# Patient Record
Sex: FEMALE | Race: WHITE | NOT HISPANIC OR LATINO | Employment: FULL TIME | URBAN - METROPOLITAN AREA
[De-identification: names, ages, dates, MRNs, and addresses within clinical notes are randomized per-mention and may not be internally consistent; named-entity substitution may affect disease eponyms.]

---

## 2017-03-27 ENCOUNTER — TRANSCRIBE ORDERS (OUTPATIENT)
Dept: MAMMOGRAPHY | Facility: HOSPITAL | Age: 47
End: 2017-03-27

## 2017-03-27 DIAGNOSIS — Z12.31 VISIT FOR SCREENING MAMMOGRAM: Primary | ICD-10-CM

## 2017-04-17 ENCOUNTER — HOSPITAL ENCOUNTER (OUTPATIENT)
Dept: MAMMOGRAPHY | Facility: HOSPITAL | Age: 47
Discharge: HOME | End: 2017-04-17
Attending: OBSTETRICS & GYNECOLOGY | Admitting: OBSTETRICS & GYNECOLOGY

## 2017-04-17 DIAGNOSIS — Z12.31 VISIT FOR SCREENING MAMMOGRAM: ICD-10-CM

## 2017-04-17 PROCEDURE — 77063 BREAST TOMOSYNTHESIS BI: CPT

## 2017-04-17 PROCEDURE — G0202 SCR MAMMO BI INCL CAD: HCPCS

## 2017-04-17 PROCEDURE — 77063 BREAST TOMOSYNTHESIS BI: CPT | Performed by: RADIOLOGY

## 2017-04-17 PROCEDURE — 77067 SCR MAMMO BI INCL CAD: CPT | Performed by: RADIOLOGY

## 2018-04-26 ENCOUNTER — TRANSCRIBE ORDERS (OUTPATIENT)
Dept: MAMMOGRAPHY | Facility: HOSPITAL | Age: 48
End: 2018-04-26

## 2018-04-26 DIAGNOSIS — Z12.31 VISIT FOR SCREENING MAMMOGRAM: Primary | ICD-10-CM

## 2018-05-22 ENCOUNTER — HOSPITAL ENCOUNTER (OUTPATIENT)
Dept: MAMMOGRAPHY | Facility: HOSPITAL | Age: 48
Discharge: HOME | End: 2018-05-22
Attending: OBSTETRICS & GYNECOLOGY | Admitting: OBSTETRICS & GYNECOLOGY

## 2018-05-22 DIAGNOSIS — Z12.31 VISIT FOR SCREENING MAMMOGRAM: ICD-10-CM

## 2018-05-22 PROCEDURE — 77067 SCR MAMMO BI INCL CAD: CPT | Performed by: RADIOLOGY

## 2018-05-22 PROCEDURE — 77063 BREAST TOMOSYNTHESIS BI: CPT

## 2018-05-22 PROCEDURE — 77067 SCR MAMMO BI INCL CAD: CPT

## 2018-05-22 PROCEDURE — 77063 BREAST TOMOSYNTHESIS BI: CPT | Performed by: RADIOLOGY

## 2019-05-10 ENCOUNTER — TRANSCRIBE ORDERS (OUTPATIENT)
Dept: MAMMOGRAPHY | Facility: HOSPITAL | Age: 49
End: 2019-05-10

## 2019-05-10 DIAGNOSIS — Z12.31 VISIT FOR SCREENING MAMMOGRAM: Primary | ICD-10-CM

## 2019-05-29 ENCOUNTER — HOSPITAL ENCOUNTER (OUTPATIENT)
Dept: MAMMOGRAPHY | Facility: HOSPITAL | Age: 49
Discharge: HOME | End: 2019-05-29
Admitting: OBSTETRICS & GYNECOLOGY

## 2019-05-29 DIAGNOSIS — Z12.31 VISIT FOR SCREENING MAMMOGRAM: ICD-10-CM

## 2019-05-29 PROCEDURE — 77063 BREAST TOMOSYNTHESIS BI: CPT | Performed by: RADIOLOGY

## 2019-05-29 PROCEDURE — 77063 BREAST TOMOSYNTHESIS BI: CPT

## 2019-05-29 PROCEDURE — 77067 SCR MAMMO BI INCL CAD: CPT | Performed by: RADIOLOGY

## 2019-05-29 PROCEDURE — 77067 SCR MAMMO BI INCL CAD: CPT

## 2020-05-27 ENCOUNTER — TRANSCRIBE ORDERS (OUTPATIENT)
Dept: MAMMOGRAPHY | Facility: HOSPITAL | Age: 50
End: 2020-05-27

## 2020-05-27 DIAGNOSIS — Z12.31 VISIT FOR SCREENING MAMMOGRAM: Primary | ICD-10-CM

## 2020-06-26 ENCOUNTER — TRANSCRIBE ORDERS (OUTPATIENT)
Dept: MAMMOGRAPHY | Facility: HOSPITAL | Age: 50
End: 2020-06-26

## 2020-06-26 DIAGNOSIS — Z12.31 VISIT FOR SCREENING MAMMOGRAM: Primary | ICD-10-CM

## 2020-07-13 ENCOUNTER — HOSPITAL ENCOUNTER (OUTPATIENT)
Dept: MAMMOGRAPHY | Facility: HOSPITAL | Age: 50
Discharge: HOME | End: 2020-07-13
Admitting: OBSTETRICS & GYNECOLOGY

## 2020-07-13 DIAGNOSIS — Z12.31 VISIT FOR SCREENING MAMMOGRAM: ICD-10-CM

## 2020-07-13 PROCEDURE — 77067 SCR MAMMO BI INCL CAD: CPT | Performed by: RADIOLOGY

## 2020-07-13 PROCEDURE — 77067 SCR MAMMO BI INCL CAD: CPT

## 2020-07-13 PROCEDURE — 77063 BREAST TOMOSYNTHESIS BI: CPT

## 2020-07-13 PROCEDURE — 77063 BREAST TOMOSYNTHESIS BI: CPT | Performed by: RADIOLOGY

## 2021-08-10 ENCOUNTER — APPOINTMENT (OUTPATIENT)
Dept: MAMMOGRAPHY | Facility: HOSPITAL | Age: 51
End: 2021-08-10

## 2021-08-17 ENCOUNTER — LAB (OUTPATIENT)
Dept: LAB | Facility: HOSPITAL | Age: 51
End: 2021-08-17

## 2021-08-17 ENCOUNTER — CONSULT (OUTPATIENT)
Dept: ONCOLOGY | Facility: CLINIC | Age: 51
End: 2021-08-17

## 2021-08-17 VITALS
BODY MASS INDEX: 39.49 KG/M2 | DIASTOLIC BLOOD PRESSURE: 70 MMHG | TEMPERATURE: 98.1 F | SYSTOLIC BLOOD PRESSURE: 139 MMHG | RESPIRATION RATE: 18 BRPM | WEIGHT: 237 LBS | HEIGHT: 65 IN | OXYGEN SATURATION: 96 % | HEART RATE: 74 BPM

## 2021-08-17 DIAGNOSIS — D72.829 LEUKOCYTOSIS, UNSPECIFIED TYPE: Primary | ICD-10-CM

## 2021-08-17 DIAGNOSIS — D72.829 LEUKOCYTOSIS, UNSPECIFIED TYPE: ICD-10-CM

## 2021-08-17 LAB
ALBUMIN SERPL-MCNC: 4.6 G/DL (ref 3.5–5.2)
ALBUMIN/GLOB SERPL: 1.3 G/DL
ALP SERPL-CCNC: 80 U/L (ref 39–117)
ALT SERPL W P-5'-P-CCNC: 14 U/L (ref 1–33)
ANION GAP SERPL CALCULATED.3IONS-SCNC: 13 MMOL/L (ref 5–15)
AST SERPL-CCNC: 18 U/L (ref 1–32)
BILIRUB SERPL-MCNC: 0.3 MG/DL (ref 0–1.2)
BUN SERPL-MCNC: 12 MG/DL (ref 6–20)
BUN/CREAT SERPL: 15.2 (ref 7–25)
CALCIUM SPEC-SCNC: 9.8 MG/DL (ref 8.6–10.5)
CHLORIDE SERPL-SCNC: 99 MMOL/L (ref 98–107)
CO2 SERPL-SCNC: 25 MMOL/L (ref 22–29)
CREAT SERPL-MCNC: 0.79 MG/DL (ref 0.57–1)
CRP SERPL-MCNC: 0.97 MG/DL (ref 0–0.5)
ERYTHROCYTE [DISTWIDTH] IN BLOOD BY AUTOMATED COUNT: 13.4 % (ref 12.3–15.4)
ERYTHROCYTE [SEDIMENTATION RATE] IN BLOOD: 33 MM/HR (ref 0–30)
GFR SERPL CREATININE-BSD FRML MDRD: 77 ML/MIN/1.73
GLOBULIN UR ELPH-MCNC: 3.5 GM/DL
GLUCOSE SERPL-MCNC: 115 MG/DL (ref 65–99)
HCT VFR BLD AUTO: 36.4 % (ref 34–46.6)
HGB BLD-MCNC: 11.9 G/DL (ref 12–15.9)
LDH SERPL-CCNC: 150 U/L (ref 135–214)
LYMPHOCYTES # BLD AUTO: 4 10*3/MM3 (ref 0.7–3.1)
LYMPHOCYTES NFR BLD AUTO: 33.5 % (ref 19.6–45.3)
MCH RBC QN AUTO: 30.1 PG (ref 26.6–33)
MCHC RBC AUTO-ENTMCNC: 32.8 G/DL (ref 31.5–35.7)
MCV RBC AUTO: 91.7 FL (ref 79–97)
MONOCYTES # BLD AUTO: 0.6 10*3/MM3 (ref 0.1–0.9)
MONOCYTES NFR BLD AUTO: 4.9 % (ref 5–12)
NEUTROPHILS NFR BLD AUTO: 61.6 % (ref 42.7–76)
NEUTROPHILS NFR BLD AUTO: 7.4 10*3/MM3 (ref 1.7–7)
PLATELET # BLD AUTO: 422 10*3/MM3 (ref 140–450)
PMV BLD AUTO: 6.6 FL (ref 6–12)
POTASSIUM SERPL-SCNC: 3.9 MMOL/L (ref 3.5–5.2)
PROT SERPL-MCNC: 8.1 G/DL (ref 6–8.5)
RBC # BLD AUTO: 3.96 10*6/MM3 (ref 3.77–5.28)
SODIUM SERPL-SCNC: 137 MMOL/L (ref 136–145)
WBC # BLD AUTO: 12 10*3/MM3 (ref 3.4–10.8)

## 2021-08-17 PROCEDURE — 99204 OFFICE O/P NEW MOD 45 MIN: CPT | Performed by: INTERNAL MEDICINE

## 2021-08-17 PROCEDURE — 85060 BLOOD SMEAR INTERPRETATION: CPT

## 2021-08-17 PROCEDURE — 83615 LACTATE (LD) (LDH) ENZYME: CPT

## 2021-08-17 PROCEDURE — 85025 COMPLETE CBC W/AUTO DIFF WBC: CPT

## 2021-08-17 PROCEDURE — 86140 C-REACTIVE PROTEIN: CPT

## 2021-08-17 PROCEDURE — 36415 COLL VENOUS BLD VENIPUNCTURE: CPT

## 2021-08-17 PROCEDURE — 80053 COMPREHEN METABOLIC PANEL: CPT

## 2021-08-17 PROCEDURE — 85652 RBC SED RATE AUTOMATED: CPT

## 2021-08-17 RX ORDER — VALACYCLOVIR HYDROCHLORIDE 1 G/1
TABLET, FILM COATED ORAL
COMMUNITY
Start: 2021-06-25 | End: 2022-09-23 | Stop reason: SDUPTHER

## 2021-08-17 RX ORDER — ATORVASTATIN CALCIUM 40 MG/1
TABLET, FILM COATED ORAL
COMMUNITY
Start: 2021-06-18 | End: 2021-08-24

## 2021-08-17 RX ORDER — METFORMIN HYDROCHLORIDE 500 MG/1
TABLET, EXTENDED RELEASE ORAL
COMMUNITY
Start: 2021-07-19 | End: 2022-04-15 | Stop reason: SDUPTHER

## 2021-08-17 RX ORDER — SPIRONOLACTONE 25 MG/1
TABLET ORAL
COMMUNITY
Start: 2021-06-17 | End: 2022-04-15 | Stop reason: SDUPTHER

## 2021-08-17 RX ORDER — EXENATIDE 2 MG/.85ML
INJECTION, SUSPENSION, EXTENDED RELEASE SUBCUTANEOUS
COMMUNITY
Start: 2021-07-20 | End: 2022-04-15 | Stop reason: SDUPTHER

## 2021-08-17 RX ORDER — LAMOTRIGINE 200 MG/1
TABLET ORAL
COMMUNITY
Start: 2021-06-18 | End: 2022-04-15 | Stop reason: SDUPTHER

## 2021-08-17 NOTE — PROGRESS NOTES
New Patient Office Visit      Date: 2021     Patient Name: Emily Heller  MRN: [id number]  : 1970  Referring Physician: Rubens Bass    Chief Complaint: Establish care for leukocytosis    History of Present Illness: Emily Heller is a pleasant 51 y.o. female the past medical history of hyperlipidemia, type 2 diabetes, hypertension, anxiety who presents today for evaluation of [place] ptosis. The patient was recently seen by their PCP who checked a CBC which was notable for an elevated WBC to 12.8 with an ANC of 7600 in 2021.  Previously it had been around 10.8 in 2021.  She is a former smoker but quit in .  Denies any recent or recurrent infections.  Denies any unexplained weight loss, fevers, chills, night sweats.  Does note a history of an uncle with a history of leukemia but no other hematologic malignancies.  She denies any recent steroid use as well.  Otherwise she feels well and has no major complaints today.    Oncology History:    Oncology/Hematology History    No history exists.       Subjective      Review of Systems:     Constitutional: Negative for fevers, chills, or weight loss  Eyes: Negative for blurred vision or discharge         Ear/Nose/Throat: Negative for difficulty swallowing, sore throat, LAD                                                       Respiratory: Negative for cough, SOA, wheezing                                                                                        Cardiovascular: Negative for chest pain or palpitations                                                                  Gastrointestinal: Negative for nausea, vomiting or diarrhea                                                                     Genitourinary: Negative for dysuria or hematuria                                                                                           Musculoskeletal: Negative for any joint pains or muscle aches                          "                                               Neurologic: Negative for any weakness, headaches, dizziness                                                                         Hematologic: Negative for any easy bleeding or bruising                                                                                   Psychiatric: Negative for anxiety or depression                             Past Medical History: History reviewed. No pertinent past medical history.    Past Surgical History:   Past Surgical History:   Procedure Laterality Date   • REDUCTION MAMMAPLASTY  2003       Family History:   Family History   Problem Relation Age of Onset   • Breast cancer Paternal Aunt         age unknown   • Ovarian cancer Neg Hx        Social History:   Social History     Socioeconomic History   • Marital status:      Spouse name: Not on file   • Number of children: Not on file   • Years of education: Not on file   • Highest education level: Not on file   Tobacco Use   • Smoking status: Unknown If Ever Smoked       Medications:     Current Outpatient Medications:   •  atorvastatin (LIPITOR) 40 MG tablet, , Disp: , Rfl:   •  Bydureon BCise 2 MG/0.85ML auto-injector injection, , Disp: , Rfl:   •  lamoTRIgine (LaMICtal) 200 MG tablet, , Disp: , Rfl:   •  metFORMIN ER (GLUCOPHAGE-XR) 500 MG 24 hr tablet, , Disp: , Rfl:   •  spironolactone (ALDACTONE) 25 MG tablet, , Disp: , Rfl:   •  valACYclovir (VALTREX) 1000 MG tablet, , Disp: , Rfl:     Allergies:   No Known Allergies    Objective     Physical Exam:  Vital Signs:   Vitals:    08/17/21 1403   BP: 139/70   Pulse: 74   Resp: 18   Temp: 98.1 °F (36.7 °C)   SpO2: 96%   Weight: 108 kg (237 lb)   Height: 165.1 cm (65\")   PainSc: 0-No pain     Pain Score    08/17/21 1403   PainSc: 0-No pain     ECOG Performance Status: 0 - Asymptomatic    Constitutional: NAD, ECOG 0  Eyes: PERRLA, scleral anicteric  ENT: No LAD, no thyromegaly  Respiratory: CTAB, no wheezing, rales, " rhonchi  Cardiovascular: RRR, no murmurs, pulses 2+ bilaterally  Abdomen: soft, NT/ND, no HSM  Musculoskeletal: strength 5/5 bilaterally, no c/c/e  Neurologic: A&O x 3, CN II-XII intact grossly  Psych: mood and affect congruent, no SI or HI    Results Review:   No visits with results within 2 Week(s) from this visit.   Latest known visit with results is:   No results found for any previous visit.       No results found.    Assessment / Plan      Assessment/Plan:   1. Leukocytosis, unspecified type (Primary)  -Unclear etiology at this time.  Likely secondary to increased inflammation from metabolic syndrome  -Per review of outside records, most recent WBC 12.8 with an ANC of 7.6 in June 2021.  This is slightly elevated from comparison in April 2021  -Outside records notable for normal vitamin D, vitamin B12, ferritin, folic acid, iron level, TSH in April 2021  -We will check labs as below to rule out other secondary causes  -No indication for bone marrow biopsy at this time  -     Lactate Dehydrogenase; Future  -     Peripheral Blood Smear; Future  -     Flow Cytometry, Blood; Future  -     CBC & Differential; Future  -     Comprehensive Metabolic Panel; Future  -     C-reactive Protein; Future  -     Sedimentation Rate; Future      Follow Up:   Follow-up in 2-3 weeks     Reji Herndon MD  Hematology and Oncology     Please note that portions of this note may have been completed with a voice recognition program. Efforts were made to edit the dictations, but occasionally words are mistranscribed.

## 2021-08-18 LAB
CYTOLOGIST CVX/VAG CYTO: NORMAL
PATH INTERP BLD-IMP: NORMAL

## 2021-08-19 LAB — REF LAB TEST METHOD: NORMAL

## 2021-08-24 ENCOUNTER — OFFICE VISIT (OUTPATIENT)
Dept: OBSTETRICS AND GYNECOLOGY | Facility: CLINIC | Age: 51
End: 2021-08-24

## 2021-08-24 ENCOUNTER — HOSPITAL ENCOUNTER (OUTPATIENT)
Dept: MAMMOGRAPHY | Facility: HOSPITAL | Age: 51
Discharge: HOME | End: 2021-08-24

## 2021-08-24 ENCOUNTER — TELEPHONE (OUTPATIENT)
Dept: OBSTETRICS AND GYNECOLOGY | Facility: CLINIC | Age: 51
End: 2021-08-24

## 2021-08-24 VITALS
BODY MASS INDEX: 39.82 KG/M2 | HEIGHT: 65 IN | SYSTOLIC BLOOD PRESSURE: 124 MMHG | DIASTOLIC BLOOD PRESSURE: 70 MMHG | WEIGHT: 239 LBS

## 2021-08-24 DIAGNOSIS — Z12.31 BREAST CANCER SCREENING BY MAMMOGRAM: ICD-10-CM

## 2021-08-24 DIAGNOSIS — N84.1 CERVICAL POLYP: ICD-10-CM

## 2021-08-24 DIAGNOSIS — Z12.11 SCREEN FOR COLON CANCER: ICD-10-CM

## 2021-08-24 DIAGNOSIS — Z12.31 VISIT FOR SCREENING MAMMOGRAM: ICD-10-CM

## 2021-08-24 DIAGNOSIS — N92.6 IRREGULAR PERIODS: ICD-10-CM

## 2021-08-24 DIAGNOSIS — Z01.419 WOMEN'S ANNUAL ROUTINE GYNECOLOGICAL EXAMINATION: Primary | ICD-10-CM

## 2021-08-24 DIAGNOSIS — N92.1 MENORRHAGIA WITH IRREGULAR CYCLE: ICD-10-CM

## 2021-08-24 PROCEDURE — 99396 PREV VISIT EST AGE 40-64: CPT | Performed by: NURSE PRACTITIONER

## 2021-08-24 NOTE — PROGRESS NOTES
GYN Annual Exam     CC - Here for annual exam.        HPI  Emily Heller is a 51 y.o. female, , who presents for annual well woman exam.  She reports her periods are every 3 wks-6 months and heavy x 3 days.  There were no changes to her medical or surgical history since her last visit.. Partner Status: Marital Status: .  New Partners since last visit: no.      She had a LEEP in her 20s and she is worried about her Pap.    Additional OB/GYN History   Current contraception: contraceptive methods: Post menopausal status  Desires to: do not start contraception  On HRT? No  Last Pap : - negative  Last Completed Pap Smear          Ordered - PAP SMEAR (Every 3 Years) Ordered on 2020  Done - negative              History of abnormal Pap smear: yes  Family history of uterine, colon, breast, or ovarian cancer: yes - paternal aunt with breast cancer  Performs monthly Self-Breast Exam: no  Last mammogram:   Last Completed Mammogram          Ordered - MAMMOGRAM (Every 2 Years) Ordered on 2020  Mammo Screening Digital Tomosynthesis Bilateral With CAD    2019  Mammo Screening Digital Tomosynthesis Bilateral With CAD    2018  Mammo screening digital tomosynthesis bilateral w CAD    2017  Mammo Screening Digital Tomosynthesis Bilateral With CAD    02/15/2016  Mammo screening bilateral w CAD    Only the first 5 history entries have been loaded, but more history exists.              Last colonoscopy:  never    Last DEXA: None  Exercises Regularly: no  Feelings of Anxiety or Depression: no      Tobacco Usage?: No   OB History        3    Para   3    Term   3            AB        Living           SAB        TAB        Ectopic        Molar        Multiple        Live Births                    Health Maintenance   Topic Date Due   • Annual Gynecologic Pelvic and Breast Exam  Never done   • URINE MICROALBUMIN  Never done   • COLORECTAL  "CANCER SCREENING  Never done   • ANNUAL PHYSICAL  Never done   • Pneumococcal Vaccine 0-64 (1 of 2 - PPSV23) Never done   • COVID-19 Vaccine (1) Never done   • ZOSTER VACCINE (1 of 2) Never done   • HEPATITIS C SCREENING  Never done   • DIABETIC FOOT EXAM  Never done   • DIABETIC EYE EXAM  Never done   • INFLUENZA VACCINE  10/01/2021   • HEMOGLOBIN A1C  10/30/2021   • MAMMOGRAM  07/13/2022   • PAP SMEAR  07/13/2023   • TDAP/TD VACCINES (2 - Td or Tdap) 05/05/2024       The additional following portions of the patient's history were reviewed and updated as appropriate: allergies, current medications, past family history, past medical history, past social history, past surgical history and problem list.    Review of Systems   Constitutional: Negative.    HENT: Negative.    Eyes: Negative.    Respiratory: Negative.    Cardiovascular: Negative.    Gastrointestinal: Negative.    Endocrine: Negative.    Genitourinary: Positive for menstrual problem and vaginal bleeding.   Musculoskeletal: Negative.    Skin: Negative.    Allergic/Immunologic: Negative.    Neurological: Negative.    Hematological: Negative.    Psychiatric/Behavioral: Negative.        I have reviewed and agree with the HPI, ROS, and historical information as entered above. Mckenna Saleh, APRN    Objective   /70   Ht 165.1 cm (65\")   Wt 108 kg (239 lb)   LMP 03/27/2017 Comment: denies pregnant  BMI 39.77 kg/m²     Physical Exam  Vitals and nursing note reviewed. Exam conducted with a chaperone present.   Constitutional:       General: She is not in acute distress.     Appearance: Normal appearance. She is well-developed. She is obese. She is not ill-appearing.   HENT:      Head: Normocephalic and atraumatic.   Neck:      Thyroid: No thyroid mass or thyromegaly.   Pulmonary:      Effort: Pulmonary effort is normal. No retractions.   Chest:      Chest wall: No mass.      Breasts:         Right: Normal. No mass, nipple discharge, skin change or " tenderness.         Left: Normal. No mass, nipple discharge, skin change or tenderness.   Abdominal:      Palpations: Abdomen is soft. Abdomen is not rigid. There is no mass.      Tenderness: There is no abdominal tenderness. There is no guarding.      Hernia: No hernia is present. There is no hernia in the left inguinal area.   Genitourinary:     General: Normal vulva.      Labia:         Right: No rash, tenderness or lesion.         Left: No rash, tenderness or lesion.       Vagina: Normal. No vaginal discharge or lesions.      Cervix: No cervical motion tenderness, discharge, lesion or cervical bleeding.      Uterus: Normal. Not enlarged, not fixed and not tender.       Adnexa: Right adnexa normal and left adnexa normal.        Right: No mass or tenderness.          Left: No mass or tenderness.        Rectum: No external hemorrhoid.            Comments: 11 oclock ?Nabothian cyst  5 oclock ?polyp on thick stalk  Musculoskeletal:      Cervical back: Normal range of motion. No muscular tenderness.   Skin:     General: Skin is warm and dry.   Neurological:      Mental Status: She is alert and oriented to person, place, and time.   Psychiatric:         Mood and Affect: Mood normal.         Behavior: Behavior normal.            Assessment and Plan    Problem List Items Addressed This Visit     None      Visit Diagnoses     Women's annual routine gynecological examination    -  Primary    Relevant Orders    Pap IG, HPV-hr    Menorrhagia with irregular cycle        Relevant Orders    US Non-ob Transvaginal    T4, Free    TSH    Follicle Stimulating Hormone    Estradiol    Breast cancer screening by mammogram        Relevant Orders    Mammo Screening Digital Tomosynthesis Bilateral With CAD    Screen for colon cancer        Relevant Orders    Ambulatory Referral For Screening Colonoscopy    Cervical polyp        Irregular periods              1. GYN annual well woman exam.   2. Reviewed monthly self breast exams.   Instructed to call with lumps, pain, or breast discharge.  Yearly mammograms ordered.  3. Ordered mammogram today.  4. Recommended use of Vitamin D and getting adequate calcium in her diet. (1500mg)  5. Reviewed exercise as a preventative health measures.   6. Reviewed BMI and weight loss as preventative health measures.   7. Colonoscopy recommended.  8. Symptoms of menopausal transition reviewed with patient.   9. Reccommended Flu Vaccine in Fall of each year.  10. Instructed on [place] exercises and gave patient educational information.  11. RTC in 1 year or PRN with problems.   12. D/w pt RTO for US and FU with RWO.    Mckenna Saleh, APRN  08/24/2021

## 2021-08-24 NOTE — TELEPHONE ENCOUNTER
S/w pt she states she was seen today by Mckenna Saleh, and then had her mammogram appt afterwards. When she got to the mammogram appt they asked her questions and she stated to them she has left breast pain. They cancelled patients appt and told her we have to put in a diagnostic mammogram order for this. I did not see in [facility] note where left breast pain was addressed so does she need to come back in the office here to be evaluated for this or can we just send in the order ?    Please advise, and I will let patient know. Thanks.

## 2021-08-25 LAB
ESTRADIOL SERPL-MCNC: 24 PG/ML
FSH SERPL-ACNC: 12 MIU/ML
T4 FREE SERPL-MCNC: 1.29 NG/DL (ref 0.93–1.7)
TSH SERPL DL<=0.005 MIU/L-ACNC: 1.22 UIU/ML (ref 0.27–4.2)

## 2021-09-02 DIAGNOSIS — Z01.419 WOMEN'S ANNUAL ROUTINE GYNECOLOGICAL EXAMINATION: ICD-10-CM

## 2021-09-28 ENCOUNTER — APPOINTMENT (OUTPATIENT)
Dept: MAMMOGRAPHY | Facility: HOSPITAL | Age: 51
End: 2021-09-28

## 2021-12-03 ENCOUNTER — TELEPHONE (OUTPATIENT)
Dept: OBSTETRICS AND GYNECOLOGY | Facility: CLINIC | Age: 51
End: 2021-12-03

## 2021-12-03 NOTE — TELEPHONE ENCOUNTER
Patient states she is having itching on the top of her vaginal area. Itching has been present for at least a week.

## 2021-12-08 ENCOUNTER — OFFICE VISIT (OUTPATIENT)
Dept: OBSTETRICS AND GYNECOLOGY | Facility: CLINIC | Age: 51
End: 2021-12-08

## 2021-12-08 VITALS
HEIGHT: 65 IN | BODY MASS INDEX: 40.45 KG/M2 | WEIGHT: 242.8 LBS | DIASTOLIC BLOOD PRESSURE: 70 MMHG | SYSTOLIC BLOOD PRESSURE: 126 MMHG

## 2021-12-08 DIAGNOSIS — N89.8 VAGINAL ITCHING: Primary | ICD-10-CM

## 2021-12-08 DIAGNOSIS — N90.89 VULVAR LESION: ICD-10-CM

## 2021-12-08 PROCEDURE — 99213 OFFICE O/P EST LOW 20 MIN: CPT | Performed by: NURSE PRACTITIONER

## 2021-12-08 RX ORDER — LISINOPRIL AND HYDROCHLOROTHIAZIDE 12.5; 1 MG/1; MG/1
TABLET ORAL
COMMUNITY
Start: 2021-11-05 | End: 2022-04-15 | Stop reason: SDUPTHER

## 2021-12-08 RX ORDER — NYSTATIN AND TRIAMCINOLONE ACETONIDE 100000; 1 [USP'U]/G; MG/G
1 OINTMENT TOPICAL 2 TIMES DAILY
Qty: 30 G | Refills: 0 | Status: SHIPPED | OUTPATIENT
Start: 2021-12-08 | End: 2021-12-15

## 2021-12-08 NOTE — PROGRESS NOTES
Chief Complaint   Patient presents with   • Follow-up       Subjective   HPI  Emily Heller is a 51 y.o. female, , who presents for Vaginal Itching.      She states she has experienced this problem for 2 weeks.  Patient stated that her vagina is very irritated. Patient stated that itchiness is right above her clitoris. Patient stated that she has to wear a pad because of bladder leakage. Patient denies any odor or discharge. Patient stated that there is a little blood at the top of her vagina. Patient stated that she feels irritation when she is sitting or her pad rubs against her clitoris.     Her last LMP was Patient's last menstrual period was 2021..  Periods are irregular, she has never gone a year without menses and recent labs showed she is not post-menopausal. Patient is Perimenopausal. Partner Status: Marital Status: .  New Partners since last visit: no.  Desires STD Screening: no.    Additional OB/GYN History   Current contraception: contraceptive methods: None  Desires to: do not start contraception  Last Pap :   Last Completed Pap Smear          Ordered - PAP SMEAR (Every 3 Years) Ordered on 2021  SCANNED - PAP SMEAR    2020  Done - negative              History of abnormal Pap smear: no  Last mammogram:   Last Completed Mammogram          Ordered - MAMMOGRAM (Every 2 Years) Ordered on 2020  Mammo Screening Digital Tomosynthesis Bilateral With CAD    2019  Mammo Screening Digital Tomosynthesis Bilateral With CAD    2018  Mammo screening digital tomosynthesis bilateral w CAD    2017  Mammo Screening Digital Tomosynthesis Bilateral With CAD    02/15/2016  Mammo screening bilateral w CAD    Only the first 5 history entries have been loaded, but more history exists.              Tobacco Usage?: No   OB History        3    Para   3    Term   3            AB        Living           SAB        IAB         "Ectopic        Molar        Multiple        Live Births                    Health Maintenance   Topic Date Due   • URINE MICROALBUMIN  Never done   • COLORECTAL CANCER SCREENING  Never done   • ANNUAL PHYSICAL  Never done   • Pneumococcal Vaccine 0-64 (1 of 2 - PPSV23) Never done   • COVID-19 Vaccine (1) Never done   • ZOSTER VACCINE (1 of 2) Never done   • INFLUENZA VACCINE  Never done   • HEPATITIS C SCREENING  Never done   • DIABETIC FOOT EXAM  Never done   • DIABETIC EYE EXAM  Never done   • HEMOGLOBIN A1C  10/30/2021   • MAMMOGRAM  07/13/2022   • Annual Gynecologic Pelvic and Breast Exam  08/25/2022   • TDAP/TD VACCINES (2 - Td or Tdap) 05/05/2024   • PAP SMEAR  08/24/2024       The additional following portions of the patient's history were reviewed and updated as appropriate: allergies and current medications.    Review of Systems   Constitutional: Negative.    Genitourinary: Negative for dysuria and vaginal discharge.        Vaginal itching and irritation       I have reviewed and agree with the HPI, ROS, and historical information as entered above. Eladia Santana, APRN    Objective   /70   Ht 165.1 cm (65\")   Wt 110 kg (242 lb 12.8 oz)   LMP 09/01/2021 Comment: denies pregnant  Breastfeeding No   BMI 40.40 kg/m²     Physical Exam  Vitals and nursing note reviewed. Exam conducted with a chaperone present.   Constitutional:       Appearance: Normal appearance.   Pulmonary:      Effort: Pulmonary effort is normal.   Abdominal:      Palpations: Abdomen is soft.   Genitourinary:     Labia:         Right: No rash, tenderness or lesion.         Left: No rash, tenderness or lesion.       Vagina: No lesions.      Cervix: Cervical bleeding present. No cervical motion tenderness, discharge or lesion.      Uterus: Normal. Not enlarged, not fixed and not tender.       Adnexa:         Right: No mass or tenderness.          Left: No mass or tenderness.        Rectum: No external hemorrhoid.          " Comments: Erythematous lesions. Possible consistent with scratching or friction. Chaperone Present  Neurological:      Mental Status: She is alert.         Assessment/Plan     Assessment     Problem List Items Addressed This Visit     None      Visit Diagnoses     Vaginal itching    -  Primary    Relevant Medications    nystatin-triamcinolone (MYCOLOG) 637453-1.1 UNIT/GM-% ointment    Other Relevant Orders    HSV 1/2, PCR (Non-CSF) - , Vulva    Candida panel, PCR - Swab, Vagina    Bacterial Vaginosis, SHAZIA - Swab, Cervix    Vulvar lesion        Relevant Orders    HSV 1/2, PCR (Non-CSF) - , Vulva          1. Low suspicion for HSV but cultures sent to rule out. WP/KOH negative. BV/yeast cultures sent. Will try triamcinolone/nystatin ointment. We discussed kegel exercises and weight loss for stress incontinence. She declines referral at this time.       Plan     Return for Annual physical.  2. Will call if cultures are positive. Triamcinolone/nystatin to area. If cultures neg but problem persists she will call back.       Eladia Santana, APRN  12/08/2021

## 2021-12-13 LAB
A VAGINAE DNA VAG QL NAA+PROBE: NORMAL SCORE
BVAB2 DNA VAG QL NAA+PROBE: NORMAL SCORE
C ALBICANS DNA VAG QL NAA+PROBE: NEGATIVE
C GLABRATA DNA VAG QL NAA+PROBE: NEGATIVE
C KRUSEI DNA VAG QL NAA+PROBE: NEGATIVE
C LUSITANIAE DNA VAG QL NAA+PROBE: NEGATIVE
CANDIDA DNA VAG QL NAA+PROBE: NEGATIVE
HSV1 DNA SPEC QL NAA+PROBE: NEGATIVE
HSV2 DNA SPEC QL NAA+PROBE: NEGATIVE
MEGA1 DNA VAG QL NAA+PROBE: NORMAL SCORE

## 2022-03-16 ENCOUNTER — OFFICE VISIT (OUTPATIENT)
Dept: OBSTETRICS AND GYNECOLOGY | Facility: CLINIC | Age: 52
End: 2022-03-16

## 2022-03-16 VITALS — SYSTOLIC BLOOD PRESSURE: 108 MMHG | WEIGHT: 242.2 LBS | BODY MASS INDEX: 40.3 KG/M2 | DIASTOLIC BLOOD PRESSURE: 72 MMHG

## 2022-03-16 DIAGNOSIS — N90.7 SEBACEOUS CYST OF LABIA: Primary | ICD-10-CM

## 2022-03-16 PROCEDURE — 99212 OFFICE O/P EST SF 10 MIN: CPT | Performed by: NURSE PRACTITIONER

## 2022-03-16 NOTE — PROGRESS NOTES
CC-Vulvar bump    Subjective   HPI  Emily Heller is a 51 y.o. female, , who presents for a vaginal exam.    Patient reports that approximately two weeks ago, while in the shower she felt a hard bump in her vaginal area, near her right labia.  She denies pain, tenderness, or discharge.  She says that she tried scratching it, nothing happened and no pain occurred.       Her last LMP was Patient's last menstrual period was 03/15/2022 (exact date)..  Periods are irregular, lasting 10 days.  Dysmenorrhea:none.  Patient reports problems with: none.  Partner Status: Marital Status: .  New Partners since last visit: no.  Desires STD Screening: no.    Additional OB/GYN History   Current contraception: contraceptive methods: Vasectomy   Desires to: do not start contraception  Last Pap : 2021  Last Completed Pap Smear          Ordered - PAP SMEAR (Every 3 Years) Ordered on 2021  SCANNED - PAP SMEAR    2020  Done - negative              History of abnormal Pap smear: yes - h/o  Last mammogram:   Last Completed Mammogram          Ordered - MAMMOGRAM (Every 2 Years) Ordered on 2020  Mammo Screening Digital Tomosynthesis Bilateral With CAD    2019  Mammo Screening Digital Tomosynthesis Bilateral With CAD    2018  Mammo screening digital tomosynthesis bilateral w CAD    2017  Mammo Screening Digital Tomosynthesis Bilateral With CAD    02/15/2016  Mammo screening bilateral w CAD    Only the first 5 history entries have been loaded, but more history exists.              Tobacco Usage?: No   OB History        3    Para   3    Term   3            AB        Living           SAB        IAB        Ectopic        Molar        Multiple        Live Births                    Health Maintenance   Topic Date Due   • URINE MICROALBUMIN  Never done   • COLORECTAL CANCER SCREENING  Never done   • ANNUAL PHYSICAL  Never done   • COVID-19 Vaccine  (1) Never done   • Pneumococcal Vaccine 0-64 (1 of 2 - PPSV23) Never done   • ZOSTER VACCINE (1 of 2) Never done   • INFLUENZA VACCINE  Never done   • HEPATITIS C SCREENING  Never done   • DIABETIC FOOT EXAM  Never done   • DIABETIC EYE EXAM  Never done   • HEMOGLOBIN A1C  10/30/2021   • MAMMOGRAM  07/13/2022   • Annual Gynecologic Pelvic and Breast Exam  08/25/2022   • TDAP/TD VACCINES (2 - Td or Tdap) 05/05/2024   • PAP SMEAR  08/24/2024       The additional following portions of the patient's history were reviewed and updated as appropriate: allergies, current medications, past family history, past medical history, past social history, past surgical history and problem list.    Review of Systems   Constitutional: Negative.    Respiratory: Negative.    Cardiovascular: Negative.    Gastrointestinal: Negative.    Genitourinary: Negative for vaginal pain.        Vulvar lump       I have reviewed and agree with the HPI, ROS, and historical information as entered above. Eladia Santana, APRN    Objective   /72   Wt 110 kg (242 lb 3.2 oz)   LMP 03/15/2022 (Exact Date)   Breastfeeding No   BMI 40.30 kg/m²     Physical Exam  Constitutional:       Appearance: Normal appearance.   Pulmonary:      Effort: Pulmonary effort is normal.   Abdominal:      Palpations: Abdomen is soft.   Genitourinary:     General: Normal vulva.      Comments: approx 2.5mm sebaceous cyst of right labia majora, nontender  Neurological:      Mental Status: She is alert.         Assessment/Plan     Assessment     Problem List Items Addressed This Visit    None     Visit Diagnoses     Sebaceous cyst of labia    -  Primary          1. 2.5mm sebaceous cyst of right labia majora    Plan     Return for Annual physical.   No intervention necessary since not bothersome. Reassured this is a benign finding.         Eladia Santana, APRN  03/16/2022

## 2022-03-21 ENCOUNTER — PATIENT MESSAGE (OUTPATIENT)
Dept: FAMILY MEDICINE CLINIC | Facility: CLINIC | Age: 52
End: 2022-03-21

## 2022-04-06 ENCOUNTER — TELEPHONE (OUTPATIENT)
Dept: FAMILY MEDICINE CLINIC | Facility: CLINIC | Age: 52
End: 2022-04-06

## 2022-04-06 DIAGNOSIS — E56.9 VITAMIN DEFICIENCY: ICD-10-CM

## 2022-04-06 DIAGNOSIS — E11.9 CONTROLLED TYPE 2 DIABETES MELLITUS WITHOUT COMPLICATION, WITHOUT LONG-TERM CURRENT USE OF INSULIN: ICD-10-CM

## 2022-04-06 DIAGNOSIS — I10 BENIGN ESSENTIAL HTN: Primary | ICD-10-CM

## 2022-04-06 NOTE — TELEPHONE ENCOUNTER
Patient would like to get lab orders put in so that she can go by the lab at [facility] ([place]) tomorrow and have those drawn before her scheduled appointment next Friday. The patient states that he placed these orders at her last visit in feb but possibly didn't make it into Epic.    Please advise and if there is any questions or concerns the patient can be reached at [phone].

## 2022-04-15 ENCOUNTER — TELEPHONE (OUTPATIENT)
Dept: FAMILY MEDICINE CLINIC | Facility: CLINIC | Age: 52
End: 2022-04-15

## 2022-04-15 ENCOUNTER — OFFICE VISIT (OUTPATIENT)
Dept: FAMILY MEDICINE CLINIC | Facility: CLINIC | Age: 52
End: 2022-04-15

## 2022-04-15 VITALS
SYSTOLIC BLOOD PRESSURE: 126 MMHG | HEART RATE: 67 BPM | HEIGHT: 65 IN | BODY MASS INDEX: 40.45 KG/M2 | OXYGEN SATURATION: 98 % | WEIGHT: 242.8 LBS | DIASTOLIC BLOOD PRESSURE: 84 MMHG

## 2022-04-15 DIAGNOSIS — E56.9 VITAMIN DEFICIENCY: ICD-10-CM

## 2022-04-15 DIAGNOSIS — D72.829 LEUKOCYTOSIS, UNSPECIFIED TYPE: ICD-10-CM

## 2022-04-15 DIAGNOSIS — Z12.39 SCREENING BREAST EXAMINATION: ICD-10-CM

## 2022-04-15 DIAGNOSIS — E11.9 TYPE 2 DIABETES MELLITUS WITHOUT COMPLICATION, WITHOUT LONG-TERM CURRENT USE OF INSULIN: ICD-10-CM

## 2022-04-15 DIAGNOSIS — E78.2 MIXED HYPERLIPIDEMIA: ICD-10-CM

## 2022-04-15 DIAGNOSIS — I10 BENIGN ESSENTIAL HTN: Primary | ICD-10-CM

## 2022-04-15 DIAGNOSIS — Z12.11 SCREENING FOR COLON CANCER: ICD-10-CM

## 2022-04-15 DIAGNOSIS — R45.4 ANGER: ICD-10-CM

## 2022-04-15 DIAGNOSIS — F32.9 MAJOR DEPRESSIVE DISORDER WITH CURRENT ACTIVE EPISODE, UNSPECIFIED DEPRESSION EPISODE SEVERITY, UNSPECIFIED WHETHER RECURRENT: ICD-10-CM

## 2022-04-15 DIAGNOSIS — Z86.19 HISTORY OF COLD SORES: ICD-10-CM

## 2022-04-15 PROBLEM — E66.01 MORBID OBESITY (HCC): Status: ACTIVE | Noted: 2022-04-15

## 2022-04-15 LAB
BILIRUB BLD-MCNC: NEGATIVE MG/DL
CLARITY, POC: CLEAR
COLOR UR: YELLOW
EXPIRATION DATE: ABNORMAL
GLUCOSE UR STRIP-MCNC: ABNORMAL MG/DL
KETONES UR QL: NEGATIVE
LEUKOCYTE EST, POC: NEGATIVE
Lab: ABNORMAL
NITRITE UR-MCNC: NEGATIVE MG/ML
PH UR: 0.3 [PH] (ref 5–8)
POC MICROALBUMIN URINE: 20
PROT UR STRIP-MCNC: NEGATIVE MG/DL
RBC # UR STRIP: NEGATIVE /UL
SP GR UR: 1 (ref 1–1.03)
UROBILINOGEN UR QL: NORMAL

## 2022-04-15 PROCEDURE — 82044 UR ALBUMIN SEMIQUANTITATIVE: CPT | Performed by: NURSE PRACTITIONER

## 2022-04-15 PROCEDURE — 81003 URINALYSIS AUTO W/O SCOPE: CPT | Performed by: NURSE PRACTITIONER

## 2022-04-15 PROCEDURE — 99214 OFFICE O/P EST MOD 30 MIN: CPT | Performed by: NURSE PRACTITIONER

## 2022-04-15 RX ORDER — LAMOTRIGINE 200 MG/1
200 TABLET ORAL DAILY
Qty: 90 TABLET | Refills: 1 | Status: SHIPPED | OUTPATIENT
Start: 2022-04-15 | End: 2022-09-23 | Stop reason: SDUPTHER

## 2022-04-15 RX ORDER — SPIRONOLACTONE 25 MG/1
25 TABLET ORAL DAILY
Qty: 90 TABLET | Refills: 1 | Status: SHIPPED | OUTPATIENT
Start: 2022-04-15 | End: 2022-09-23 | Stop reason: SDUPTHER

## 2022-04-15 RX ORDER — EXENATIDE 2 MG/.85ML
2 INJECTION, SUSPENSION, EXTENDED RELEASE SUBCUTANEOUS WEEKLY
Qty: 2 PEN | Refills: 2 | Status: SHIPPED | OUTPATIENT
Start: 2022-04-15 | End: 2022-04-20

## 2022-04-15 RX ORDER — METFORMIN HYDROCHLORIDE 500 MG/1
1000 TABLET, EXTENDED RELEASE ORAL
Qty: 180 TABLET | Refills: 1 | Status: SHIPPED | OUTPATIENT
Start: 2022-04-15 | End: 2022-04-20 | Stop reason: SDUPTHER

## 2022-04-15 RX ORDER — LISINOPRIL AND HYDROCHLOROTHIAZIDE 12.5; 1 MG/1; MG/1
1 TABLET ORAL DAILY
Qty: 90 TABLET | Refills: 1 | Status: SHIPPED | OUTPATIENT
Start: 2022-04-15 | End: 2022-09-23 | Stop reason: SDUPTHER

## 2022-04-15 NOTE — ASSESSMENT & PLAN NOTE
Meds refilled.  Labs drawn.  She will return for fasting lipid check.  Goal blood pressure less than 140/90.  Let me know if gets 2 or above that.  Proper diet and exercise plan discussed and encouraged.  Risk of meds discussed and understood.

## 2022-04-15 NOTE — TELEPHONE ENCOUNTER
Will you run a PA on her Bydureon.  We tried Ozempic but it caused her to have blurry vision so we are wanting to get her back on Bydureon instead.  Thanks

## 2022-04-15 NOTE — ASSESSMENT & PLAN NOTE
Will recheck today.  She states she has seen hematology in the past and they told her it is likely due to inflammatory reaction.  She has been discharged from them and they state just to monitor and to return if it increases.

## 2022-04-15 NOTE — ASSESSMENT & PLAN NOTE
Meds refilled.  Labs drawn.  She wants to try Bydureon again.  I will prescribe that and we will see if we can get a PA run on it.  Annual eye exam encouraged.  Check feet daily for sores.

## 2022-04-15 NOTE — ASSESSMENT & PLAN NOTE
Patient know she is due both mammogram and colonoscopy.  She denied again me scheduling this today.  States she will let me know when she wants to do it.  Education provided.

## 2022-04-15 NOTE — ASSESSMENT & PLAN NOTE
We discussed starting a statin as she is a diabetic but she denied.  Risk discussed and understood.  Proper diet exercise plan discussed and encouraged.

## 2022-04-15 NOTE — PROGRESS NOTES
"Chief Complaint  Diabetes    Subjective          Emily Heller presents to [facility]  Patient presents for med refills.    Hypertension well-controlled on lisinopril HCTZ and spironolactone.  Goal blood pressure is below 140/90 and it does stay below that.  Tries to watch her diet but does admit she could do better.  Walks daily.  Denies starting a statin.    History of diabetes and states doing well on metformin.  She used to be on Bydureon but insurance stopped paying for that.  We then tried Ozempic but she states it caused her to have blurry vision so she stopped that.  Has been off of it for over a month.  States she has had an eye exam within the past month.  States since stopping the Ozempic her vision is back to normal.  She would like to try the Bydureon again.    History of depression but states well controlled.  Also states history of anger issues which is why she is on the mood stabilizer Lamictal.  Doing well on it with no issues or concerns.  PHQ-9 completed and discussed.  No thoughts of suicide or hurting herself or anyone else.  Asking for refill.  States she has been on it for years.    Due mammogram and colonoscopy but denies at this time.    Overall she is doing well with no issues or concerns today.  No dizziness no headache no chest pain no chest pressure no shortness of breath no trouble breathing.  No urinary or bowel issues.      Objective   Vital Signs:   /84   Pulse 67   Ht 165.1 cm (65\")   Wt 110 kg (242 lb 12.8 oz)   SpO2 98%   BMI 40.40 kg/m²     Body mass index is 40.4 kg/m².    Review of Systems   Constitutional: Negative for chills, fatigue and fever.   Eyes: Negative for blurred vision and visual disturbance.   Respiratory: Negative for cough, shortness of breath and wheezing.    Cardiovascular: Negative.    Gastrointestinal: Negative for constipation, diarrhea, nausea and vomiting.   Genitourinary: Negative for decreased urine " volume, dysuria and urgency.   Neurological: Negative for headache and confusion.   Psychiatric/Behavioral: Negative.        Past History:  Medical History: has a past medical history of ADD (attention deficit disorder), Alcoholism (HCC), Anxiety, Arthritis, Asthma, Bipolar disorder (HCC), Carpal tunnel syndrome, Depression, Diabetes (HCC), H/O cold sores, Hyperlipidemia, Hypertension, Kidney failure, Leukocytosis, Malignant tumor of breast (HCC), Obesity, and PCOS (polycystic ovarian syndrome).   Surgical History: has a past surgical history that includes Reduction mammaplasty (2003); Cholecystectomy; Ganglion cyst excision; and LEEP.   Family History: family history includes Breast cancer in her paternal aunt; Breast cancer (age of onset: 50) in her paternal aunt; Colon cancer in her maternal uncle; Depression in her mother; Hyperlipidemia in her father and mother; Hypertension in her father; Lung cancer in her mother.   Social History: reports that she has never smoked. She has never used smokeless tobacco. She reports current alcohol use. She reports that she does not use drugs.    PHQ-2 Depression Screening  Little interest or pleasure in doing things? 0-->not at all   Feeling down, depressed, or hopeless? 0-->not at all   PHQ-2 Total Score 0        PHQ-9 Depression Screening  Little interest or pleasure in doing things? 0-->not at all   Feeling down, depressed, or hopeless? 0-->not at all   Trouble falling or staying asleep, or sleeping too much?     Feeling tired or having little energy?     Poor appetite or overeating?     Feeling bad about yourself - or that you are a failure or have let yourself or your family down?     Trouble concentrating on things, such as reading the newspaper or watching television?     Moving or speaking so slowly that other people could have noticed? Or the opposite - being so fidgety or restless that you have been moving around a lot more than usual?     Thoughts that you would be  better off dead, or of hurting yourself in some way?     PHQ-9 Total Score 0   If you checked off any problems, how difficult have these problems made it for you to do your work, take care of things at home, or get along with other people?       PHQ-9 Total Score: 0               Current Outpatient Medications:   •  BIOTIN PO, Take  by mouth., Disp: , Rfl:   •  Bydureon BCise 2 MG/0.85ML auto-injector injection, Inject 0.85 mL under the skin into the appropriate area as directed 1 (One) Time Per Week., Disp: 2 pen, Rfl: 2  •  glucose blood test strip, check glucose 2 times daily and PRN., Disp: , Rfl:   •  lamoTRIgine (LaMICtal) 200 MG tablet, Take 1 tablet by mouth Daily., Disp: 90 tablet, Rfl: 1  •  lisinopril-hydrochlorothiazide (PRINZIDE,ZESTORETIC) 10-12.5 MG per tablet, Take 1 tablet by mouth Daily., Disp: 90 tablet, Rfl: 1  •  metFORMIN ER (GLUCOPHAGE-XR) 500 MG 24 hr tablet, Take 2 tablets by mouth Daily With Breakfast., Disp: 180 tablet, Rfl: 1  •  spironolactone (ALDACTONE) 25 MG tablet, Take 1 tablet by mouth Daily., Disp: 90 tablet, Rfl: 1  •  valACYclovir (VALTREX) 1000 MG tablet, , Disp: , Rfl:    (Not in a hospital admission)     Allergies: Morphine, Penicillins, and Flagyl [metronidazole]    Physical Exam  Constitutional:       Appearance: Normal appearance. She is normal weight.   Eyes:      Extraocular Movements: Extraocular movements intact.      Conjunctiva/sclera: Conjunctivae normal.      Pupils: Pupils are equal, round, and reactive to light.   Cardiovascular:      Rate and Rhythm: Normal rate and regular rhythm.      Heart sounds: Normal heart sounds.   Pulmonary:      Effort: Pulmonary effort is normal.      Breath sounds: Normal breath sounds.   Neurological:      General: No focal deficit present.      Mental Status: She is alert and oriented to person, place, and time. Mental status is at baseline.   Psychiatric:         Attention and Perception: Attention and perception normal.          Mood and Affect: Mood and affect normal.         Speech: Speech normal.         Behavior: Behavior normal. Behavior is cooperative.         Thought Content: Thought content normal. Thought content does not include homicidal or suicidal ideation. Thought content does not include homicidal or suicidal plan.         Cognition and Memory: Cognition normal.         Judgment: Judgment normal.          Result Review :                   Assessment and Plan    Diagnoses and all orders for this visit:    1. Benign essential HTN (Primary)  Assessment & Plan:  Meds refilled.  Labs drawn.  She will return for fasting lipid check.  Goal blood pressure less than 140/90.  Let me know if gets 2 or above that.  Proper diet and exercise plan discussed and encouraged.  Risk of meds discussed and understood.    Orders:  -     CBC & Differential; Future  -     Comprehensive Metabolic Panel; Future  -     TSH Rfx On Abnormal To Free T4; Future  -     POC Urinalysis Dipstick, Automated; Future  -     lisinopril-hydrochlorothiazide (PRINZIDE,ZESTORETIC) 10-12.5 MG per tablet; Take 1 tablet by mouth Daily.  Dispense: 90 tablet; Refill: 1  -     spironolactone (ALDACTONE) 25 MG tablet; Take 1 tablet by mouth Daily.  Dispense: 90 tablet; Refill: 1    2. Leukocytosis, unspecified type  Assessment & Plan:  Will recheck today.  She states she has seen hematology in the past and they told her it is likely due to inflammatory reaction.  She has been discharged from them and they state just to monitor and to return if it increases.      3. Mixed hyperlipidemia  Assessment & Plan:  We discussed starting a statin as she is a diabetic but she denied.  Risk discussed and understood.  Proper diet exercise plan discussed and encouraged.      4. Type 2 diabetes mellitus without complication, without long-term current use of insulin ([facility])  Assessment & Plan:  Meds refilled.  Labs drawn.  She wants to try Bydureon again.  I will prescribe that and we will  see if we can get a PA run on it.  Annual eye exam encouraged.  Check feet daily for sores.    Orders:  -     Hemoglobin A1c; Future  -     POCT microalbumin; Future  -     metFORMIN ER (GLUCOPHAGE-XR) 500 MG 24 hr tablet; Take 2 tablets by mouth Daily With Breakfast.  Dispense: 180 tablet; Refill: 1  -     Bydureon BCise 2 MG/0.85ML auto-injector injection; Inject 0.85 mL under the skin into the appropriate area as directed 1 (One) Time Per Week.  Dispense: 2 pen; Refill: 2    5. Vitamin deficiency  -     Vitamin B12; Future  -     Vitamin D 25 Hydroxy; Future    6. Screening for colon cancer  Assessment & Plan:  Patient know she is due both mammogram and colonoscopy.  She denied again me scheduling this today.  States she will let me know when she wants to do it.  Education provided.      7. Screening breast examination    8. History of cold sores    9. Major depressive disorder with current active episode, unspecified depression episode severity, unspecified whether recurrent  -     lamoTRIgine (LaMICtal) 200 MG tablet; Take 1 tablet by mouth Daily.  Dispense: 90 tablet; Refill: 1    10. Anger  -     lamoTRIgine (LaMICtal) 200 MG tablet; Take 1 tablet by mouth Daily.  Dispense: 90 tablet; Refill: 1            BMI is above normal parameters. Recommendations: exercise counseling/recommendations and nutrition counseling/recommendations        Follow Up   Return in about 6 months (around 10/15/2022).  Patient was given instructions and counseling regarding her condition or for health maintenance advice. Please see specific information pulled into the AVS if appropriate.     VALENCIA Mcghee

## 2022-04-16 LAB
25(OH)D3+25(OH)D2 SERPL-MCNC: 12.9 NG/ML (ref 30–100)
ALBUMIN SERPL-MCNC: 4.7 G/DL (ref 3.8–4.8)
ALBUMIN/GLOB SERPL: 1.6 {RATIO} (ref 1.2–2.2)
ALP SERPL-CCNC: 89 IU/L (ref 44–121)
ALT SERPL-CCNC: 28 IU/L (ref 0–32)
AST SERPL-CCNC: 36 IU/L (ref 0–40)
BASOPHILS # BLD AUTO: 0.1 X10E3/UL (ref 0–0.2)
BASOPHILS NFR BLD AUTO: 1 %
BILIRUB SERPL-MCNC: 0.3 MG/DL (ref 0–1.2)
BUN SERPL-MCNC: 14 MG/DL (ref 6–24)
BUN/CREAT SERPL: 17 (ref 9–23)
CALCIUM SERPL-MCNC: 10.1 MG/DL (ref 8.7–10.2)
CHLORIDE SERPL-SCNC: 98 MMOL/L (ref 96–106)
CHOLEST SERPL-MCNC: 192 MG/DL (ref 100–199)
CO2 SERPL-SCNC: 21 MMOL/L (ref 20–29)
CREAT SERPL-MCNC: 0.84 MG/DL (ref 0.57–1)
EGFRCR SERPLBLD CKD-EPI 2021: 85 ML/MIN/1.73
EOSINOPHIL # BLD AUTO: 0.3 X10E3/UL (ref 0–0.4)
EOSINOPHIL NFR BLD AUTO: 2 %
ERYTHROCYTE [DISTWIDTH] IN BLOOD BY AUTOMATED COUNT: 12.6 % (ref 11.7–15.4)
GLOBULIN SER CALC-MCNC: 2.9 G/DL (ref 1.5–4.5)
GLUCOSE SERPL-MCNC: 291 MG/DL (ref 65–99)
HBA1C MFR BLD: 10 % (ref 4.8–5.6)
HCT VFR BLD AUTO: 35.9 % (ref 34–46.6)
HDLC SERPL-MCNC: 49 MG/DL
HGB BLD-MCNC: 12.1 G/DL (ref 11.1–15.9)
IMM GRANULOCYTES # BLD AUTO: 0.1 X10E3/UL (ref 0–0.1)
IMM GRANULOCYTES NFR BLD AUTO: 1 %
LDLC SERPL CALC-MCNC: 107 MG/DL (ref 0–99)
LYMPHOCYTES # BLD AUTO: 3.5 X10E3/UL (ref 0.7–3.1)
LYMPHOCYTES NFR BLD AUTO: 29 %
MCH RBC QN AUTO: 30 PG (ref 26.6–33)
MCHC RBC AUTO-ENTMCNC: 33.7 G/DL (ref 31.5–35.7)
MCV RBC AUTO: 89 FL (ref 79–97)
MONOCYTES # BLD AUTO: 0.6 X10E3/UL (ref 0.1–0.9)
MONOCYTES NFR BLD AUTO: 5 %
NEUTROPHILS # BLD AUTO: 7.5 X10E3/UL (ref 1.4–7)
NEUTROPHILS NFR BLD AUTO: 62 %
PLATELET # BLD AUTO: 337 X10E3/UL (ref 150–450)
POTASSIUM SERPL-SCNC: 4 MMOL/L (ref 3.5–5.2)
PROT SERPL-MCNC: 7.6 G/DL (ref 6–8.5)
RBC # BLD AUTO: 4.03 X10E6/UL (ref 3.77–5.28)
SODIUM SERPL-SCNC: 135 MMOL/L (ref 134–144)
TRIGL SERPL-MCNC: 208 MG/DL (ref 0–149)
TSH SERPL DL<=0.005 MIU/L-ACNC: 1.08 UIU/ML (ref 0.45–4.5)
VIT B12 SERPL-MCNC: 420 PG/ML (ref 232–1245)
VLDLC SERPL CALC-MCNC: 36 MG/DL (ref 5–40)
WBC # BLD AUTO: 12 X10E3/UL (ref 3.4–10.8)

## 2022-04-18 RX ORDER — LANCETS
EACH MISCELLANEOUS
Qty: 100 EACH | Refills: 1 | Status: SHIPPED | OUTPATIENT
Start: 2022-04-18 | End: 2022-05-04 | Stop reason: SDUPTHER

## 2022-04-18 NOTE — TELEPHONE ENCOUNTER
carrie was approved by insurance with high co pay. Pt will call insurance to see if another option is available and let us know. Pharm could not give me any info. Also pt needs strips and lancets for Accu check/ guide me

## 2022-04-20 ENCOUNTER — TELEPHONE (OUTPATIENT)
Dept: FAMILY MEDICINE CLINIC | Facility: CLINIC | Age: 52
End: 2022-04-20

## 2022-04-20 ENCOUNTER — PATIENT MESSAGE (OUTPATIENT)
Dept: FAMILY MEDICINE CLINIC | Facility: CLINIC | Age: 52
End: 2022-04-20

## 2022-04-20 DIAGNOSIS — E11.9 TYPE 2 DIABETES MELLITUS WITHOUT COMPLICATION, WITHOUT LONG-TERM CURRENT USE OF INSULIN: ICD-10-CM

## 2022-04-20 RX ORDER — METFORMIN HYDROCHLORIDE 500 MG/1
1000 TABLET, EXTENDED RELEASE ORAL 2 TIMES DAILY WITH MEALS
Qty: 360 TABLET | Refills: 1 | Status: SHIPPED | OUTPATIENT
Start: 2022-04-20 | End: 2022-07-15 | Stop reason: SDUPTHER

## 2022-04-20 RX ORDER — ATORVASTATIN CALCIUM 10 MG/1
10 TABLET, FILM COATED ORAL DAILY
Qty: 30 TABLET | Refills: 2 | Status: SHIPPED | OUTPATIENT
Start: 2022-04-20 | End: 2022-08-09 | Stop reason: SDUPTHER

## 2022-04-20 RX ORDER — ERGOCALCIFEROL 1.25 MG/1
50000 CAPSULE ORAL WEEKLY
Qty: 12 CAPSULE | Refills: 0 | Status: SHIPPED | OUTPATIENT
Start: 2022-04-20 | End: 2022-10-14

## 2022-04-20 NOTE — TELEPHONE ENCOUNTER
Please make sure the patient is aware of the messages that I have put in there and attached to her lab results.  Thanks let me know

## 2022-04-21 ENCOUNTER — TELEPHONE (OUTPATIENT)
Dept: FAMILY MEDICINE CLINIC | Facility: CLINIC | Age: 52
End: 2022-04-21

## 2022-04-21 NOTE — TELEPHONE ENCOUNTER
Lotus from [facility] pharmacy called, she has a question on medication that was called in. She said the one that was called in they do not have anymore.

## 2022-05-04 ENCOUNTER — PATIENT MESSAGE (OUTPATIENT)
Dept: FAMILY MEDICINE CLINIC | Facility: CLINIC | Age: 52
End: 2022-05-04

## 2022-05-04 ENCOUNTER — TELEPHONE (OUTPATIENT)
Dept: FAMILY MEDICINE CLINIC | Facility: CLINIC | Age: 52
End: 2022-05-04

## 2022-05-04 RX ORDER — LANCETS
EACH MISCELLANEOUS
Qty: 100 EACH | Refills: 5 | Status: SHIPPED | OUTPATIENT
Start: 2022-05-04

## 2022-05-04 NOTE — TELEPHONE ENCOUNTER
----- Message from Jeni Stoner MA sent at 5/4/2022 12:03 PM EDT -----  Regarding: FW: Diabetic Testing Supplies  Rubens, I was going to send this in but not sure how many times a day she should be testing.  ----- Message -----  From: Emily Stephensportyasir Heller  Sent: 5/4/2022  11:30 AM EDT  To: [facility]  Subject: Diabetic Testing Supplies                        Thank you for refilling my test strips and needles.  However, I have a different meter than what those items was prescribed for.  I have an Accu-Check Guide Me meter, and the pharmacy requires a prescription that designates that particular meter. If you wouldn't mind to send a script over for needles and test strips for an Accu-Check Guide Me meter, I would appreciate it.  Also, I'm not sure how often you would like me to check my sugar, but I am guessing I should be checking it at least before each meal and possibly in the morning?  Please include enough strips per month to accommodate the number of times I need to test. Thank you!

## 2022-05-04 NOTE — TELEPHONE ENCOUNTER
See message below.  I cannot find it in the system so we will somebody please call into her pharmacy the LANCETS and TEST STRIPS for the  Accu-Check Guide Me meter.    Type 2 diabetes.    Quantity 100 each with 5 refills.    Check blood sugar TID and PRN.     Thanks

## 2022-05-13 ENCOUNTER — TELEPHONE (OUTPATIENT)
Dept: FAMILY MEDICINE CLINIC | Facility: CLINIC | Age: 52
End: 2022-05-13

## 2022-05-13 NOTE — TELEPHONE ENCOUNTER
Lindsay we please call her pharmacy now in order lancets and test strips for her glucose meter.  Patient states that his Accu-Chek guide me meter.  Quantity 100 each with 1 refill.  Check glucose daily and then as needed.    After you do this please call patient and let her know that we have done it.

## 2022-05-13 NOTE — TELEPHONE ENCOUNTER
----- Message from Brenda Ewing MA sent at 5/12/2022  4:14 PM EDT -----  Regarding: FW: Diabetic Testing Supplies    ----- Message -----  From: Emily Heller  Sent: 5/12/2022  12:36 PM EDT  To: [facility]  Subject: Diabetic Testing Supplies                        Hello....no test strips or lancets yet!? AccuCheck Guide Me meter.    Thanks!  Emily

## 2022-07-15 ENCOUNTER — PATIENT MESSAGE (OUTPATIENT)
Dept: FAMILY MEDICINE CLINIC | Facility: CLINIC | Age: 52
End: 2022-07-15

## 2022-07-15 DIAGNOSIS — E11.9 TYPE 2 DIABETES MELLITUS WITHOUT COMPLICATION, WITHOUT LONG-TERM CURRENT USE OF INSULIN: ICD-10-CM

## 2022-07-15 RX ORDER — METFORMIN HYDROCHLORIDE 500 MG/1
1000 TABLET, EXTENDED RELEASE ORAL 2 TIMES DAILY WITH MEALS
Qty: 360 TABLET | Refills: 0 | Status: SHIPPED | OUTPATIENT
Start: 2022-07-15 | End: 2022-09-23 | Stop reason: SDUPTHER

## 2022-07-28 ENCOUNTER — TRANSCRIBE ORDERS (OUTPATIENT)
Dept: ADMINISTRATIVE | Facility: HOSPITAL | Age: 52
End: 2022-07-28

## 2022-07-28 DIAGNOSIS — Z12.31 VISIT FOR SCREENING MAMMOGRAM: Primary | ICD-10-CM

## 2022-08-09 ENCOUNTER — PATIENT MESSAGE (OUTPATIENT)
Dept: FAMILY MEDICINE CLINIC | Facility: CLINIC | Age: 52
End: 2022-08-09

## 2022-08-09 RX ORDER — ATORVASTATIN CALCIUM 10 MG/1
10 TABLET, FILM COATED ORAL DAILY
Qty: 30 TABLET | Refills: 0 | Status: SHIPPED | OUTPATIENT
Start: 2022-08-09 | End: 2022-09-09

## 2022-09-09 ENCOUNTER — HOSPITAL ENCOUNTER (OUTPATIENT)
Dept: MAMMOGRAPHY | Facility: HOSPITAL | Age: 52
Discharge: HOME | End: 2022-09-09
Admitting: OBSTETRICS & GYNECOLOGY

## 2022-09-09 ENCOUNTER — OFFICE VISIT (OUTPATIENT)
Dept: OBSTETRICS AND GYNECOLOGY | Facility: CLINIC | Age: 52
End: 2022-09-09

## 2022-09-09 ENCOUNTER — TELEPHONE (OUTPATIENT)
Dept: FAMILY MEDICINE CLINIC | Facility: CLINIC | Age: 52
End: 2022-09-09

## 2022-09-09 VITALS
DIASTOLIC BLOOD PRESSURE: 78 MMHG | HEIGHT: 65 IN | WEIGHT: 245.2 LBS | SYSTOLIC BLOOD PRESSURE: 118 MMHG | BODY MASS INDEX: 40.85 KG/M2

## 2022-09-09 DIAGNOSIS — Z12.31 VISIT FOR SCREENING MAMMOGRAM: ICD-10-CM

## 2022-09-09 DIAGNOSIS — R93.89 THICKENED ENDOMETRIUM: ICD-10-CM

## 2022-09-09 DIAGNOSIS — N92.1 MENORRHAGIA WITH IRREGULAR CYCLE: Primary | ICD-10-CM

## 2022-09-09 DIAGNOSIS — Z01.419 ENCOUNTER FOR ANNUAL ROUTINE GYNECOLOGICAL EXAMINATION: ICD-10-CM

## 2022-09-09 PROCEDURE — 99213 OFFICE O/P EST LOW 20 MIN: CPT | Performed by: OBSTETRICS & GYNECOLOGY

## 2022-09-09 PROCEDURE — 77067 SCR MAMMO BI INCL CAD: CPT

## 2022-09-09 PROCEDURE — 99396 PREV VISIT EST AGE 40-64: CPT | Performed by: OBSTETRICS & GYNECOLOGY

## 2022-09-09 PROCEDURE — 77063 BREAST TOMOSYNTHESIS BI: CPT

## 2022-09-09 RX ORDER — SITAGLIPTIN 100 MG/1
TABLET, FILM COATED ORAL
Qty: 30 TABLET | Refills: 0 | Status: SHIPPED | OUTPATIENT
Start: 2022-09-09 | End: 2022-09-23 | Stop reason: SDUPTHER

## 2022-09-09 RX ORDER — ATORVASTATIN CALCIUM 10 MG/1
TABLET, FILM COATED ORAL
Qty: 30 TABLET | Refills: 0 | Status: SHIPPED | OUTPATIENT
Start: 2022-09-09 | End: 2022-09-23 | Stop reason: SDUPTHER

## 2022-09-09 NOTE — PROGRESS NOTES
Gynecologic Annual Exam Note          GYN Annual Exam     Gynecologic Exam (US today)        Subjective     HPI  Emily Heller is a 52 y.o. female, , who presents for annual well woman exam as a established patient but a new patient to Dr Leavitt. Patient's last menstrual period was 2022 (exact date)..  Periods are irregular. Patient states she skip a month here and there and periods last anywhere from 1-3 weeks.She reports flow is heavy on the first and second day then become really light. Patient reports problems with: none.  Partner Status: Marital Status: . She is is sexually active. She has not had new partners.. STD testing recommendations have been explained to the patient and she does not desire STD testing. There were no changes to her medical or surgical history since her last visit..     Reviewed US today with thickened endometrial lining and thickened endocervical canal. Discussed finding and recommend surgical evaluation.     Additional OB/GYN History   Current contraception: contraceptive methods: Vasectomy   Desires to: do not start contraception    Last Pap : 2021. Result: negative. HPV: negative  Last Completed Pap Smear          Ordered - PAP SMEAR (Every 3 Years) Ordered on 2021  SCANNED - PAP SMEAR    2020  Done - negative              History of abnormal Pap smear: yes   Family history of uterine, colon, breast, or ovarian cancer: yes - Maternal uncle, colon cancer and Paternal Aunt, Breast cancer  Performs monthly Self-Breast Exam: no  Last mammogram: 2022. Done at [facility].    Last Completed Mammogram     This patient has no relevant Health Maintenance data.          History of abnormal mammogram: no    Colonoscopy: has never had a colonoscopy.  Exercises Regularly: no  Feelings of Anxiety or Depression: no  Tobacco Usage?: No       Current Outpatient Medications:   •  atorvastatin (LIPITOR) 10 MG tablet, TAKE ONE TABLET  BY MOUTH DAILY, Disp: 30 tablet, Rfl: 0  •  BIOTIN PO, Take  by mouth., Disp: , Rfl:   •  glucose blood test strip, Check fsbs tid and prn; Accu-Chek Guide Me Strips; E11.9, Disp: 100 each, Rfl: 5  •  Januvia 100 MG tablet, TAKE ONE TABLET BY MOUTH DAILY, Disp: 30 tablet, Rfl: 0  •  lamoTRIgine (LaMICtal) 200 MG tablet, Take 1 tablet by mouth Daily., Disp: 90 tablet, Rfl: 1  •  Lancets (accu-chek multiclix) lancets, Check glucose tid and prn; Accu-Chek Guide Me lancets; E11.9, Disp: 100 each, Rfl: 5  •  lisinopril-hydrochlorothiazide (PRINZIDE,ZESTORETIC) 10-12.5 MG per tablet, Take 1 tablet by mouth Daily., Disp: 90 tablet, Rfl: 1  •  metFORMIN ER (GLUCOPHAGE-XR) 500 MG 24 hr tablet, Take 2 tablets by mouth 2 (Two) Times a Day With Meals., Disp: 360 tablet, Rfl: 0  •  spironolactone (ALDACTONE) 25 MG tablet, Take 1 tablet by mouth Daily., Disp: 90 tablet, Rfl: 1  •  valACYclovir (VALTREX) 1000 MG tablet, , Disp: , Rfl:   •  vitamin D (ERGOCALCIFEROL) 1.25 MG (78162 UT) capsule capsule, Take 1 capsule by mouth 1 (One) Time Per Week., Disp: 12 capsule, Rfl: 0     Patient denies the need for medication refills today.    OB History        3    Para   3    Term   3            AB        Living           SAB        IAB        Ectopic        Molar        Multiple        Live Births                    Past Medical History:   Diagnosis Date   • ADD (attention deficit disorder)    • Alcoholism (HCC)    • Anxiety    • Arthritis    • Asthma    • Bipolar disorder (HCC)    • Carpal tunnel syndrome    • Depression    • Diabetes (HCC)    • H/O cold sores    • Hyperlipidemia    • Hypertension    • Kidney failure    • Leukocytosis    • Obesity    • PCOS (polycystic ovarian syndrome)         Past Surgical History:   Procedure Laterality Date   • CHOLECYSTECTOMY     • GANGLION CYST EXCISION     • LEEP     • REDUCTION MAMMAPLASTY         Health Maintenance   Topic Date Due   • COVID-19 Vaccine (1) Never done   • ANNUAL  "PHYSICAL  Never done   • ZOSTER VACCINE (1 of 2) Never done   • MAMMOGRAM  07/13/2022   • Annual Gynecologic Pelvic and Breast Exam  08/25/2022   • HEPATITIS C SCREENING  04/15/2023 (Originally 8/17/2021)   • Hepatitis B (1 of 3 - Risk 3-dose series) 04/15/2023 (Originally 5/30/1989)   • Pneumococcal Vaccine 0-64 (1 - PCV) 04/15/2023 (Originally 5/30/1976)   • INFLUENZA VACCINE  10/01/2022   • HEMOGLOBIN A1C  10/15/2022   • DIABETIC EYE EXAM  03/01/2023   • DIABETIC FOOT EXAM  04/15/2023   • LIPID PANEL  04/15/2023   • URINE MICROALBUMIN  04/15/2023   • TDAP/TD VACCINES (2 - Td or Tdap) 05/05/2024   • PAP SMEAR  08/24/2024   • COLORECTAL CANCER SCREENING  04/15/2032       The additional following portions of the patient's history were reviewed and updated as appropriate: allergies, current medications, past family history, past medical history, past social history, past surgical history and problem list.    Review of Systems   Constitutional: Negative.    Respiratory: Negative.    Cardiovascular: Negative.    Gastrointestinal: Negative.    Genitourinary: Positive for menstrual problem. Negative for breast discharge, breast lump, breast pain and pelvic pain.   Musculoskeletal: Negative.    Neurological: Negative.    Psychiatric/Behavioral: Negative.          I have reviewed and agree with the HPI, ROS, and historical information as entered above. Amish Leavitt MD      Objective   /78   Ht 165.1 cm (65\")   Wt 111 kg (245 lb 3.2 oz)   LMP 08/23/2022 (Exact Date)   Breastfeeding No   BMI 40.80 kg/m²     Physical Exam  Vitals reviewed. Exam conducted with a chaperone present.   Constitutional:       Appearance: Normal appearance.   HENT:      Head: Normocephalic and atraumatic.   Cardiovascular:      Rate and Rhythm: Normal rate and regular rhythm.   Pulmonary:      Effort: Pulmonary effort is normal.      Breath sounds: Normal breath sounds.   Chest:   Breasts:      Right: Normal.      Left: Normal. "       Abdominal:      General: Abdomen is flat. Bowel sounds are normal.      Palpations: Abdomen is soft.   Genitourinary:     General: Normal vulva.      Vagina: Normal.      Cervix: Normal.      Uterus: Normal.       Adnexa: Right adnexa normal and left adnexa normal.            Comments: Cervical polyp visualized  Musculoskeletal:      Cervical back: Neck supple.   Skin:     General: Skin is warm and dry.   Neurological:      Mental Status: She is alert and oriented to person, place, and time.   Psychiatric:         Mood and Affect: Mood normal.         Behavior: Behavior normal.            Assessment and Plan    Problem List Items Addressed This Visit        Genitourinary and Reproductive     Thickened endometrium    Menorrhagia with irregular cycle - Primary    Relevant Orders    US Non-ob Transvaginal (Completed)      Other Visit Diagnoses     Encounter for annual routine gynecological examination        Relevant Orders    LIQUID-BASED PAP SMEAR, [facility] ([facility])    Ambulatory Referral For Screening Colonoscopy          1. GYN annual well woman exam.   2. Pap guidelines reviewed.  3. Reviewed monthly self breast exams.  Instructed to call with lumps, pain, or breast discharge.    4. Recommended use of Vitamin D replacement and getting adequate calcium in her diet. (1500mg)  5. Reviewed BMI and weight loss as preventative health measures.   6. Reviewed exercise as a preventative health measures.   7. Reccommended Flu Vaccine in Fall of each year.  8. Discussed importance of routine colon cancer screening. Reviewed current guidelines. Recommended colonoscopy after age 45.  9. Return for Will set up for hys, D&C, polypectomy. Zulema will set up f/u appt. Sent colonoscopy referral.     Amish Leavitt MD  09/09/2022

## 2022-09-14 LAB — REF LAB TEST METHOD: NORMAL

## 2022-09-23 ENCOUNTER — OFFICE VISIT (OUTPATIENT)
Dept: FAMILY MEDICINE CLINIC | Facility: CLINIC | Age: 52
End: 2022-09-23

## 2022-09-23 VITALS
WEIGHT: 241.8 LBS | HEART RATE: 87 BPM | HEIGHT: 65 IN | OXYGEN SATURATION: 98 % | DIASTOLIC BLOOD PRESSURE: 66 MMHG | BODY MASS INDEX: 40.29 KG/M2 | SYSTOLIC BLOOD PRESSURE: 120 MMHG

## 2022-09-23 DIAGNOSIS — E56.9 VITAMIN DEFICIENCY: ICD-10-CM

## 2022-09-23 DIAGNOSIS — R45.4 ANGER: ICD-10-CM

## 2022-09-23 DIAGNOSIS — I10 BENIGN ESSENTIAL HTN: ICD-10-CM

## 2022-09-23 DIAGNOSIS — E78.2 MIXED HYPERLIPIDEMIA: Primary | ICD-10-CM

## 2022-09-23 DIAGNOSIS — E11.9 TYPE 2 DIABETES MELLITUS WITHOUT COMPLICATION, WITHOUT LONG-TERM CURRENT USE OF INSULIN: ICD-10-CM

## 2022-09-23 DIAGNOSIS — Z79.899 ENCOUNTER FOR LONG-TERM (CURRENT) USE OF OTHER MEDICATIONS: ICD-10-CM

## 2022-09-23 DIAGNOSIS — F31.81 BIPOLAR 2 DISORDER: ICD-10-CM

## 2022-09-23 DIAGNOSIS — F32.9 MAJOR DEPRESSIVE DISORDER WITH CURRENT ACTIVE EPISODE, UNSPECIFIED DEPRESSION EPISODE SEVERITY, UNSPECIFIED WHETHER RECURRENT: ICD-10-CM

## 2022-09-23 DIAGNOSIS — Z86.19 HISTORY OF COLD SORES: ICD-10-CM

## 2022-09-23 LAB — POC MICROALBUMIN URINE: 20

## 2022-09-23 PROCEDURE — 99214 OFFICE O/P EST MOD 30 MIN: CPT | Performed by: NURSE PRACTITIONER

## 2022-09-23 PROCEDURE — 82044 UR ALBUMIN SEMIQUANTITATIVE: CPT | Performed by: NURSE PRACTITIONER

## 2022-09-23 RX ORDER — METFORMIN HYDROCHLORIDE 500 MG/1
1000 TABLET, EXTENDED RELEASE ORAL 2 TIMES DAILY WITH MEALS
Qty: 360 TABLET | Refills: 1 | Status: SHIPPED | OUTPATIENT
Start: 2022-09-23 | End: 2023-01-06 | Stop reason: SDUPTHER

## 2022-09-23 RX ORDER — SPIRONOLACTONE 25 MG/1
25 TABLET ORAL DAILY
Qty: 90 TABLET | Refills: 1 | Status: SHIPPED | OUTPATIENT
Start: 2022-09-23

## 2022-09-23 RX ORDER — LAMOTRIGINE 200 MG/1
200 TABLET ORAL DAILY
Qty: 90 TABLET | Refills: 1 | Status: SHIPPED | OUTPATIENT
Start: 2022-09-23 | End: 2023-03-27

## 2022-09-23 RX ORDER — LISINOPRIL AND HYDROCHLOROTHIAZIDE 12.5; 1 MG/1; MG/1
1 TABLET ORAL DAILY
Qty: 90 TABLET | Refills: 1 | Status: SHIPPED | OUTPATIENT
Start: 2022-09-23

## 2022-09-23 RX ORDER — ATORVASTATIN CALCIUM 10 MG/1
10 TABLET, FILM COATED ORAL DAILY
Qty: 90 TABLET | Refills: 1 | Status: SHIPPED | OUTPATIENT
Start: 2022-09-23 | End: 2023-03-29

## 2022-09-23 RX ORDER — VALACYCLOVIR HYDROCHLORIDE 1 G/1
TABLET, FILM COATED ORAL
Qty: 20 TABLET | Refills: 1 | Status: SHIPPED | OUTPATIENT
Start: 2022-09-23

## 2022-09-23 NOTE — PROGRESS NOTES
"Chief Complaint  Med Refill    Subjective          Emily Heller presents to [facility]  History of Present Illness     Presents for med refills.  On atorvastatin for hyperlipidemia.  On Lamictal for bipolar type II.  States she was seeing a specialist for her bipolar type II but states she has been on Lamictal for 7 years and is doing fine on it with no issues or concerns so she is asking if we can just refill it here.  Hypertension well-controlled on lisinopril, hydrochlorothiazide, spironolactone.  For diabetes she is on Januvia and metformin 1000 mg twice daily.  States it is hit or miss if she takes her metformin twice daily.  States she usually always remembers at night but states her morning dose she forgets more so than not.  States she is up-to-date on eye exams and states her feet are fine with no cuts or sores.  She knows that her diet is not anywhere near where it needs to be and she knows this is why her diabetes is not greatly controlled.  States her  is a long-acting insulin if her A1c comes back out of range she would like to consider doing a long-acting insulin as she states she thinks she would be more likely to do 1 time a day thing.  No dizziness no headache no chest pain no chest pressure no shortness of breath no trouble breathing no urinary or bowel issues.  Also has history of cold sores and is asking for refill of the Valtrex.    Objective   Vital Signs:   /66   Pulse 87   Ht 165.1 cm (65\")   Wt 110 kg (241 lb 12.8 oz)   SpO2 98%   BMI 40.24 kg/m²     Body mass index is 40.24 kg/m².    Review of Systems   Constitutional: Negative for chills, fatigue and fever.   HENT: Negative for congestion.    Eyes: Negative for visual disturbance.   Respiratory: Negative for cough, shortness of breath and wheezing.    Cardiovascular: Negative for chest pain, palpitations and leg swelling.   Gastrointestinal: Negative for diarrhea, nausea and " vomiting.   Endocrine: Negative for polydipsia, polyphagia and polyuria.   Genitourinary: Negative for decreased urine volume, dysuria, frequency, hematuria and urgency.   Skin: Negative for rash.   Neurological: Negative for dizziness and headache.   Psychiatric/Behavioral: Negative.        Past History:  Medical History: has a past medical history of ADD (attention deficit disorder), Alcoholism ([facility]), Anxiety, Arthritis, Asthma, Bipolar disorder ([facility]), Carpal tunnel syndrome, Depression, Diabetes ([facility]), H/O cold sores, Hyperlipidemia, Hypertension, Kidney failure, Leukocytosis, Obesity, and PCOS (polycystic ovarian syndrome).   Surgical History: has a past surgical history that includes Reduction mammaplasty (2003); Cholecystectomy; Ganglion cyst excision; and LEEP.   Family History: family history includes Breast cancer in her paternal aunt; Breast cancer (age of onset: 50) in her paternal aunt; Colon cancer in her maternal uncle; Depression in her mother; Hyperlipidemia in her father and mother; Hypertension in her father; Lung cancer in her mother.   Social History: reports that she quit smoking about 20 years ago. Her smoking use included cigarettes. She has never used smokeless tobacco. She reports current alcohol use. She reports that she does not use drugs.    PHQ-2 Depression Screening  Little interest or pleasure in doing things? 0-->not at all   Feeling down, depressed, or hopeless? 0-->not at all   PHQ-2 Total Score 0        PHQ-9 Depression Screening  Little interest or pleasure in doing things? 0-->not at all   Feeling down, depressed, or hopeless? 0-->not at all   Trouble falling or staying asleep, or sleeping too much?     Feeling tired or having little energy?     Poor appetite or overeating?     Feeling bad about yourself - or that you are a failure or have let yourself or your family down?     Trouble concentrating on things, such as reading the newspaper or watching television?     Moving or  speaking so slowly that other people could have noticed? Or the opposite - being so fidgety or restless that you have been moving around a lot more than usual?     Thoughts that you would be better off dead, or of hurting yourself in some way?     PHQ-9 Total Score 0   If you checked off any problems, how difficult have these problems made it for you to do your work, take care of things at home, or get along with other people?       PHQ-9 Total Score: 0      Patient screened positive for depression based on a PHQ-9 score of 0 on 9/23/2022. Follow-up recommendations include:          Current Outpatient Medications:   •  atorvastatin (LIPITOR) 10 MG tablet, Take 1 tablet by mouth Daily., Disp: 90 tablet, Rfl: 1  •  BIOTIN PO, Take  by mouth., Disp: , Rfl:   •  glucose blood test strip, Check fsbs tid and prn; Accu-Chek Guide Me Strips; E11.9, Disp: 100 each, Rfl: 5  •  lamoTRIgine (LaMICtal) 200 MG tablet, Take 1 tablet by mouth Daily., Disp: 90 tablet, Rfl: 1  •  Lancets (accu-chek multiclix) lancets, Check glucose tid and prn; Accu-Chek Guide Me lancets; E11.9, Disp: 100 each, Rfl: 5  •  lisinopril-hydrochlorothiazide (PRINZIDE,ZESTORETIC) 10-12.5 MG per tablet, Take 1 tablet by mouth Daily., Disp: 90 tablet, Rfl: 1  •  metFORMIN ER (GLUCOPHAGE-XR) 500 MG 24 hr tablet, Take 2 tablets by mouth 2 (Two) Times a Day With Meals., Disp: 360 tablet, Rfl: 1  •  SITagliptin (Januvia) 100 MG tablet, Take 1 tablet by mouth Daily., Disp: 90 tablet, Rfl: 1  •  spironolactone (ALDACTONE) 25 MG tablet, Take 1 tablet by mouth Daily., Disp: 90 tablet, Rfl: 1  •  valACYclovir (VALTREX) 1000 MG tablet, Take 2 tabs PO q12h x 1 day PRN cold sore. Start ASAP after sx onset., Disp: 20 tablet, Rfl: 1  •  vitamin D (ERGOCALCIFEROL) 1.25 MG (03248 UT) capsule capsule, Take 1 capsule by mouth 1 (One) Time Per Week., Disp: 12 capsule, Rfl: 0   (Not in a hospital admission)     Allergies: Morphine, Penicillins, and Flagyl  [metronidazole]    Physical Exam  Constitutional:       Appearance: Normal appearance.   Eyes:      Extraocular Movements: Extraocular movements intact.      Conjunctiva/sclera: Conjunctivae normal.      Pupils: Pupils are equal, round, and reactive to light.   Cardiovascular:      Rate and Rhythm: Normal rate and regular rhythm.      Heart sounds: Normal heart sounds.   Pulmonary:      Effort: Pulmonary effort is normal.      Breath sounds: Normal breath sounds.   Neurological:      General: No focal deficit present.      Mental Status: She is alert and oriented to person, place, and time. Mental status is at baseline.   Psychiatric:         Attention and Perception: Attention and perception normal.         Mood and Affect: Mood and affect normal.         Speech: Speech normal.         Behavior: Behavior normal. Behavior is cooperative.         Thought Content: Thought content normal. Thought content does not include homicidal or suicidal ideation. Thought content does not include homicidal or suicidal plan.         Cognition and Memory: Cognition and memory normal.         Judgment: Judgment normal.          Result Review :                   Assessment and Plan    Diagnoses and all orders for this visit:    1. Mixed hyperlipidemia (Primary)  Assessment & Plan:  Meds refilled.  Fasting labs drawn.  Proper diet and exercise plan discussed and encouraged.  Risk discussed understood.  Education provided.  Return to clinic or ED with any issues or concerns.    Orders:  -     Lipid Panel; Future  -     atorvastatin (LIPITOR) 10 MG tablet; Take 1 tablet by mouth Daily.  Dispense: 90 tablet; Refill: 1  -     Lipid Panel    2. Benign essential HTN  Assessment & Plan:  Goal blood pressure less than 140/90.  Let me know if gets to or above that.    Orders:  -     lisinopril-hydrochlorothiazide (PRINZIDE,ZESTORETIC) 10-12.5 MG per tablet; Take 1 tablet by mouth Daily.  Dispense: 90 tablet; Refill: 1  -     spironolactone  (ALDACTONE) 25 MG tablet; Take 1 tablet by mouth Daily.  Dispense: 90 tablet; Refill: 1    3. Vitamin deficiency  -     Vitamin D 25 Hydroxy; Future  -     Vitamin D 25 Hydroxy    4. Type 2 diabetes mellitus without complication, without long-term current use of insulin (HCC)  Assessment & Plan:  Labs drawn.  Meds refilled.  Micro completed.  Annual eye exams encouraged.  Check feet daily.  I stressed the importance of her taking her medications daily as prescribed.  She states if her A1c comes back uncontrolled she would then want to consider adding on a long-acting insulin.  Education provided.  Risk discussed understood.  Return to clinic or ED with any issues or concerns.    Orders:  -     Hemoglobin A1c; Future  -     POC Microalbumin; Future  -     SITagliptin (Januvia) 100 MG tablet; Take 1 tablet by mouth Daily.  Dispense: 90 tablet; Refill: 1  -     metFORMIN ER (GLUCOPHAGE-XR) 500 MG 24 hr tablet; Take 2 tablets by mouth 2 (Two) Times a Day With Meals.  Dispense: 360 tablet; Refill: 1  -     Hemoglobin A1c    5. Encounter for long-term (current) use of other medications  -     CBC & Differential; Future  -     Comprehensive Metabolic Panel; Future  -     CBC & Differential  -     Comprehensive Metabolic Panel    6. Major depressive disorder with current active episode, unspecified depression episode severity, unspecified whether recurrent  -     lamoTRIgine (LaMICtal) 200 MG tablet; Take 1 tablet by mouth Daily.  Dispense: 90 tablet; Refill: 1    7. Anger  -     lamoTRIgine (LaMICtal) 200 MG tablet; Take 1 tablet by mouth Daily.  Dispense: 90 tablet; Refill: 1    8. History of cold sores  Assessment & Plan:  Valtrex refilled to take as needed cold sores.    Orders:  -     valACYclovir (VALTREX) 1000 MG tablet; Take 2 tabs PO q12h x 1 day PRN cold sore. Start ASAP after sx onset.  Dispense: 20 tablet; Refill: 1    9. Bipolar 2 disorder (HCC)  Assessment & Plan:  Lamictal refilled.  PHQ-9 completed  discussed.  No thoughts of suicide hurting herself or anyone else.  Risk of meds discussed understood.  Return to clinic or ED with any issues or concerns.              Class 3 Severe Obesity (BMI >=40). Obesity-related health conditions include the following: hypertension, diabetes mellitus and dyslipidemias. Obesity is improving with treatment. BMI is is above average; BMI management plan is completed. We discussed portion control and increasing exercise.       Follow Up   Return in about 3 months (around 12/23/2022).  Patient was given instructions and counseling regarding her condition or for health maintenance advice. Please see specific information pulled into the AVS if appropriate.     VALENCIA Mcghee

## 2022-09-23 NOTE — ASSESSMENT & PLAN NOTE
Meds refilled.  Fasting labs drawn.  Proper diet and exercise plan discussed and encouraged.  Risk discussed understood.  Education provided.  Return to clinic or ED with any issues or concerns.

## 2022-09-23 NOTE — ASSESSMENT & PLAN NOTE
Lamictal refilled.  PHQ-9 completed discussed.  No thoughts of suicide hurting herself or anyone else.  Risk of meds discussed understood.  Return to clinic or ED with any issues or concerns.

## 2022-09-23 NOTE — ASSESSMENT & PLAN NOTE
Labs drawn.  Meds refilled.  Micro completed.  Annual eye exams encouraged.  Check feet daily.  I stressed the importance of her taking her medications daily as prescribed.  She states if her A1c comes back uncontrolled she would then want to consider adding on a long-acting insulin.  Education provided.  Risk discussed understood.  Return to clinic or ED with any issues or concerns.

## 2022-09-24 LAB
25(OH)D3+25(OH)D2 SERPL-MCNC: 26.4 NG/ML (ref 30–100)
ALBUMIN SERPL-MCNC: 4.7 G/DL (ref 3.8–4.9)
ALBUMIN/GLOB SERPL: 1.6 {RATIO} (ref 1.2–2.2)
ALP SERPL-CCNC: 98 IU/L (ref 44–121)
ALT SERPL-CCNC: 18 IU/L (ref 0–32)
AST SERPL-CCNC: 30 IU/L (ref 0–40)
BASOPHILS # BLD AUTO: 0.1 X10E3/UL (ref 0–0.2)
BASOPHILS NFR BLD AUTO: 1 %
BILIRUB SERPL-MCNC: 0.3 MG/DL (ref 0–1.2)
BUN SERPL-MCNC: 12 MG/DL (ref 6–24)
BUN/CREAT SERPL: 15 (ref 9–23)
CALCIUM SERPL-MCNC: 10 MG/DL (ref 8.7–10.2)
CHLORIDE SERPL-SCNC: 97 MMOL/L (ref 96–106)
CHOLEST SERPL-MCNC: 163 MG/DL (ref 100–199)
CO2 SERPL-SCNC: 24 MMOL/L (ref 20–29)
CREAT SERPL-MCNC: 0.8 MG/DL (ref 0.57–1)
EGFRCR SERPLBLD CKD-EPI 2021: 89 ML/MIN/1.73
EOSINOPHIL # BLD AUTO: 0.2 X10E3/UL (ref 0–0.4)
EOSINOPHIL NFR BLD AUTO: 2 %
ERYTHROCYTE [DISTWIDTH] IN BLOOD BY AUTOMATED COUNT: 13.2 % (ref 11.7–15.4)
GLOBULIN SER CALC-MCNC: 3 G/DL (ref 1.5–4.5)
GLUCOSE SERPL-MCNC: 227 MG/DL (ref 65–99)
HBA1C MFR BLD: 10.7 % (ref 4.8–5.6)
HCT VFR BLD AUTO: 36 % (ref 34–46.6)
HDLC SERPL-MCNC: 48 MG/DL
HGB BLD-MCNC: 12.1 G/DL (ref 11.1–15.9)
IMM GRANULOCYTES # BLD AUTO: 0.1 X10E3/UL (ref 0–0.1)
IMM GRANULOCYTES NFR BLD AUTO: 1 %
LDLC SERPL CALC-MCNC: 96 MG/DL (ref 0–99)
LYMPHOCYTES # BLD AUTO: 3.8 X10E3/UL (ref 0.7–3.1)
LYMPHOCYTES NFR BLD AUTO: 30 %
MCH RBC QN AUTO: 30 PG (ref 26.6–33)
MCHC RBC AUTO-ENTMCNC: 33.6 G/DL (ref 31.5–35.7)
MCV RBC AUTO: 89 FL (ref 79–97)
MONOCYTES # BLD AUTO: 0.8 X10E3/UL (ref 0.1–0.9)
MONOCYTES NFR BLD AUTO: 6 %
NEUTROPHILS # BLD AUTO: 7.9 X10E3/UL (ref 1.4–7)
NEUTROPHILS NFR BLD AUTO: 60 %
PLATELET # BLD AUTO: 377 X10E3/UL (ref 150–450)
POTASSIUM SERPL-SCNC: 4.6 MMOL/L (ref 3.5–5.2)
PROT SERPL-MCNC: 7.7 G/DL (ref 6–8.5)
RBC # BLD AUTO: 4.03 X10E6/UL (ref 3.77–5.28)
SODIUM SERPL-SCNC: 138 MMOL/L (ref 134–144)
TRIGL SERPL-MCNC: 105 MG/DL (ref 0–149)
VLDLC SERPL CALC-MCNC: 19 MG/DL (ref 5–40)
WBC # BLD AUTO: 12.9 X10E3/UL (ref 3.4–10.8)

## 2022-09-28 ENCOUNTER — TELEPHONE (OUTPATIENT)
Dept: FAMILY MEDICINE CLINIC | Facility: CLINIC | Age: 52
End: 2022-09-28

## 2022-09-30 ENCOUNTER — APPOINTMENT (OUTPATIENT)
Dept: PREADMISSION TESTING | Facility: HOSPITAL | Age: 52
End: 2022-09-30

## 2022-10-04 ENCOUNTER — PRE-ADMISSION TESTING (OUTPATIENT)
Dept: PREADMISSION TESTING | Facility: HOSPITAL | Age: 52
End: 2022-10-04

## 2022-10-04 ENCOUNTER — OFFICE VISIT (OUTPATIENT)
Dept: OBSTETRICS AND GYNECOLOGY | Facility: CLINIC | Age: 52
End: 2022-10-04

## 2022-10-04 ENCOUNTER — PREP FOR SURGERY (OUTPATIENT)
Dept: OTHER | Facility: HOSPITAL | Age: 52
End: 2022-10-04

## 2022-10-04 VITALS — DIASTOLIC BLOOD PRESSURE: 82 MMHG | SYSTOLIC BLOOD PRESSURE: 136 MMHG | BODY MASS INDEX: 40.37 KG/M2 | WEIGHT: 242.6 LBS

## 2022-10-04 DIAGNOSIS — R93.89 THICKENED ENDOMETRIUM: Primary | ICD-10-CM

## 2022-10-04 DIAGNOSIS — N92.1 MENORRHAGIA WITH IRREGULAR CYCLE: ICD-10-CM

## 2022-10-04 DIAGNOSIS — R93.89 THICKENED ENDOMETRIUM: ICD-10-CM

## 2022-10-04 LAB
ALBUMIN SERPL-MCNC: 4.7 G/DL (ref 3.5–5.2)
ALBUMIN/GLOB SERPL: 1.2 G/DL
ALP SERPL-CCNC: 105 U/L (ref 39–117)
ALT SERPL W P-5'-P-CCNC: 19 U/L (ref 1–33)
ANION GAP SERPL CALCULATED.3IONS-SCNC: 13 MMOL/L (ref 5–15)
AST SERPL-CCNC: 30 U/L (ref 1–32)
BASOPHILS # BLD AUTO: 0.05 10*3/MM3 (ref 0–0.2)
BASOPHILS NFR BLD AUTO: 0.4 % (ref 0–1.5)
BILIRUB SERPL-MCNC: 0.3 MG/DL (ref 0–1.2)
BUN SERPL-MCNC: 11 MG/DL (ref 6–20)
BUN/CREAT SERPL: 13.8 (ref 7–25)
CALCIUM SPEC-SCNC: 9.9 MG/DL (ref 8.6–10.5)
CHLORIDE SERPL-SCNC: 101 MMOL/L (ref 98–107)
CO2 SERPL-SCNC: 25 MMOL/L (ref 22–29)
CREAT SERPL-MCNC: 0.8 MG/DL (ref 0.57–1)
DEPRECATED RDW RBC AUTO: 42 FL (ref 37–54)
EGFRCR SERPLBLD CKD-EPI 2021: 89.3 ML/MIN/1.73
EOSINOPHIL # BLD AUTO: 0.22 10*3/MM3 (ref 0–0.4)
EOSINOPHIL NFR BLD AUTO: 1.8 % (ref 0.3–6.2)
ERYTHROCYTE [DISTWIDTH] IN BLOOD BY AUTOMATED COUNT: 12.8 % (ref 12.3–15.4)
GLOBULIN UR ELPH-MCNC: 3.8 GM/DL
GLUCOSE SERPL-MCNC: 222 MG/DL (ref 65–99)
HBA1C MFR BLD: 10.6 % (ref 4.8–5.6)
HCT VFR BLD AUTO: 35.7 % (ref 34–46.6)
HGB BLD-MCNC: 11.9 G/DL (ref 12–15.9)
IMM GRANULOCYTES # BLD AUTO: 0.08 10*3/MM3 (ref 0–0.05)
IMM GRANULOCYTES NFR BLD AUTO: 0.7 % (ref 0–0.5)
LYMPHOCYTES # BLD AUTO: 3.7 10*3/MM3 (ref 0.7–3.1)
LYMPHOCYTES NFR BLD AUTO: 30.9 % (ref 19.6–45.3)
MCH RBC QN AUTO: 30.1 PG (ref 26.6–33)
MCHC RBC AUTO-ENTMCNC: 33.3 G/DL (ref 31.5–35.7)
MCV RBC AUTO: 90.4 FL (ref 79–97)
MONOCYTES # BLD AUTO: 0.6 10*3/MM3 (ref 0.1–0.9)
MONOCYTES NFR BLD AUTO: 5 % (ref 5–12)
NEUTROPHILS NFR BLD AUTO: 61.2 % (ref 42.7–76)
NEUTROPHILS NFR BLD AUTO: 7.33 10*3/MM3 (ref 1.7–7)
NRBC BLD AUTO-RTO: 0 /100 WBC (ref 0–0.2)
PLATELET # BLD AUTO: 354 10*3/MM3 (ref 140–450)
PMV BLD AUTO: 9.1 FL (ref 6–12)
POTASSIUM SERPL-SCNC: 4.9 MMOL/L (ref 3.5–5.2)
PROT SERPL-MCNC: 8.5 G/DL (ref 6–8.5)
QT INTERVAL: 374 MS
QTC INTERVAL: 426 MS
RBC # BLD AUTO: 3.95 10*6/MM3 (ref 3.77–5.28)
SODIUM SERPL-SCNC: 139 MMOL/L (ref 136–145)
WBC NRBC COR # BLD: 11.98 10*3/MM3 (ref 3.4–10.8)

## 2022-10-04 PROCEDURE — 36415 COLL VENOUS BLD VENIPUNCTURE: CPT

## 2022-10-04 PROCEDURE — 99213 OFFICE O/P EST LOW 20 MIN: CPT | Performed by: OBSTETRICS & GYNECOLOGY

## 2022-10-04 PROCEDURE — 85025 COMPLETE CBC W/AUTO DIFF WBC: CPT

## 2022-10-04 PROCEDURE — 93005 ELECTROCARDIOGRAM TRACING: CPT

## 2022-10-04 PROCEDURE — 80053 COMPREHEN METABOLIC PANEL: CPT

## 2022-10-04 PROCEDURE — 93010 ELECTROCARDIOGRAM REPORT: CPT | Performed by: INTERNAL MEDICINE

## 2022-10-04 PROCEDURE — 83036 HEMOGLOBIN GLYCOSYLATED A1C: CPT

## 2022-10-04 RX ORDER — SODIUM CHLORIDE 0.9 % (FLUSH) 0.9 %
3 SYRINGE (ML) INJECTION EVERY 12 HOURS SCHEDULED
Status: CANCELLED | OUTPATIENT
Start: 2022-10-04

## 2022-10-04 RX ORDER — CEFAZOLIN SODIUM 2 G/100ML
2 INJECTION, SOLUTION INTRAVENOUS ONCE
Status: CANCELLED | OUTPATIENT
Start: 2022-10-04 | End: 2022-10-04

## 2022-10-04 RX ORDER — SCOLOPAMINE TRANSDERMAL SYSTEM 1 MG/1
1 PATCH, EXTENDED RELEASE TRANSDERMAL CONTINUOUS
Status: CANCELLED | OUTPATIENT
Start: 2022-10-04 | End: 2022-10-07

## 2022-10-04 RX ORDER — SODIUM CHLORIDE 0.9 % (FLUSH) 0.9 %
10 SYRINGE (ML) INJECTION AS NEEDED
Status: CANCELLED | OUTPATIENT
Start: 2022-10-04

## 2022-10-04 RX ORDER — ACETAMINOPHEN 500 MG
1000 TABLET ORAL ONCE
Status: CANCELLED | OUTPATIENT
Start: 2022-10-04 | End: 2022-10-04

## 2022-10-04 RX ORDER — GABAPENTIN 300 MG/1
600 CAPSULE ORAL ONCE
Status: CANCELLED | OUTPATIENT
Start: 2022-10-04 | End: 2022-10-04

## 2022-10-04 NOTE — DISCHARGE INSTRUCTIONS
The following information and instructions were given:    Nothing to eat or drink after midnight except sips of water with routine prescribed medication (except blood thinner, certain blood pressure medications, diabetes, or weight reducing medication) unless otherwise instructed by your physician.  Do not eat, drink, smoke or chew gum after midnight the night before surgery. This also includes no mints.    EXCEPTION: [facility]AS patients Patient instructed to drink 20 ounces (or until full) of Gatorade and it needs to be completed 2 hours before given arrival time on the day of surgery. (NO RED Gatorade)    Patient verbalized understanding.    DO NOT shave for two days before your procedure.  Do not wear makeup.      DO NOT wear fingernail polish (gel/regular) and/or acrylic/artificial nails on the day of surgery.   If a patient had recent manicure and would rather not remove polish or artificial nails, then the minimum requirement is that the polish/artificial nails must be removed from the middle finger on each hand.      If patient was having surgery on an upper extremity, then the patient was instructed that fingernail polish/artificial fingernails must be removed for surgery.  NO EXCEPTIONS.      If patient was having surgery on a lower extremity, then the patient was instructed that toenail polish on both extremities must be removed for surgery.  NO EXCEPTIONS.    Remove all jewelry (advised to go to jeweler if unable to remove).  Jewelry especially rings can no longer be taped for surgery.    Leave anything you consider valuable at home.      Bring the following with you (if applicable)   -picture ID and insurance cards   -Co-pay/deductible required by insurance   -Medications in the original bottles (not a list) including all over-the-counter meds     Patient must have a  for transportation home after procedure.  It must be an   adult that will take responsibility for care for 24 hours after surgery.

## 2022-10-04 NOTE — PROGRESS NOTES
Gynecologic Preoperative Exam Note        Subjective   Emily Heller is a 52 y.o. year old  who is scheduled for Hysteroscopy, D&C with myosure, polypectomy at [facility] on 10/06/2022 at 10:30.  Pre Admission testing has been scheduled for 10/04/2022 @ 10:30am at [facility]. Her pre operative diagnosis is Thickened endometrial lining, Menorrhagia and irregular cycle. She does not need to see her PCP for preop clearance for this surgery. No LMP recorded. Patient is perimenopausal.. Her birth control method is vasectomy.  Her BMI is Body mass index is 40.37 kg/m²..      She understands the risks of bleeding, infection, possible damage to other organ systems, including but not limited to the gastrointestinal tract and genitourinary tract.  She also understands the specific risks listed in the preop information (video, pamphlets, etc.).    She has reviewed and signed the preop consent form.    She has been instructed to have a light dinner the night before surgery, then nothing to eat or drink after midnight.  The day of surgery do not chew gum or smoke.  Remove all jewelry, nail polish, contact lenses prior to coming to the hospital.  Do not bring valuables or large sums of money with you. Patient was instructed on what time to arrive and where to check in, maps were given.  She was instructed that she will meet an Anesthesiologist and that an IV will be started to provide fluids and sedation.  The total time of procedure was discussed.  She was instructed that she will need a .      Allergies   Allergen Reactions   • Morphine Anaphylaxis   • Penicillins Anaphylaxis   • Flagyl [Metronidazole] Other (See Comments)     BLISTER IN MOUTH     She has confirmed that she is not allergic to Latex.     She is on the following medications. These were reviewed with the patient today and instructed on which medications are ok to take with a sip of water prior to the surgery.      Current  Outpatient Medications:   •  atorvastatin (LIPITOR) 10 MG tablet, Take 1 tablet by mouth Daily., Disp: 90 tablet, Rfl: 1  •  BIOTIN PO, Take  by mouth., Disp: , Rfl:   •  glucose blood test strip, Check fsbs tid and prn; Accu-Chek Guide Me Strips; E11.9, Disp: 100 each, Rfl: 5  •  lamoTRIgine (LaMICtal) 200 MG tablet, Take 1 tablet by mouth Daily., Disp: 90 tablet, Rfl: 1  •  Lancets (accu-chek multiclix) lancets, Check glucose tid and prn; Accu-Chek Guide Me lancets; E11.9, Disp: 100 each, Rfl: 5  •  lisinopril-hydrochlorothiazide (PRINZIDE,ZESTORETIC) 10-12.5 MG per tablet, Take 1 tablet by mouth Daily., Disp: 90 tablet, Rfl: 1  •  metFORMIN ER (GLUCOPHAGE-XR) 500 MG 24 hr tablet, Take 2 tablets by mouth 2 (Two) Times a Day With Meals., Disp: 360 tablet, Rfl: 1  •  SITagliptin (Januvia) 100 MG tablet, Take 1 tablet by mouth Daily., Disp: 90 tablet, Rfl: 1  •  spironolactone (ALDACTONE) 25 MG tablet, Take 1 tablet by mouth Daily., Disp: 90 tablet, Rfl: 1  •  valACYclovir (VALTREX) 1000 MG tablet, Take 2 tabs PO q12h x 1 day PRN cold sore. Start ASAP after sx onset., Disp: 20 tablet, Rfl: 1  •  vitamin D (ERGOCALCIFEROL) 1.25 MG (17565 UT) capsule capsule, Take 1 capsule by mouth 1 (One) Time Per Week., Disp: 12 capsule, Rfl: 0     Past Medical History:   Diagnosis Date   • ADD (attention deficit disorder)    • Alcoholism (HCC)    • Anxiety    • Arthritis    • Asthma    • Bipolar disorder (HCC)    • Carpal tunnel syndrome    • Depression    • Diabetes (HCC)    • H/O cold sores    • Hyperlipidemia    • Hypertension    • Kidney failure    • Leukocytosis    • Obesity    • PCOS (polycystic ovarian syndrome)      Past Surgical History:   Procedure Laterality Date   • REDUCTION MAMMAPLASTY     • CHOLECYSTECTOMY     • GANGLION CYST EXCISION     • LEEP       OB History    Para Term  AB Living   3 3 3 0 0 0   SAB IAB Ectopic Molar Multiple Live Births   0 0 0 0 0 0      # Outcome Date GA Lbr Twin/2nd Weight  Sex Delivery Anes PTL Lv   3 Term            2 Term            1 Term              Social History     Tobacco Use   Smoking Status Former Smoker   • Types: Cigarettes   • Quit date:    • Years since quittin.7   Smokeless Tobacco Never Used     Social History     Substance and Sexual Activity   Alcohol Use Yes    Comment: occasionally     Social History     Substance and Sexual Activity   Drug Use Never         Review of Systems   Constitutional: Negative.    HENT: Negative.    Eyes: Negative.    Respiratory: Negative.    Cardiovascular: Negative.    Gastrointestinal: Negative.    Endocrine: Negative.    Genitourinary: Negative.    Musculoskeletal: Negative.    Skin: Negative.    Allergic/Immunologic: Negative.    Neurological: Negative.    Hematological: Negative.    Psychiatric/Behavioral: Negative.            Objective    Vitals:    10/04/22 1033   BP: 136/82         Physical Exam  Vitals reviewed.   Constitutional:       Appearance: Normal appearance.   HENT:      Head: Normocephalic and atraumatic.   Cardiovascular:      Rate and Rhythm: Normal rate and regular rhythm.   Pulmonary:      Effort: Pulmonary effort is normal.      Breath sounds: Normal breath sounds.   Abdominal:      General: Abdomen is flat. Bowel sounds are normal.      Palpations: Abdomen is soft.   Skin:     General: Skin is warm and dry.   Neurological:      Mental Status: She is alert and oriented to person, place, and time.   Psychiatric:         Mood and Affect: Mood normal.         Behavior: Behavior normal.         Assessment   Problem List Items Addressed This Visit        Genitourinary and Reproductive     Thickened endometrium - Primary    Menorrhagia with irregular cycle                   Plan   1. Will proceed with dx hysteroscopy, dilation and curettage, endometrial polypectomy with Myosure.   2. Risks of surgery were reviewed with the patient including risks of bleeding, infection, damage to other organ systems including,  but not limited to GI and  tracts (bowel, bladder, blood vessels, nerves) risks of Anesthesia, as well as the risk the surgery will not produce the desired results, possible need for additional surgery, death, risk of uterine perforation.  3. PAT Scheduled    4. Suhail has been obtained and reviewed   5. Pain Medication Consent Form has been signed.  A review regarding proper medication administration, impact on driving and working while medicated, the safety of use in pregnancy, the potential for overdose and the proper disposal and storage of controlled medications has been done with the patient.          Amish Leavitt MD  10/4/2022

## 2022-10-04 NOTE — PAT
An arrival time for procedure was not provided during PAT visit. If patient had any questions or concerns about their arrival time, they were instructed to contact their surgeon/physician.  Additionally, if the patient referred to an arrival time that was acquired from their my chart account, patient was encouraged to verify that time with their surgeon/physician. Arrival times are NOT provided in Pre Admission Testing Department.

## 2022-10-06 ENCOUNTER — OUTSIDE FACILITY SERVICE (OUTPATIENT)
Dept: OBSTETRICS AND GYNECOLOGY | Facility: CLINIC | Age: 52
End: 2022-10-06

## 2022-10-06 ENCOUNTER — LAB REQUISITION (OUTPATIENT)
Dept: LAB | Facility: HOSPITAL | Age: 52
End: 2022-10-06

## 2022-10-06 DIAGNOSIS — N92.1 EXCESSIVE AND FREQUENT MENSTRUATION WITH IRREGULAR CYCLE: ICD-10-CM

## 2022-10-06 PROCEDURE — 58670 LAPAROSCOPY TUBAL CAUTERY: CPT | Performed by: OBSTETRICS & GYNECOLOGY

## 2022-10-06 PROCEDURE — 88305 TISSUE EXAM BY PATHOLOGIST: CPT | Performed by: OBSTETRICS & GYNECOLOGY

## 2022-10-06 RX ORDER — IBUPROFEN 800 MG/1
800 TABLET ORAL EVERY 8 HOURS PRN
Qty: 30 TABLET | Refills: 1 | Status: SHIPPED | OUTPATIENT
Start: 2022-10-06 | End: 2022-10-14

## 2022-10-06 RX ORDER — PROMETHAZINE HYDROCHLORIDE 12.5 MG/1
12.5 TABLET ORAL EVERY 6 HOURS PRN
Qty: 20 TABLET | Refills: 0 | Status: SHIPPED | OUTPATIENT
Start: 2022-10-06 | End: 2022-10-14

## 2022-10-10 LAB
CYTO UR: NORMAL
LAB AP CASE REPORT: NORMAL
LAB AP CLINICAL INFORMATION: NORMAL
PATH REPORT.FINAL DX SPEC: NORMAL
PATH REPORT.GROSS SPEC: NORMAL

## 2022-10-14 ENCOUNTER — OFFICE VISIT (OUTPATIENT)
Dept: FAMILY MEDICINE CLINIC | Facility: CLINIC | Age: 52
End: 2022-10-14

## 2022-10-14 ENCOUNTER — OFFICE VISIT (OUTPATIENT)
Dept: OBSTETRICS AND GYNECOLOGY | Facility: CLINIC | Age: 52
End: 2022-10-14

## 2022-10-14 VITALS
HEIGHT: 65 IN | BODY MASS INDEX: 40.29 KG/M2 | DIASTOLIC BLOOD PRESSURE: 78 MMHG | WEIGHT: 241.8 LBS | SYSTOLIC BLOOD PRESSURE: 118 MMHG

## 2022-10-14 VITALS
OXYGEN SATURATION: 97 % | WEIGHT: 242 LBS | SYSTOLIC BLOOD PRESSURE: 136 MMHG | HEIGHT: 65 IN | DIASTOLIC BLOOD PRESSURE: 74 MMHG | BODY MASS INDEX: 40.32 KG/M2 | HEART RATE: 93 BPM

## 2022-10-14 DIAGNOSIS — D72.829 LEUKOCYTOSIS, UNSPECIFIED TYPE: ICD-10-CM

## 2022-10-14 DIAGNOSIS — E11.65 TYPE 2 DIABETES MELLITUS WITH HYPERGLYCEMIA, WITHOUT LONG-TERM CURRENT USE OF INSULIN: Primary | ICD-10-CM

## 2022-10-14 DIAGNOSIS — Z09 POSTOPERATIVE FOLLOW-UP: Primary | ICD-10-CM

## 2022-10-14 PROCEDURE — 99024 POSTOP FOLLOW-UP VISIT: CPT | Performed by: OBSTETRICS & GYNECOLOGY

## 2022-10-14 PROCEDURE — 99214 OFFICE O/P EST MOD 30 MIN: CPT | Performed by: NURSE PRACTITIONER

## 2022-10-14 RX ORDER — PEN NEEDLE, DIABETIC 32 GX 1/6"
NEEDLE, DISPOSABLE MISCELLANEOUS
Qty: 100 EACH | Refills: 1 | Status: SHIPPED | OUTPATIENT
Start: 2022-10-14 | End: 2023-03-29

## 2022-10-14 RX ORDER — BLOOD PRESSURE TEST KIT
KIT MISCELLANEOUS
Qty: 100 EACH | Refills: 2 | Status: SHIPPED | OUTPATIENT
Start: 2022-10-14

## 2022-10-14 NOTE — ASSESSMENT & PLAN NOTE
We discussed all recent lab results.  We will stop Januvia.  Continue with metformin and will add on Toujeo insulin.  Education provided on how to take.  Education provided on signs symptoms of hypoglycemia and what to do if occurs.  Proper diet and exercise plan discussed and encouraged.  Annual eye exams encouraged.  Check feet daily.  Monitor blood sugars twice daily with 1 being fasting.  She will contact me in 3 to 4 days with blood sugar readings so that we can make adjustments to insulin as needed.  Risk of meds discussed understood.  Follow-up 1 month but she will keep in close contact with me over MyChart or the telephone to let me know readings.  Return to clinic or ED with any issues or concerns.

## 2022-10-14 NOTE — PROGRESS NOTES
OBGYN Postoperative Exam Note          Subjective   Chief Complaint   Patient presents with   • Post Op     Emily Heller is a 52 y.o. year old  presenting to be seen for her post-operative visit. She is S/P Diagnostic Hysteroscopy D&C with endometrial polypectomy with myosure and endocervical polypectomy on 10/6/2022 at [facility] for Postmenopausal Vaginal Bleeding. Currently she reports no problems with eating, bowel movements, voiding, or wound drainage and pain is well controlled.    The pathology results from her procedure are in Emily's record and are benign.      OTHER THINGS SHE WANTS TO DISCUSS TODAY:  Nothing else      Current Outpatient Medications:   •  Alcohol Swabs pads, For use to check blood sugars and prior to giving insulin, Disp: 100 each, Rfl: 2  •  atorvastatin (LIPITOR) 10 MG tablet, Take 1 tablet by mouth Daily., Disp: 90 tablet, Rfl: 1  •  BIOTIN PO, Take  by mouth., Disp: , Rfl:   •  glucose blood test strip, Check fsbs tid and prn; Accu-Chek Guide Me Strips; E11.9, Disp: 100 each, Rfl: 5  •  Insulin Glargine, 1 Unit Dial, (TOUJEO) 300 UNIT/ML solution pen-injector injection, Inject 10 Units under the skin into the appropriate area as directed Daily., Disp: 3 mL, Rfl: 2  •  Insulin Pen Needle (NovoFine Plus Pen Needle) 32G X 4 MM misc, For use with once daily toujeo, Disp: 100 each, Rfl: 1  •  lamoTRIgine (LaMICtal) 200 MG tablet, Take 1 tablet by mouth Daily., Disp: 90 tablet, Rfl: 1  •  Lancets (accu-chek multiclix) lancets, Check glucose tid and prn; Accu-Chek Guide Me lancets; E11.9, Disp: 100 each, Rfl: 5  •  lisinopril-hydrochlorothiazide (PRINZIDE,ZESTORETIC) 10-12.5 MG per tablet, Take 1 tablet by mouth Daily., Disp: 90 tablet, Rfl: 1  •  metFORMIN ER (GLUCOPHAGE-XR) 500 MG 24 hr tablet, Take 2 tablets by mouth 2 (Two) Times a Day With Meals., Disp: 360 tablet, Rfl: 1  •  spironolactone (ALDACTONE) 25 MG tablet, Take 1 tablet by mouth Daily., Disp: 90 tablet, Rfl: 1  •  " valACYclovir (VALTREX) 1000 MG tablet, Take 2 tabs PO q12h x 1 day PRN cold sore. Start ASAP after sx onset., Disp: 20 tablet, Rfl: 1     Past Medical History:   Diagnosis Date   • Abnormal Pap smear of cervix 1996   • ADD (attention deficit disorder)    • Alcoholism (HCC)    • Anxiety    • Arthritis    • Asthma    • Bipolar disorder (HCC)    • Cancer (HCC) Nasal cell carcinoma   • Carpal tunnel syndrome    • Depression    • Diabetes (HCC)    • Fibroid 9/2022   • Gestational hypertension 4/1/2001   • H/O cold sores    • Hyperlipidemia    • Hypertension    • Kidney failure    • Leukocytosis    • Migraine 1995   • Obesity    • PCOS (polycystic ovarian syndrome)    • Urinary tract infection         Past Surgical History:   Procedure Laterality Date   • CERVICAL BIOPSY  W/ LOOP ELECTRODE EXCISION  1996   • CHOLECYSTECTOMY     • DILATATION AND CURETTAGE  10/6/22   • GANGLION CYST EXCISION     • LEEP     • REDUCTION MAMMAPLASTY  2003   • WISDOM TOOTH EXTRACTION  1986       The following portions of the patient's history were reviewed and updated as appropriate:current medications and allergies    Review of Systems   Constitutional: Negative.    Respiratory: Negative.    Cardiovascular: Negative.    Gastrointestinal: Negative.    Genitourinary: Negative.    Musculoskeletal: Negative.    Neurological: Negative.    Psychiatric/Behavioral: Negative.           Objective   /78   Ht 165.1 cm (65\")   Wt 110 kg (241 lb 12.8 oz)   BMI 40.24 kg/m²     Physical Exam  Vitals reviewed.   Constitutional:       Appearance: Normal appearance. She is normal weight.   HENT:      Head: Normocephalic and atraumatic.   Abdominal:      General: Abdomen is flat. Bowel sounds are normal.      Palpations: Abdomen is soft.   Skin:     General: Skin is warm and dry.   Neurological:      Mental Status: She is alert and oriented to person, place, and time.   Psychiatric:         Mood and Affect: Mood normal.         Behavior: Behavior " normal.              Assessment   1. S/P hysteroscopy with Myosure     Plan   1. OK to drive  2. OK to lift  3. May return to full activity with no restrictions  4. The importance of keeping all planned follow-up and taking all medications as prescribed was emphasized.  5. Return in about 3 months (around 1/14/2023) for reevaluation of Menorrhagia.              Amish Leavitt MD  10/14/2022

## 2022-10-14 NOTE — PROGRESS NOTES
"Chief Complaint  Diabetes (Pt is here to discuss lab work today. )    Subjective          Emily Heller presents to [facility]  History of Present Illness     Here to discuss recent lab results.  Diabetes is still uncontrolled with her A1c being 10.6.  She is agreeable to starting insulin so we had that discussion today.  Doing well today with no issues or concerns.  No dizziness no headache no chest pain no chest pressure no shortness of breath no trouble breathing no urinary or bowel issues.  States her eye exams are up-to-date.  States her feet are fine with no cuts or sores.  She states she is really going to better her diet and states she does not exercise much but will plan to sit 20 to 30 minutes a side a day to go walking.    Objective   Vital Signs:   /74   Pulse 93   Ht 165.1 cm (65\")   Wt 110 kg (242 lb)   SpO2 97%   BMI 40.27 kg/m²     Body mass index is 40.27 kg/m².    Review of Systems   Constitutional: Negative for chills, fatigue and fever.   Eyes: Negative for visual disturbance.   Respiratory: Negative for cough, shortness of breath and wheezing.    Cardiovascular: Negative for chest pain and palpitations.   Endocrine: Negative for polydipsia and polyphagia.   Genitourinary: Negative for decreased urine volume, dysuria, frequency, hematuria and urgency.   Neurological: Negative for dizziness and headache.   Psychiatric/Behavioral: Negative for suicidal ideas.       Past History:  Medical History: has a past medical history of ADD (attention deficit disorder), Alcoholism ([facility]), Anxiety, Arthritis, Asthma, Bipolar disorder ([facility]), Carpal tunnel syndrome, Depression, Diabetes ([facility]), H/O cold sores, Hyperlipidemia, Hypertension, Kidney failure, Leukocytosis, Obesity, and PCOS (polycystic ovarian syndrome).   Surgical History: has a past surgical history that includes Reduction mammaplasty (2003); Cholecystectomy; Ganglion cyst excision; and LEEP.   Family " History: family history includes Breast cancer in her paternal aunt; Breast cancer (age of onset: 50) in her paternal aunt; Colon cancer in her maternal uncle; Depression in her mother; Hyperlipidemia in her father and mother; Hypertension in her father; Lung cancer in her mother.   Social History: reports that she quit smoking about 19 years ago. Her smoking use included cigarettes. She started smoking about 34 years ago. She has a 5.00 pack-year smoking history. She has never used smokeless tobacco. She reports current alcohol use. She reports that she does not use drugs.    PHQ-2 Depression Screening  Little interest or pleasure in doing things? 0-->not at all   Feeling down, depressed, or hopeless? 0-->not at all   PHQ-2 Total Score 0        PHQ-9 Depression Screening  Little interest or pleasure in doing things? 0-->not at all   Feeling down, depressed, or hopeless? 0-->not at all   Trouble falling or staying asleep, or sleeping too much?     Feeling tired or having little energy?     Poor appetite or overeating?     Feeling bad about yourself - or that you are a failure or have let yourself or your family down?     Trouble concentrating on things, such as reading the newspaper or watching television?     Moving or speaking so slowly that other people could have noticed? Or the opposite - being so fidgety or restless that you have been moving around a lot more than usual?     Thoughts that you would be better off dead, or of hurting yourself in some way?     PHQ-9 Total Score 0   If you checked off any problems, how difficult have these problems made it for you to do your work, take care of things at home, or get along with other people?       PHQ-9 Total Score: 0      Patient screened positive for depression based on a PHQ-9 score of 0 on 10/14/2022. Follow-up recommendations include:       Current Outpatient Medications:   •  atorvastatin (LIPITOR) 10 MG tablet, Take 1 tablet by mouth Daily., Disp: 90 tablet,  Rfl: 1  •  BIOTIN PO, Take  by mouth., Disp: , Rfl:   •  glucose blood test strip, Check fsbs tid and prn; Accu-Chek Guide Me Strips; E11.9, Disp: 100 each, Rfl: 5  •  lamoTRIgine (LaMICtal) 200 MG tablet, Take 1 tablet by mouth Daily., Disp: 90 tablet, Rfl: 1  •  Lancets (accu-chek multiclix) lancets, Check glucose tid and prn; Accu-Chek Guide Me lancets; E11.9, Disp: 100 each, Rfl: 5  •  lisinopril-hydrochlorothiazide (PRINZIDE,ZESTORETIC) 10-12.5 MG per tablet, Take 1 tablet by mouth Daily., Disp: 90 tablet, Rfl: 1  •  metFORMIN ER (GLUCOPHAGE-XR) 500 MG 24 hr tablet, Take 2 tablets by mouth 2 (Two) Times a Day With Meals., Disp: 360 tablet, Rfl: 1  •  spironolactone (ALDACTONE) 25 MG tablet, Take 1 tablet by mouth Daily., Disp: 90 tablet, Rfl: 1  •  valACYclovir (VALTREX) 1000 MG tablet, Take 2 tabs PO q12h x 1 day PRN cold sore. Start ASAP after sx onset., Disp: 20 tablet, Rfl: 1  •  Alcohol Swabs pads, For use to check blood sugars and prior to giving insulin, Disp: 100 each, Rfl: 2  •  Insulin Glargine, 1 Unit Dial, (TOUJEO) 300 UNIT/ML solution pen-injector injection, Inject 10 Units under the skin into the appropriate area as directed Daily., Disp: 3 mL, Rfl: 2  •  Insulin Pen Needle (NovoFine Plus Pen Needle) 32G X 4 MM misc, For use with once daily toujeo, Disp: 100 each, Rfl: 1   (Not in a hospital admission)     Allergies: Morphine, Penicillins, and Flagyl [metronidazole]    Physical Exam  Constitutional:       Appearance: Normal appearance.   Eyes:      Conjunctiva/sclera: Conjunctivae normal.      Pupils: Pupils are equal, round, and reactive to light.   Cardiovascular:      Rate and Rhythm: Normal rate and regular rhythm.      Heart sounds: Normal heart sounds.   Pulmonary:      Effort: Pulmonary effort is normal.      Breath sounds: Normal breath sounds.   Neurological:      General: No focal deficit present.      Mental Status: She is alert and oriented to person, place, and time. Mental status is  at baseline.   Psychiatric:         Mood and Affect: Mood normal.         Behavior: Behavior normal.         Thought Content: Thought content normal.         Judgment: Judgment normal.          Result Review :                   Assessment and Plan    Diagnoses and all orders for this visit:    1. Type 2 diabetes mellitus with hyperglycemia, without long-term current use of insulin ([facility]) (Primary)  Assessment & Plan:  We discussed all recent lab results.  We will stop Januvia.  Continue with metformin and will add on Toujeo insulin.  Education provided on how to take.  Education provided on signs symptoms of hypoglycemia and what to do if occurs.  Proper diet and exercise plan discussed and encouraged.  Annual eye exams encouraged.  Check feet daily.  Monitor blood sugars twice daily with 1 being fasting.  She will contact me in 3 to 4 days with blood sugar readings so that we can make adjustments to insulin as needed.  Risk of meds discussed understood.  Follow-up 1 month but she will keep in close contact with me over [employer] or the telephone to let me know readings.  Return to clinic or ED with any issues or concerns.    Orders:  -     Insulin Glargine, 1 Unit Dial, (TOUJEO) 300 UNIT/ML solution pen-injector injection; Inject 10 Units under the skin into the appropriate area as directed Daily.  Dispense: 3 mL; Refill: 2  -     Insulin Pen Needle (NovoFine Plus Pen Needle) 32G X 4 MM misc; For use with once daily toujeo  Dispense: 100 each; Refill: 1  -     Alcohol Swabs pads; For use to check blood sugars and prior to giving insulin  Dispense: 100 each; Refill: 2    2. Leukocytosis, unspecified type  Assessment & Plan:  We discussed all recent lab results.  White blood cell count has remained elevated she states she has seen Dr. Hogue in the past and he told her it is due to chronic inflammation and that she only needs to follow-up with him if it worsens.  She just wants to continue to monitor and states we can  recheck in 3 months.  Education provided.  Return to clinic or ED with any issues or concerns.              Class 3 Severe Obesity (BMI >=40). Obesity-related health conditions include the following: hypertension, diabetes mellitus and dyslipidemias. Obesity is improving with treatment. BMI is is above average; BMI management plan is completed. We discussed portion control and increasing exercise.       Follow Up   Return in about 4 weeks (around 11/11/2022).  Patient was given instructions and counseling regarding her condition or for health maintenance advice. Please see specific information pulled into the AVS if appropriate.     VALENCIA Mcghee

## 2022-10-14 NOTE — ASSESSMENT & PLAN NOTE
We discussed all recent lab results.  White blood cell count has remained elevated she states she has seen Dr. Hogue in the past and he told her it is due to chronic inflammation and that she only needs to follow-up with him if it worsens.  She just wants to continue to monitor and states we can recheck in 3 months.  Education provided.  Return to clinic or ED with any issues or concerns.

## 2022-10-21 ENCOUNTER — PATIENT MESSAGE (OUTPATIENT)
Dept: FAMILY MEDICINE CLINIC | Facility: CLINIC | Age: 52
End: 2022-10-21

## 2022-10-24 DIAGNOSIS — E11.65 TYPE 2 DIABETES MELLITUS WITH HYPERGLYCEMIA, WITHOUT LONG-TERM CURRENT USE OF INSULIN: ICD-10-CM

## 2022-11-22 RX ORDER — SODIUM, POTASSIUM,MAG SULFATES 17.5-3.13G
SOLUTION, RECONSTITUTED, ORAL ORAL
Qty: 354 ML | Refills: 0 | Status: SHIPPED | OUTPATIENT
Start: 2022-11-22

## 2022-12-02 ENCOUNTER — OUTSIDE FACILITY SERVICE (OUTPATIENT)
Dept: GASTROENTEROLOGY | Facility: CLINIC | Age: 52
End: 2022-12-02

## 2022-12-02 PROCEDURE — 45385 COLONOSCOPY W/LESION REMOVAL: CPT | Performed by: INTERNAL MEDICINE

## 2022-12-02 PROCEDURE — 88305 TISSUE EXAM BY PATHOLOGIST: CPT

## 2022-12-05 ENCOUNTER — LAB REQUISITION (OUTPATIENT)
Dept: LAB | Facility: HOSPITAL | Age: 52
End: 2022-12-05
Payer: COMMERCIAL

## 2022-12-05 DIAGNOSIS — Z12.11 ENCOUNTER FOR SCREENING FOR MALIGNANT NEOPLASM OF COLON: ICD-10-CM

## 2022-12-05 DIAGNOSIS — D12.3 BENIGN NEOPLASM OF TRANSVERSE COLON: ICD-10-CM

## 2022-12-05 DIAGNOSIS — D12.5 BENIGN NEOPLASM OF SIGMOID COLON: ICD-10-CM

## 2022-12-06 LAB — REF LAB TEST METHOD: NORMAL

## 2022-12-21 ENCOUNTER — PATIENT MESSAGE (OUTPATIENT)
Dept: FAMILY MEDICINE CLINIC | Facility: CLINIC | Age: 52
End: 2022-12-21

## 2022-12-21 ENCOUNTER — TELEPHONE (OUTPATIENT)
Dept: FAMILY MEDICINE CLINIC | Facility: CLINIC | Age: 52
End: 2022-12-21

## 2022-12-21 RX ORDER — PROCHLORPERAZINE 25 MG/1
SUPPOSITORY RECTAL
Qty: 3 EACH | Refills: 1 | Status: SHIPPED | OUTPATIENT
Start: 2022-12-21 | End: 2023-03-06

## 2022-12-21 RX ORDER — PROCHLORPERAZINE 25 MG/1
1 SUPPOSITORY RECTAL 3 TIMES DAILY
Qty: 1 EACH | Refills: 0 | Status: SHIPPED | OUTPATIENT
Start: 2022-12-21 | End: 2023-03-31

## 2022-12-21 NOTE — TELEPHONE ENCOUNTER
See below. Please let pt. Know that I sent in the dexcom sensor (she is asking if someone can run a PA on it). Blood sugars too high so I would recommend an appointment so we can discuss. Thanks

## 2022-12-21 NOTE — TELEPHONE ENCOUNTER
From: Emily Heller  To: Rubens Esparzason  Sent: 12/21/2022 11:19 AM EST  Subject: Blood Sugar    It's been a bit since I've checked in. I have upped myself to 18 units of the Toujeo, and my sugar is still high. This morning's figure was 330.     Would you be willing to prescribe one of the continuously monitoring blood sugar devices? If so, I have no doubt that my insurance will want a pre-authorization on that. If you will prescribe, it please have your admin person take care of the pre-authorization. If you think I should schedule an appointment to come in person, please advise. Thank you and Rubi Hendricks!

## 2022-12-21 NOTE — TELEPHONE ENCOUNTER
----- Message from Vilma Barnard MA sent at 12/21/2022 11:21 AM EST -----  Regarding: FW: Blood Sugar  Contact: [phone]    ----- Message -----  From: Emily Heller  Sent: 12/21/2022  11:19 AM EST  To: Eduardo Ham [facility]  Subject: Blood Sugar                                      It's been a  bit since I've checked in. I have upped myself to 18 units of the Toujeo, and my sugar is still high. This morning's figure was 330.     Would you be willing to prescribe one of the continuously monitoring blood sugar devices? If so, I have no doubt that my insurance will want a pre-authorization on that. If you will prescribe, it please have your admin person take care of the pre-authorization. If you think I should schedule an appointment to come in person, please advise. Thank you and Rubi Hendricks!

## 2023-01-06 ENCOUNTER — OFFICE VISIT (OUTPATIENT)
Dept: FAMILY MEDICINE CLINIC | Facility: CLINIC | Age: 53
End: 2023-01-06
Payer: COMMERCIAL

## 2023-01-06 VITALS
OXYGEN SATURATION: 98 % | HEIGHT: 65 IN | DIASTOLIC BLOOD PRESSURE: 78 MMHG | HEART RATE: 76 BPM | SYSTOLIC BLOOD PRESSURE: 126 MMHG | WEIGHT: 241 LBS | BODY MASS INDEX: 40.15 KG/M2

## 2023-01-06 DIAGNOSIS — Z79.899 ENCOUNTER FOR LONG-TERM (CURRENT) USE OF OTHER MEDICATIONS: ICD-10-CM

## 2023-01-06 DIAGNOSIS — Z79.4 TYPE 2 DIABETES MELLITUS WITH HYPERGLYCEMIA, WITH LONG-TERM CURRENT USE OF INSULIN: Primary | ICD-10-CM

## 2023-01-06 DIAGNOSIS — E11.65 TYPE 2 DIABETES MELLITUS WITH HYPERGLYCEMIA, WITH LONG-TERM CURRENT USE OF INSULIN: Primary | ICD-10-CM

## 2023-01-06 DIAGNOSIS — E56.9 VITAMIN DEFICIENCY: ICD-10-CM

## 2023-01-06 PROCEDURE — 36415 COLL VENOUS BLD VENIPUNCTURE: CPT | Performed by: NURSE PRACTITIONER

## 2023-01-06 PROCEDURE — 99214 OFFICE O/P EST MOD 30 MIN: CPT | Performed by: NURSE PRACTITIONER

## 2023-01-06 RX ORDER — METFORMIN HYDROCHLORIDE 500 MG/1
1000 TABLET, EXTENDED RELEASE ORAL 2 TIMES DAILY WITH MEALS
Qty: 360 TABLET | Refills: 1 | Status: SHIPPED | OUTPATIENT
Start: 2023-01-06

## 2023-01-06 NOTE — PROGRESS NOTES
Chief Complaint  Diabetes    Subjective          Emily Heller presents to [facility]  History of Present Illness     Patient presents for recheck of her diabetes.  She states that she has just picked up the Dexcom today so she is going to start checking her blood sugars a lot closer.  Currently she is taking metformin and 20 units Toujeo daily.  She states blood sugars have been running 200s to 50.  She states the last time she increased her dosage was over a week ago.  She states her eye exam is up-to-date and she states her feet are fine with no cuts or sores.  She knows that her diet has not been the best so with the new year she is going to try and eat better and exercise more.  States she is doing well today.  No dizziness no headache no chest pain no chest pressure no shortness of breath no trouble breathing no urinary or bowel issues.    Objective   Vital Signs:   /78   Pulse 76   Ht 165.1 cm (65\")   Wt 109 kg (241 lb)   SpO2 98%   BMI 40.10 kg/m²     Body mass index is 40.1 kg/m².    Review of Systems   Constitutional: Negative for chills, fatigue and fever.   Eyes: Negative for visual disturbance.   Respiratory: Negative for cough, shortness of breath and wheezing.    Cardiovascular: Negative for chest pain and palpitations.   Endocrine: Negative for polydipsia, polyphagia and polyuria.   Genitourinary: Negative for decreased urine volume, dysuria, frequency, hematuria and urgency.   Musculoskeletal: Negative for back pain.   Skin: Negative for skin lesions and wound.   Neurological: Negative for headache.   Psychiatric/Behavioral: Negative for suicidal ideas.       Past History:  Medical History: has a past medical history of Abnormal Pap smear of cervix (1996), ADD (attention deficit disorder), Alcoholism (HCC), Anxiety, Arthritis, Asthma, Bipolar disorder ([facility]), Cancer ([facility]) (Nasal cell carcinoma), Carpal tunnel syndrome, Depression, Diabetes ([facility]),  Fibroid (9/2022), Gestational hypertension (4/1/2001), H/O cold sores, Hyperlipidemia, Hypertension, Kidney failure, Leukocytosis, Migraine (1995), Obesity, PCOS (polycystic ovarian syndrome), and Urinary tract infection.   Surgical History: has a past surgical history that includes Reduction mammaplasty (2003); Cholecystectomy; Ganglion cyst excision; LEEP; Dilation and curettage of uterus (10/6/22); Cervical biopsy w/ loop electrode excision (1996); and [place] tooth extraction (1986).   Family History: family history includes Breast cancer in her paternal aunt and paternal aunt; Colon cancer in her maternal uncle; Depression in her mother; Hyperlipidemia in her father and mother; Hypertension in her father; Lung cancer in her mother.   Social History: reports that she quit smoking about 21 years ago. Her smoking use included cigarettes. She started smoking about 35 years ago. She has a 15.00 pack-year smoking history. She has never used smokeless tobacco. She reports current alcohol use. She reports that she does not use drugs.    PHQ-2 Depression Screening  Little interest or pleasure in doing things?     Feeling down, depressed, or hopeless?     PHQ-2 Total Score          PHQ-9 Depression Screening  Little interest or pleasure in doing things?     Feeling down, depressed, or hopeless?     Trouble falling or staying asleep, or sleeping too much?     Feeling tired or having little energy?     Poor appetite or overeating?     Feeling bad about yourself - or that you are a failure or have let yourself or your family down?     Trouble concentrating on things, such as reading the newspaper or watching television?     Moving or speaking so slowly that other people could have noticed? Or the opposite - being so fidgety or restless that you have been moving around a lot more than usual?     Thoughts that you would be better off dead, or of hurting yourself in some way?     PHQ-9 Total Score     If you checked off any  problems, how difficult have these problems made it for you to do your work, take care of things at home, or get along with other people?       PHQ-9 Total Score:        Patient screened positive for depression based on a PHQ-9 score of 0 on 10/14/2022. Follow-up recommendations include:          Current Outpatient Medications:   •  atorvastatin (LIPITOR) 10 MG tablet, Take 1 tablet by mouth Daily., Disp: 90 tablet, Rfl: 1  •  Insulin Glargine, 2 Unit Dial, (TOUJEO) 300 UNIT/ML solution pen-injector injection, Inject 22 Units under the skin into the appropriate area as directed Daily., Disp: 9 mL, Rfl: 1  •  lamoTRIgine (LaMICtal) 200 MG tablet, Take 1 tablet by mouth Daily., Disp: 90 tablet, Rfl: 1  •  lisinopril-hydrochlorothiazide (PRINZIDE,ZESTORETIC) 10-12.5 MG per tablet, Take 1 tablet by mouth Daily., Disp: 90 tablet, Rfl: 1  •  metFORMIN ER (GLUCOPHAGE-XR) 500 MG 24 hr tablet, Take 2 tablets by mouth 2 (Two) Times a Day With Meals., Disp: 360 tablet, Rfl: 1  •  spironolactone (ALDACTONE) 25 MG tablet, Take 1 tablet by mouth Daily., Disp: 90 tablet, Rfl: 1  •  valACYclovir (VALTREX) 1000 MG tablet, Take 2 tabs PO q12h x 1 day PRN cold sore. Start ASAP after sx onset., Disp: 20 tablet, Rfl: 1  •  Alcohol Swabs pads, For use to check blood sugars and prior to giving insulin, Disp: 100 each, Rfl: 2  •  BIOTIN PO, Take  by mouth., Disp: , Rfl:   •  Continuous Blood Gluc Sensor (Dexcom G6 Sensor), Every 10 (Ten) Days., Disp: 3 each, Rfl: 1  •  Continuous Blood Gluc Transmit (Dexcom G6 Transmitter) misc, 1 kit 3 (Three) Times a Day., Disp: 1 each, Rfl: 0  •  glucose blood test strip, Check fsbs tid and prn; Accu-Chek Guide Me Strips; E11.9, Disp: 100 each, Rfl: 5  •  Insulin Pen Needle (NovoFine Plus Pen Needle) 32G X 4 MM misc, For use with once daily toujeo, Disp: 100 each, Rfl: 1  •  Lancets (accu-chek multiclix) lancets, Check glucose tid and prn; Accu-Chek Guide Me lancets; E11.9, Disp: 100 each, Rfl: 5  •   sodium-potassium-magnesium sulfates (Suprep Bowel Prep Kit) 17.5-3.13-1.6 GM/177ML solution oral solution, Use as directed by provider for colonoscopy prep, Disp: 354 mL, Rfl: 0   (Not in a hospital admission)     Allergies: Morphine, Penicillins, and Flagyl [metronidazole]    Physical Exam  Constitutional:       Appearance: Normal appearance.   Eyes:      Conjunctiva/sclera: Conjunctivae normal.      Pupils: Pupils are equal, round, and reactive to light.   Cardiovascular:      Rate and Rhythm: Normal rate and regular rhythm.      Heart sounds: Normal heart sounds.   Pulmonary:      Effort: Pulmonary effort is normal.      Breath sounds: Normal breath sounds.   Neurological:      General: No focal deficit present.      Mental Status: She is alert and oriented to person, place, and time. Mental status is at baseline.   Psychiatric:         Mood and Affect: Mood normal.         Behavior: Behavior normal.         Thought Content: Thought content normal.         Judgment: Judgment normal.          Result Review :                   Assessment and Plan    Diagnoses and all orders for this visit:    1. Type 2 diabetes mellitus with hyperglycemia, with long-term current use of insulin ([facility]) (Primary)  Assessment & Plan:  Insulin and metformin refilled.  She will start using Dexcom today.  I informed her that she is to keep in close contact with me and contact me every 3 to 5 days with blood sugar readings so that we can gradually increase her insulin dosage.  Annual eye exams encouraged.  Check feet daily.  Stressed proper diet and exercise plan.  Risk of meds discussed and understood.  Education provided.  Return to clinic or ED with any issues or concerns.    Orders:  -     Insulin Glargine, 2 Unit Dial, (TOUJEO) 300 UNIT/ML solution pen-injector injection; Inject 22 Units under the skin into the appropriate area as directed Daily.  Dispense: 9 mL; Refill: 1  -     Hemoglobin A1c; Future  -     Hemoglobin A1c  -      metFORMIN ER (GLUCOPHAGE-XR) 500 MG 24 hr tablet; Take 2 tablets by mouth 2 (Two) Times a Day With Meals.  Dispense: 360 tablet; Refill: 1    2. Vitamin deficiency  -     Vitamin D,25-Hydroxy; Future  -     Vitamin D,25-Hydroxy    3. Encounter for long-term (current) use of other medications  -     Basic Metabolic Panel; Future  -     Basic Metabolic Panel  -     CBC & Differential; Future            Class 3 Severe Obesity (BMI >=40). Obesity-related health conditions include the following: hypertension, diabetes mellitus and dyslipidemias. Obesity is improving with treatment. BMI is is above average; BMI management plan is completed. We discussed portion control and increasing exercise.       Follow Up   Return in about 3 months (around 4/6/2023).  Patient was given instructions and counseling regarding her condition or for health maintenance advice. Please see specific information pulled into the AVS if appropriate.     VALENCIA Mcghee

## 2023-01-06 NOTE — ASSESSMENT & PLAN NOTE
Insulin and metformin refilled.  She will start using Dexcom today.  I informed her that she is to keep in close contact with me and contact me every 3 to 5 days with blood sugar readings so that we can gradually increase her insulin dosage.  Annual eye exams encouraged.  Check feet daily.  Stressed proper diet and exercise plan.  Risk of meds discussed and understood.  Education provided.  Return to clinic or ED with any issues or concerns.

## 2023-01-07 LAB
25(OH)D3+25(OH)D2 SERPL-MCNC: 28.7 NG/ML (ref 30–100)
BASOPHILS # BLD AUTO: NORMAL 10*3/UL
BUN SERPL-MCNC: 11 MG/DL (ref 6–24)
BUN/CREAT SERPL: 17 (ref 9–23)
CALCIUM SERPL-MCNC: 9 MG/DL (ref 8.7–10.2)
CHLORIDE SERPL-SCNC: 99 MMOL/L (ref 96–106)
CO2 SERPL-SCNC: 24 MMOL/L (ref 20–29)
CREAT SERPL-MCNC: 0.63 MG/DL (ref 0.57–1)
EGFRCR SERPLBLD CKD-EPI 2021: 107 ML/MIN/1.73
EOSINOPHIL # BLD AUTO: NORMAL 10*3/UL
EOSINOPHIL NFR BLD AUTO: NORMAL %
GLUCOSE SERPL-MCNC: 295 MG/DL (ref 70–99)
HBA1C MFR BLD: 12.9 % (ref 4.8–5.6)
HCT VFR BLD AUTO: NORMAL %
HGB BLD-MCNC: NORMAL G/DL
LYMPHOCYTES # BLD AUTO: NORMAL 10*3/UL
LYMPHOCYTES NFR BLD AUTO: NORMAL %
MONOCYTES NFR BLD AUTO: NORMAL %
NEUTROPHILS NFR BLD AUTO: NORMAL %
PLATELET # BLD AUTO: NORMAL 10*3/UL
POTASSIUM SERPL-SCNC: 4.4 MMOL/L (ref 3.5–5.2)
RBC # BLD AUTO: NORMAL 10*6/UL
SODIUM SERPL-SCNC: 136 MMOL/L (ref 134–144)
SPECIMEN STATUS: NORMAL
WBC # BLD AUTO: NORMAL X10E3/UL

## 2023-01-08 LAB
BASOPHILS # BLD AUTO: 0.1 X10E3/UL (ref 0–0.2)
BASOPHILS NFR BLD AUTO: 1 %
EOSINOPHIL # BLD AUTO: 0.2 X10E3/UL (ref 0–0.4)
EOSINOPHIL NFR BLD AUTO: 2 %
ERYTHROCYTE [DISTWIDTH] IN BLOOD BY AUTOMATED COUNT: 12.6 % (ref 11.7–15.4)
HCT VFR BLD AUTO: 35.3 % (ref 34–46.6)
HGB BLD-MCNC: 12.1 G/DL (ref 11.1–15.9)
IMM GRANULOCYTES # BLD AUTO: 0.1 X10E3/UL (ref 0–0.1)
IMM GRANULOCYTES NFR BLD AUTO: 1 %
LYMPHOCYTES # BLD AUTO: 4 X10E3/UL (ref 0.7–3.1)
LYMPHOCYTES NFR BLD AUTO: 30 %
MCH RBC QN AUTO: 29.7 PG (ref 26.6–33)
MCHC RBC AUTO-ENTMCNC: 34.3 G/DL (ref 31.5–35.7)
MCV RBC AUTO: 87 FL (ref 79–97)
MONOCYTES # BLD AUTO: 0.7 X10E3/UL (ref 0.1–0.9)
MONOCYTES NFR BLD AUTO: 5 %
NEUTROPHILS # BLD AUTO: 8.2 X10E3/UL (ref 1.4–7)
NEUTROPHILS NFR BLD AUTO: 61 %
PLATELET # BLD AUTO: 292 X10E3/UL (ref 150–450)
RBC # BLD AUTO: 4.07 X10E6/UL (ref 3.77–5.28)
WBC # BLD AUTO: 13.2 X10E3/UL (ref 3.4–10.8)

## 2023-01-10 ENCOUNTER — TELEPHONE (OUTPATIENT)
Dept: FAMILY MEDICINE CLINIC | Facility: CLINIC | Age: 53
End: 2023-01-10
Payer: COMMERCIAL

## 2023-01-13 ENCOUNTER — OFFICE VISIT (OUTPATIENT)
Dept: FAMILY MEDICINE CLINIC | Facility: CLINIC | Age: 53
End: 2023-01-13
Payer: COMMERCIAL

## 2023-01-13 VITALS
DIASTOLIC BLOOD PRESSURE: 78 MMHG | BODY MASS INDEX: 40.15 KG/M2 | HEIGHT: 65 IN | OXYGEN SATURATION: 98 % | SYSTOLIC BLOOD PRESSURE: 126 MMHG | WEIGHT: 241 LBS | HEART RATE: 89 BPM

## 2023-01-13 DIAGNOSIS — E56.9 VITAMIN DEFICIENCY: ICD-10-CM

## 2023-01-13 DIAGNOSIS — D72.829 LEUKOCYTOSIS, UNSPECIFIED TYPE: ICD-10-CM

## 2023-01-13 DIAGNOSIS — E11.65 TYPE 2 DIABETES MELLITUS WITH HYPERGLYCEMIA, WITHOUT LONG-TERM CURRENT USE OF INSULIN: Primary | ICD-10-CM

## 2023-01-13 PROCEDURE — 99214 OFFICE O/P EST MOD 30 MIN: CPT | Performed by: NURSE PRACTITIONER

## 2023-01-13 RX ORDER — INSULIN ASPART 100 [IU]/ML
INJECTION, SOLUTION INTRAVENOUS; SUBCUTANEOUS
Qty: 6 ML | Refills: 1 | Status: SHIPPED | OUTPATIENT
Start: 2023-01-13

## 2023-01-13 NOTE — ASSESSMENT & PLAN NOTE
She is aware that it has slightly increased.  She has seen hematology in the past and does not want to follow back up with them at this time.  States she will continue to monitor and recheck a CBC in 1 month.  Risk discussed understood.  Education provided.  Return to clinic or ED with any issues or concerns.

## 2023-01-13 NOTE — PROGRESS NOTES
"Chief Complaint  Abnormal Lab    Subjective          Emily Heller presents to [facility]  History of Present Illness     Patient had blood work completed 1 6/23 and glucose was 295 and her A1c has increased to 12.9.  Currently she is on metformin and 22 units of Toujeo.  She has picked up her Dexcom and it is working well for her.  Blood sugars ranging 190 to over 300.  She knows her diet is poor and states she will try to do better.  She does not exercise so she plans on starting to walk more daily.  States her feet are fine with no cuts or sores.    Vitamin D level was low so she will start a supplement.  White blood cell count slightly elevated from what it has been.  She has seen Dr. Hogue in the past regarding this and she states he told her she only needs to follow-up if she has a fairly large increase.  States she feels fine.  No dizziness no headache no chest pain no chest pressure no shortness of breath no trouble breathing no urinary or bowel issues.    Objective   Vital Signs:   /78   Pulse 89   Ht 165.1 cm (65\")   Wt 109 kg (241 lb)   SpO2 98%   BMI 40.10 kg/m²     Body mass index is 40.1 kg/m².    Review of Systems   Constitutional: Negative for fatigue and fever.   HENT: Negative for congestion.    Eyes: Negative for visual disturbance.   Respiratory: Negative for cough, shortness of breath and wheezing.    Cardiovascular: Negative.    Gastrointestinal: Negative for abdominal pain, diarrhea, nausea and vomiting.   Endocrine: Negative for polydipsia, polyphagia and polyuria.   Genitourinary: Negative for dysuria.   Skin: Negative for skin lesions and wound.   Neurological: Negative for headache.       Past History:  Medical History: has a past medical history of Abnormal Pap smear of cervix (1996), ADD (attention deficit disorder), Alcoholism (HCC), Anxiety, Arthritis, Asthma, Bipolar disorder ([facility]), Cancer ([facility]) (Nasal cell carcinoma), Carpal tunnel " syndrome, Depression, Diabetes (HCC), Fibroid (9/2022), Gestational hypertension (4/1/2001), H/O cold sores, Hyperlipidemia, Hypertension, Kidney failure, Leukocytosis, Migraine (1995), Obesity, PCOS (polycystic ovarian syndrome), and Urinary tract infection.   Surgical History: has a past surgical history that includes Reduction mammaplasty (2003); Cholecystectomy; Ganglion cyst excision; LEEP; Dilation and curettage of uterus (10/6/22); Cervical biopsy w/ loop electrode excision (1996); and [place] tooth extraction (1986).   Family History: family history includes Breast cancer in her paternal aunt and paternal aunt; Colon cancer in her maternal uncle; Depression in her mother; Hyperlipidemia in her father and mother; Hypertension in her father; Lung cancer in her mother.   Social History: reports that she quit smoking about 21 years ago. Her smoking use included cigarettes. She started smoking about 35 years ago. She has a 15.00 pack-year smoking history. She has never used smokeless tobacco. She reports current alcohol use. She reports that she does not use drugs.    PHQ-2 Depression Screening  Little interest or pleasure in doing things? 0-->not at all   Feeling down, depressed, or hopeless? 0-->not at all   PHQ-2 Total Score 0        PHQ-9 Depression Screening  Little interest or pleasure in doing things? 0-->not at all   Feeling down, depressed, or hopeless? 0-->not at all   Trouble falling or staying asleep, or sleeping too much?     Feeling tired or having little energy?     Poor appetite or overeating?     Feeling bad about yourself - or that you are a failure or have let yourself or your family down?     Trouble concentrating on things, such as reading the newspaper or watching television?     Moving or speaking so slowly that other people could have noticed? Or the opposite - being so fidgety or restless that you have been moving around a lot more than usual?     Thoughts that you would be better off  dead, or of hurting yourself in some way?     PHQ-9 Total Score 0   If you checked off any problems, how difficult have these problems made it for you to do your work, take care of things at home, or get along with other people?       PHQ-9 Total Score: 0      Patient screened positive for depression based on a PHQ-9 score of 0 on 1/13/2023. Follow-up recommendations include:       Current Outpatient Medications:   •  Alcohol Swabs pads, For use to check blood sugars and prior to giving insulin, Disp: 100 each, Rfl: 2  •  atorvastatin (LIPITOR) 10 MG tablet, Take 1 tablet by mouth Daily., Disp: 90 tablet, Rfl: 1  •  BIOTIN PO, Take  by mouth., Disp: , Rfl:   •  Continuous Blood Gluc Sensor (Dexcom G6 Sensor), Every 10 (Ten) Days., Disp: 3 each, Rfl: 1  •  Continuous Blood Gluc Transmit (Dexcom G6 Transmitter) misc, 1 kit 3 (Three) Times a Day., Disp: 1 each, Rfl: 0  •  glucose blood test strip, Check fsbs tid and prn; Accu-Chek Guide Me Strips; E11.9, Disp: 100 each, Rfl: 5  •  Insulin Glargine, 2 Unit Dial, (TOUJEO) 300 UNIT/ML solution pen-injector injection, Inject 22 Units under the skin into the appropriate area as directed Daily., Disp: 9 mL, Rfl: 1  •  Insulin Pen Needle (NovoFine Plus Pen Needle) 32G X 4 MM misc, For use with once daily toujeo, Disp: 100 each, Rfl: 1  •  lamoTRIgine (LaMICtal) 200 MG tablet, Take 1 tablet by mouth Daily., Disp: 90 tablet, Rfl: 1  •  Lancets (accu-chek multiclix) lancets, Check glucose tid and prn; Accu-Chek Guide Me lancets; E11.9, Disp: 100 each, Rfl: 5  •  lisinopril-hydrochlorothiazide (PRINZIDE,ZESTORETIC) 10-12.5 MG per tablet, Take 1 tablet by mouth Daily., Disp: 90 tablet, Rfl: 1  •  metFORMIN ER (GLUCOPHAGE-XR) 500 MG 24 hr tablet, Take 2 tablets by mouth 2 (Two) Times a Day With Meals., Disp: 360 tablet, Rfl: 1  •  sodium-potassium-magnesium sulfates (Suprep Bowel Prep Kit) 17.5-3.13-1.6 GM/177ML solution oral solution, Use as directed by provider for colonoscopy  prep, Disp: 354 mL, Rfl: 0  •  spironolactone (ALDACTONE) 25 MG tablet, Take 1 tablet by mouth Daily., Disp: 90 tablet, Rfl: 1  •  valACYclovir (VALTREX) 1000 MG tablet, Take 2 tabs PO q12h x 1 day PRN cold sore. Start ASAP after sx onset., Disp: 20 tablet, Rfl: 1  •  insulin aspart (NovoLOG FlexPen) 100 UNIT/ML solution pen-injector sc pen, Can take 3 times daily with meals as needed based on sliding scale instructions for max dose of 8units, Disp: 6 mL, Rfl: 1   (Not in a hospital admission)     Allergies: Morphine, Penicillins, and Flagyl [metronidazole]    Physical Exam  Constitutional:       Appearance: Normal appearance.   Cardiovascular:      Rate and Rhythm: Normal rate and regular rhythm.      Heart sounds: Normal heart sounds.   Pulmonary:      Effort: Pulmonary effort is normal.      Breath sounds: Normal breath sounds.   Neurological:      General: No focal deficit present.      Mental Status: She is alert and oriented to person, place, and time. Mental status is at baseline.   Psychiatric:         Mood and Affect: Mood normal.         Behavior: Behavior normal.         Thought Content: Thought content normal.         Judgment: Judgment normal.          Result Review :                   Assessment and Plan    Diagnoses and all orders for this visit:    1. Type 2 diabetes mellitus with hyperglycemia, without long-term current use of insulin (HCC) (Primary)  Assessment & Plan:  Continue Toujeo.  She will increase Toujeo today to 24 units.  We will start sliding scale NovoLog.  Education provided and she knows how to take the NovoLog only as needed with meals based on blood sugar readings.  She has Dexcom in place to monitor her blood sugars closely.  Stressed proper diet and exercise plan.  Annual eye exams encouraged.  Check feet daily.  Education provided on what to do and episodes of hypoglycemia and also educated on signs symptoms of hypoglycemia.  Follow-up 1 month but she will keep me updated in 1 to  2 weeks how she is doing.  Return to clinic or ED with any issues or concerns.  Risk discussed understood.    Orders:  -     insulin aspart (NovoLOG FlexPen) 100 UNIT/ML solution pen-injector sc pen; Can take 3 times daily with meals as needed based on sliding scale instructions for max dose of 8units  Dispense: 6 mL; Refill: 1    2. Vitamin deficiency  Assessment & Plan:  Discussed recent lab results.  She will start 2000 units daily of an over-the-counter vitamin D supplement.  We can recheck this level in 3 months.      3. Leukocytosis, unspecified type  Assessment & Plan:  She is aware that it has slightly increased.  She has seen hematology in the past and does not want to follow back up with them at this time.  States she will continue to monitor and recheck a CBC in 1 month.  Risk discussed understood.  Education provided.  Return to clinic or ED with any issues or concerns.              Class 3 Severe Obesity (BMI >=40). Obesity-related health conditions include the following: diabetes mellitus. Obesity is worsening. BMI is is above average; BMI management plan is completed. We discussed portion control and increasing exercise.       Follow Up   Return in about 4 weeks (around 2/10/2023).  Patient was given instructions and counseling regarding her condition or for health maintenance advice. Please see specific information pulled into the AVS if appropriate.     VALENCIA Mcghee

## 2023-01-13 NOTE — ASSESSMENT & PLAN NOTE
Discussed recent lab results.  She will start 2000 units daily of an over-the-counter vitamin D supplement.  We can recheck this level in 3 months.

## 2023-01-13 NOTE — ASSESSMENT & PLAN NOTE
Continue Toujeo.  She will increase Toujeo today to 24 units.  We will start sliding scale NovoLog.  Education provided and she knows how to take the NovoLog only as needed with meals based on blood sugar readings.  She has Dexcom in place to monitor her blood sugars closely.  Stressed proper diet and exercise plan.  Annual eye exams encouraged.  Check feet daily.  Education provided on what to do and episodes of hypoglycemia and also educated on signs symptoms of hypoglycemia.  Follow-up 1 month but she will keep me updated in 1 to 2 weeks how she is doing.  Return to clinic or ED with any issues or concerns.  Risk discussed understood.

## 2023-03-06 RX ORDER — PROCHLORPERAZINE 25 MG/1
SUPPOSITORY RECTAL
Qty: 3 EACH | Refills: 1 | Status: SHIPPED | OUTPATIENT
Start: 2023-03-06

## 2023-03-26 DIAGNOSIS — R45.4 ANGER: ICD-10-CM

## 2023-03-26 DIAGNOSIS — F32.9 MAJOR DEPRESSIVE DISORDER WITH CURRENT ACTIVE EPISODE, UNSPECIFIED DEPRESSION EPISODE SEVERITY, UNSPECIFIED WHETHER RECURRENT: ICD-10-CM

## 2023-03-27 RX ORDER — LAMOTRIGINE 200 MG/1
TABLET ORAL
Qty: 90 TABLET | Refills: 1 | Status: SHIPPED | OUTPATIENT
Start: 2023-03-27

## 2023-03-28 DIAGNOSIS — E11.65 TYPE 2 DIABETES MELLITUS WITH HYPERGLYCEMIA, WITHOUT LONG-TERM CURRENT USE OF INSULIN: ICD-10-CM

## 2023-03-28 DIAGNOSIS — E78.2 MIXED HYPERLIPIDEMIA: ICD-10-CM

## 2023-03-29 RX ORDER — ATORVASTATIN CALCIUM 10 MG/1
TABLET, FILM COATED ORAL
Qty: 90 TABLET | Refills: 1 | Status: SHIPPED | OUTPATIENT
Start: 2023-03-29

## 2023-03-29 RX ORDER — PEN NEEDLE, DIABETIC 32 GX 1/4"
NEEDLE, DISPOSABLE MISCELLANEOUS
Qty: 100 EACH | Refills: 1 | Status: SHIPPED | OUTPATIENT
Start: 2023-03-29

## 2023-03-31 RX ORDER — PROCHLORPERAZINE 25 MG/1
SUPPOSITORY RECTAL
Qty: 1 EACH | Refills: 0 | Status: SHIPPED | OUTPATIENT
Start: 2023-03-31

## 2023-06-12 ENCOUNTER — TELEPHONE (OUTPATIENT)
Dept: FAMILY MEDICINE CLINIC | Facility: CLINIC | Age: 53
End: 2023-06-12
Payer: COMMERCIAL

## 2023-06-12 DIAGNOSIS — E11.65 TYPE 2 DIABETES MELLITUS WITH HYPERGLYCEMIA, WITHOUT LONG-TERM CURRENT USE OF INSULIN: ICD-10-CM

## 2023-06-12 DIAGNOSIS — E56.9 VITAMIN DEFICIENCY: ICD-10-CM

## 2023-06-12 DIAGNOSIS — Z79.899 ENCOUNTER FOR LONG-TERM (CURRENT) USE OF OTHER MEDICATIONS: Primary | ICD-10-CM

## 2023-06-12 DIAGNOSIS — I10 BENIGN ESSENTIAL HTN: ICD-10-CM

## 2023-06-12 NOTE — TELEPHONE ENCOUNTER
Caller: Arron Emily Karley    Relationship: Self    Best call back number: [phone]    What orders are you requesting (i.e. lab or imaging): LAB WORK FOR DIABETES CHECK UP     In what timeframe would the patient need to come in: PATIENT IS AVAILABLE 6.19.23 AND 6.20.23 TO HAVE LABS DRAWN PRIOR TO HER APPOINTMENT ON 6.23.23.     Where will you receive your lab/imaging services: [facility]     Additional notes: PLEASE CALL PATIENT BACK TO SCHEDULE, IF NO ANSWER LEAVE A DETAILED MESSAGE.

## 2023-06-14 NOTE — TELEPHONE ENCOUNTER
Called patient to inform her that her labs have been sent in. No answer- VM left for pt to call back.

## 2023-06-14 NOTE — TELEPHONE ENCOUNTER
Caller: Emily Heller    Relationship to patient: Self    Best call back number: [phone]    Patient is needing: PATIENT WAS RETURNING JOSIAS'S CALL. PLEASE CALL PATIENT BACK

## 2023-06-21 LAB
25(OH)D3+25(OH)D2 SERPL-MCNC: 40.8 NG/ML (ref 30–100)
ALBUMIN SERPL-MCNC: 4.4 G/DL (ref 3.8–4.9)
ALBUMIN/GLOB SERPL: 1.6 {RATIO} (ref 1.2–2.2)
ALP SERPL-CCNC: 92 IU/L (ref 44–121)
ALT SERPL-CCNC: 17 IU/L (ref 0–32)
AST SERPL-CCNC: 21 IU/L (ref 0–40)
BASOPHILS # BLD AUTO: 0.1 X10E3/UL (ref 0–0.2)
BASOPHILS NFR BLD AUTO: 1 %
BILIRUB SERPL-MCNC: 0.3 MG/DL (ref 0–1.2)
BUN SERPL-MCNC: 10 MG/DL (ref 6–24)
BUN/CREAT SERPL: 13 (ref 9–23)
CALCIUM SERPL-MCNC: 9.5 MG/DL (ref 8.7–10.2)
CHLORIDE SERPL-SCNC: 96 MMOL/L (ref 96–106)
CHOLEST SERPL-MCNC: 154 MG/DL (ref 100–199)
CO2 SERPL-SCNC: 23 MMOL/L (ref 20–29)
CREAT SERPL-MCNC: 0.76 MG/DL (ref 0.57–1)
EGFRCR SERPLBLD CKD-EPI 2021: 94 ML/MIN/1.73
EOSINOPHIL # BLD AUTO: 0.2 X10E3/UL (ref 0–0.4)
EOSINOPHIL NFR BLD AUTO: 2 %
ERYTHROCYTE [DISTWIDTH] IN BLOOD BY AUTOMATED COUNT: 12.4 % (ref 11.7–15.4)
GLOBULIN SER CALC-MCNC: 2.7 G/DL (ref 1.5–4.5)
GLUCOSE SERPL-MCNC: 257 MG/DL (ref 70–99)
HBA1C MFR BLD: 11.7 % (ref 4.8–5.6)
HCT VFR BLD AUTO: 36.4 % (ref 34–46.6)
HDLC SERPL-MCNC: 52 MG/DL
HGB BLD-MCNC: 12.3 G/DL (ref 11.1–15.9)
IMM GRANULOCYTES # BLD AUTO: 0.1 X10E3/UL (ref 0–0.1)
IMM GRANULOCYTES NFR BLD AUTO: 1 %
LDLC SERPL CALC-MCNC: 79 MG/DL (ref 0–99)
LYMPHOCYTES # BLD AUTO: 3.3 X10E3/UL (ref 0.7–3.1)
LYMPHOCYTES NFR BLD AUTO: 33 %
MCH RBC QN AUTO: 30.4 PG (ref 26.6–33)
MCHC RBC AUTO-ENTMCNC: 33.8 G/DL (ref 31.5–35.7)
MCV RBC AUTO: 90 FL (ref 79–97)
MONOCYTES # BLD AUTO: 0.6 X10E3/UL (ref 0.1–0.9)
MONOCYTES NFR BLD AUTO: 6 %
NEUTROPHILS # BLD AUTO: 5.9 X10E3/UL (ref 1.4–7)
NEUTROPHILS NFR BLD AUTO: 57 %
PLATELET # BLD AUTO: 331 X10E3/UL (ref 150–450)
POTASSIUM SERPL-SCNC: 4.4 MMOL/L (ref 3.5–5.2)
PROT SERPL-MCNC: 7.1 G/DL (ref 6–8.5)
RBC # BLD AUTO: 4.05 X10E6/UL (ref 3.77–5.28)
SODIUM SERPL-SCNC: 136 MMOL/L (ref 134–144)
TRIGL SERPL-MCNC: 132 MG/DL (ref 0–149)
TSH SERPL DL<=0.005 MIU/L-ACNC: 2.05 UIU/ML (ref 0.45–4.5)
VIT B12 SERPL-MCNC: 495 PG/ML (ref 232–1245)
VLDLC SERPL CALC-MCNC: 23 MG/DL (ref 5–40)
WBC # BLD AUTO: 10 X10E3/UL (ref 3.4–10.8)

## 2023-07-06 PROBLEM — R07.9 CHEST PAIN: Status: ACTIVE | Noted: 2023-07-06

## 2023-08-11 ENCOUNTER — TRANSCRIBE ORDERS (OUTPATIENT)
Dept: ADMINISTRATIVE | Facility: HOSPITAL | Age: 53
End: 2023-08-11
Payer: COMMERCIAL

## 2023-08-11 DIAGNOSIS — Z12.31 SCREENING MAMMOGRAM FOR BREAST CANCER: Primary | ICD-10-CM

## 2023-09-13 ENCOUNTER — OFFICE VISIT (OUTPATIENT)
Dept: OBSTETRICS AND GYNECOLOGY | Facility: CLINIC | Age: 53
End: 2023-09-13
Payer: COMMERCIAL

## 2023-09-13 VITALS
DIASTOLIC BLOOD PRESSURE: 86 MMHG | SYSTOLIC BLOOD PRESSURE: 130 MMHG | BODY MASS INDEX: 41.09 KG/M2 | WEIGHT: 246.6 LBS | HEIGHT: 65 IN

## 2023-09-13 DIAGNOSIS — Z87.42 HISTORY OF ABNORMAL CERVICAL PAPANICOLAOU SMEAR: ICD-10-CM

## 2023-09-13 DIAGNOSIS — R14.0 BLOATING: ICD-10-CM

## 2023-09-13 DIAGNOSIS — Z01.419 ENCOUNTER FOR ANNUAL ROUTINE GYNECOLOGICAL EXAMINATION: Primary | ICD-10-CM

## 2023-09-13 DIAGNOSIS — Z79.4 TYPE 2 DIABETES MELLITUS WITH HYPERGLYCEMIA, WITH LONG-TERM CURRENT USE OF INSULIN: ICD-10-CM

## 2023-09-13 DIAGNOSIS — E11.65 TYPE 2 DIABETES MELLITUS WITH HYPERGLYCEMIA, WITH LONG-TERM CURRENT USE OF INSULIN: ICD-10-CM

## 2023-09-13 RX ORDER — INSULIN GLARGINE 300 U/ML
38 INJECTION, SOLUTION SUBCUTANEOUS DAILY
Qty: 6 ML | Refills: 1 | Status: SHIPPED | OUTPATIENT
Start: 2023-09-13

## 2023-09-13 NOTE — PROGRESS NOTES
Gynecologic Annual Exam Note          GYN Annual Exam     Annual Exam        Subjective     HPI  Emily Heller is a 53 y.o. female, , who presents for annual well woman exam as a established patient . LMP: 2023 but is irregular and happens randomly between months time. Patient is perimenopausal..  Patient reports problems with: constipation.   Her periods are irregular . She reports dysmenorrhea is none. Partner Status: Marital Status: . She is is sexually active. She has not had new partners.. STD testing recommendations have been explained to the patient and she does not desire STD testing. There were no changes to her medical or surgical history since her last visit..       Additional OB/GYN History   Current contraception: contraceptive methods: Vasectomy   Desires to: do not start contraception    Last Pap : 2022. Result: negative. HPV: negative.   Last Completed Pap Smear            Ordered - PAP SMEAR (Every 3 Years) Ordered on 2022  LIQUID-BASED PAP SMEAR, [facility] ([facility])    2021  SCANNED - PAP SMEAR    2020  Done - negative                  History of abnormal Pap smear: yes - , results unknown but was followed by LEEP procedure  Family history of uterine, colon, breast, or ovarian cancer: no  Performs monthly Self-Breast Exam: no  Last mammogram: 2022. Done at [facility]. Results were normal per pt and has upcoming annual mammogram this coming friday    Last Completed Mammogram            Scheduled - MAMMOGRAM (Every 2 Years) Scheduled for 9/15/2023      2022  Mammo Screening Digital Tomosynthesis Bilateral With CAD    2020  Mammo Screening Digital Tomosynthesis Bilateral With CAD    2019  Mammo Screening Digital Tomosynthesis Bilateral With CAD    2018  Mammo screening digital tomosynthesis bilateral w CAD    2017  Mammo Screening Digital Tomosynthesis Bilateral With CAD    Only the first 5 history  entries have been loaded, but more history exists.                    History of abnormal mammogram: no    Colonoscopy: has had a colonoscopy 1 year(s) ago. Results were normal with benign polyps.  Exercises Regularly: no  Feelings of Anxiety or Depression: no  Tobacco Usage?: No       Current Outpatient Medications:     Alcohol Swabs pads, For use to check blood sugars and prior to giving insulin, Disp: 100 each, Rfl: 2    atorvastatin (LIPITOR) 10 MG tablet, Take 1 tablet by mouth Daily., Disp: 90 tablet, Rfl: 1    BIOTIN PO, Take  by mouth., Disp: , Rfl:     Continuous Blood Gluc Sensor (Dexcom G6 Sensor), Use 1 sensor every 10 days, Disp: 3 each, Rfl: 1    Continuous Blood Gluc Transmit (Dexcom G6 Transmitter) misc, Inject 1 kit under the skin into the appropriate area as directed 3 (Three) Times a Day., Disp: 1 each, Rfl: 0    glucose blood test strip, Check fsbs tid and prn; Accu-Chek Guide Me Strips; E11.9, Disp: 100 each, Rfl: 5    insulin aspart (NovoLOG FlexPen) 100 UNIT/ML solution pen-injector sc pen, Can take 3 times daily with meals as needed based on sliding scale instructions for max dose of 8units, Disp: 6 mL, Rfl: 1    Insulin Glargine, 2 Unit Dial, (Toujeo Max SoloStar) 300 UNIT/ML solution pen-injector injection, Inject 38 Units under the skin into the appropriate area as directed Daily., Disp: 6 mL, Rfl: 1    lamoTRIgine (LaMICtal) 200 MG tablet, Take 1 tablet by mouth Daily., Disp: 90 tablet, Rfl: 1    Lancets (accu-chek multiclix) lancets, Check glucose tid and prn; Accu-Chek Guide Me lancets; E11.9, Disp: 100 each, Rfl: 5    linaclotide (Linzess) 290 MCG capsule capsule, Take 1 capsule by mouth Every Morning Before Breakfast. Take 30 minutes before first daily meal., Disp: 30 capsule, Rfl: 12    lisinopril-hydrochlorothiazide (PRINZIDE,ZESTORETIC) 10-12.5 MG per tablet, TAKE ONE TABLET BY MOUTH DAILY, Disp: 90 tablet, Rfl: 1    metFORMIN ER (GLUCOPHAGE-XR) 500 MG 24 hr tablet, Take 2 tablets  by mouth 2 (Two) Times a Day With Meals., Disp: 360 tablet, Rfl: 1    Novofine Pen Needle 32G X 6 MM misc, FOR USE ONCE DAILY WITH TOUJEO, Disp: 100 each, Rfl: 1    sodium-potassium-magnesium sulfates (Suprep Bowel Prep Kit) 17.5-3.13-1.6 GM/177ML solution oral solution, Use as directed by provider for colonoscopy prep, Disp: 354 mL, Rfl: 0    spironolactone (ALDACTONE) 25 MG tablet, TAKE ONE TABLET BY MOUTH DAILY, Disp: 90 tablet, Rfl: 1    valACYclovir (VALTREX) 1000 MG tablet, Take 2 tabs PO q12h x 1 day PRN cold sore. Start ASAP after sx onset., Disp: 20 tablet, Rfl: 1     Patient denies the need for medication refills today.    OB History          3    Para   3    Term   3            AB        Living             SAB        IAB        Ectopic        Molar        Multiple        Live Births                    Past Medical History:   Diagnosis Date    Abnormal Pap smear of cervix     ADD (attention deficit disorder)     Alcoholism     Anxiety     Arthritis     Asthma     Bipolar disorder     Cancer Nasal cell carcinoma    Carpal tunnel syndrome     Depression     Diabetes     Fibroid 2022    Gestational hypertension 2001    H/O cold sores     Hyperlipidemia     Hypertension     Kidney failure     Leukocytosis     Migraine     Obesity     PCOS (polycystic ovarian syndrome)     Urinary tract infection         Past Surgical History:   Procedure Laterality Date    CERVICAL BIOPSY  W/ LOOP ELECTRODE EXCISION      CHOLECYSTECTOMY      DILATATION AND CURETTAGE  10/6/22    GANGLION CYST EXCISION      LEEP      REDUCTION MAMMAPLASTY      WISDOM TOOTH EXTRACTION  1986       Health Maintenance   Topic Date Due    COVID-19 Vaccine (2 - Booster for Shahla series) 2021    ZOSTER VACCINE (1 of 2) Never done    HEPATITIS C SCREENING  Never done    ANNUAL PHYSICAL  Never done    DIABETIC EYE EXAM  2023    DIABETIC FOOT EXAM  04/15/2023    Annual Gynecologic Pelvic and Breast Exam   "09/10/2023    Hepatitis B (1 of 3 - 3-dose series) 07/06/2024 (Originally 1970)    Pneumococcal Vaccine 0-64 (1 - PCV) 07/06/2024 (Originally 5/30/1976)    URINE MICROALBUMIN  09/23/2023    INFLUENZA VACCINE  10/01/2023    HEMOGLOBIN A1C  12/20/2023    BMI FOLLOWUP  01/13/2024    TDAP/TD VACCINES (2 - Td or Tdap) 05/05/2024    LIPID PANEL  06/20/2024    MAMMOGRAM  09/09/2024    PAP SMEAR  09/09/2025    COLORECTAL CANCER SCREENING  12/05/2032       The additional following portions of the patient's history were reviewed and updated as appropriate: allergies, current medications, past family history, past medical history, past social history, past surgical history, and problem list.    Review of Systems   Constitutional: Negative.    Respiratory: Negative.     Cardiovascular: Negative.    Gastrointestinal:  Positive for abdominal pain and constipation. Negative for blood in stool, nausea and vomiting.   Genitourinary:  Positive for menstrual problem. Negative for breast discharge, breast lump, breast pain and pelvic pain.   Musculoskeletal: Negative.    Neurological: Negative.    Psychiatric/Behavioral: Negative.         I have reviewed and agree with the HPI, ROS, and historical information as entered above. Amish Leavitt MD          Objective   /86   Ht 165.1 cm (65\")   Wt 112 kg (246 lb 9.6 oz)   Breastfeeding No   BMI 41.04 kg/m²     Physical Exam  Vitals reviewed. Exam conducted with a chaperone present.   Constitutional:       Appearance: Normal appearance.   HENT:      Head: Normocephalic and atraumatic.   Cardiovascular:      Rate and Rhythm: Normal rate and regular rhythm.   Pulmonary:      Effort: Pulmonary effort is normal.      Breath sounds: Normal breath sounds.   Chest:   Breasts:     Right: Normal.      Left: Normal.   Abdominal:      General: Bowel sounds are normal. There is distension.      Palpations: Abdomen is soft. There is no mass.      Tenderness: There is no abdominal " tenderness. There is no guarding or rebound.      Hernia: No hernia is present.   Genitourinary:     General: Normal vulva.      Vagina: Normal.      Cervix: Normal.      Uterus: Normal.       Adnexa: Right adnexa normal and left adnexa normal.   Musculoskeletal:      Cervical back: Neck supple.   Skin:     General: Skin is warm and dry.   Neurological:      Mental Status: She is alert and oriented to person, place, and time.   Psychiatric:         Mood and Affect: Mood normal.         Behavior: Behavior normal.          Assessment and Plan    Problem List Items Addressed This Visit    None  Visit Diagnoses       Encounter for annual routine gynecological examination    -  Primary    Relevant Orders    LIQUID-BASED PAP SMEAR WITH HPV GENOTYPING IF ASCUS ([facility])    History of abnormal cervical Papanicolaou smear        Relevant Orders    LIQUID-BASED PAP SMEAR WITH HPV GENOTYPING IF ASCUS ([facility])    Bloating        Relevant Orders    US Non-ob Transvaginal            GYN annual well woman exam.   Pap guidelines reviewed.  Reviewed monthly self breast exams.  Instructed to call with lumps, pain, or breast discharge.    Ordered Mammogram today  Recommended use of Vitamin D replacement and getting adequate calcium in her diet. (1500mg)  Reviewed exercise as a preventative health measures.   Reccommended Flu Vaccine in Fall of each year.  Suspect IBS- C. Will start on Linzess.  Rx sent today. Due to persistent bloating will proceed with pelvic US.   Return in about 4 weeks (around 10/11/2023) for visit and US.     Amish Leavitt MD  09/13/2023

## 2023-09-15 ENCOUNTER — HOSPITAL ENCOUNTER (OUTPATIENT)
Dept: MAMMOGRAPHY | Facility: HOSPITAL | Age: 53
Discharge: HOME | End: 2023-09-15
Admitting: OBSTETRICS & GYNECOLOGY
Payer: COMMERCIAL

## 2023-09-15 DIAGNOSIS — Z12.31 SCREENING MAMMOGRAM FOR BREAST CANCER: ICD-10-CM

## 2023-09-15 LAB — REF LAB TEST METHOD: NORMAL

## 2023-09-15 PROCEDURE — 77067 SCR MAMMO BI INCL CAD: CPT

## 2023-09-15 PROCEDURE — 77063 BREAST TOMOSYNTHESIS BI: CPT

## 2023-09-22 DIAGNOSIS — Z86.19 HISTORY OF COLD SORES: ICD-10-CM

## 2023-09-25 DIAGNOSIS — E11.65 TYPE 2 DIABETES MELLITUS WITH HYPERGLYCEMIA, WITHOUT LONG-TERM CURRENT USE OF INSULIN: ICD-10-CM

## 2023-09-25 RX ORDER — VALACYCLOVIR HYDROCHLORIDE 1 G/1
TABLET, FILM COATED ORAL
Qty: 20 TABLET | Refills: 1 | Status: SHIPPED | OUTPATIENT
Start: 2023-09-25

## 2023-09-25 RX ORDER — PEN NEEDLE, DIABETIC 32 GX 1/4"
NEEDLE, DISPOSABLE MISCELLANEOUS
Qty: 100 EACH | Refills: 1 | Status: SHIPPED | OUTPATIENT
Start: 2023-09-25

## 2023-10-10 NOTE — PROGRESS NOTES
[facility]  [address], [address]  [place], [place], [address]    [phone]  [email or web address]           OUTPATIENT CLINIC CONSULTATION NOTE    Patient care team:  Patient Care Team:  Rubens Bass APRN as PCP - General (Nurse Practitioner)  Reji Herndon MD as Consulting Physician (Hematology and Oncology)  Amish Leavitt MD as Consulting Physician (Obstetrics and Gynecology)    Requesting Provider and Reason for consultation: The patient is being seen today at the request of VALENCIA Mcghee for chest pain.     Subjective:   Chief complaint:   Chief Complaint   Patient presents with    Chest Pain    Shortness of Breath         Emily Heller is a 53 y.o. female.  Cardiac focused problem list:  Chest pain syndrome  Hypertension   Hyperlipidemia   Type 2 diabetes mellitus   Obesity   Bipolar  2 disorder     HPI:    Patient presents today in consultation for chest pain, dyspnea and palpitations.  Has had some atypical symptoms for the last several months.  Notes some chest tightness when walking a mile with her  in the parking lot of their [employer].  Has had 2 separate episodes of more intense chest pain while in her car at a drive-through.  First episode felt like squeezing in the left chest and resolved after a few minutes.  Second episode was a sharp pain in the chest and down the left arm that was brief.  She also has some dyspnea on exertion, intermittent dizziness and palpitations.    No prior cardiac testing.  Denies tobacco, EtOH or drug use.  No regular forms of exercise.  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  Family history includes her father who has A-fib and enlarged aorta.    Review of Systems:  As noted above in the HPI    PFSH:  Patient Active Problem List   Diagnosis    Leukocytosis    Benign essential HTN    Mixed hyperlipidemia    Type 2 diabetes mellitus     History of cold sores    Screening breast examination    Screening for colon cancer    Vitamin deficiency    Morbid obesity    Thickened endometrium    Menorrhagia with irregular cycle    Encounter for long-term (current) use of other medications    Bipolar 2 disorder    Chest pain         Current Outpatient Medications:     Alcohol Swabs pads, For use to check blood sugars and prior to giving insulin, Disp: 100 each, Rfl: 2    atorvastatin (LIPITOR) 10 MG tablet, Take 1 tablet by mouth Daily., Disp: 90 tablet, Rfl: 1    BIOTIN PO, Take  by mouth., Disp: , Rfl:     Continuous Blood Gluc Sensor (Dexcom G6 Sensor), Use 1 sensor every 10 days, Disp: 3 each, Rfl: 1    Continuous Blood Gluc Transmit (Dexcom G6 Transmitter) misc, Inject 1 kit under the skin into the appropriate area as directed 3 (Three) Times a Day., Disp: 1 each, Rfl: 0    Droplet Pen Needles 32G X 6 MM misc, USE ONCE DAILY WITH TOUJEO, Disp: 100 each, Rfl: 1    glucose blood test strip, Check fsbs tid and prn; Accu-Chek Guide Me Strips; E11.9, Disp: 100 each, Rfl: 5    insulin aspart (NovoLOG FlexPen) 100 UNIT/ML solution pen-injector sc pen, Can take 3 times daily with meals as needed based on sliding scale instructions for max dose of 8units, Disp: 6 mL, Rfl: 1    Insulin Glargine, 2 Unit Dial, (Toujeo Max SoloStar) 300 UNIT/ML solution pen-injector injection, Inject 38 Units under the skin into the appropriate area as directed Daily., Disp: 6 mL, Rfl: 1    lamoTRIgine (LaMICtal) 200 MG tablet, Take 1 tablet by mouth Daily., Disp: 90 tablet, Rfl: 1    Lancets (accu-chek multiclix) lancets, Check glucose tid and prn; Accu-Chek Guide Me lancets; E11.9, Disp: 100 each, Rfl: 5    linaclotide (Linzess) 290 MCG capsule capsule, Take 1 capsule by mouth Every Morning Before Breakfast. Take 30 minutes before first daily meal., Disp: 30 capsule, Rfl: 12    lisinopril-hydrochlorothiazide (PRINZIDE,ZESTORETIC) 10-12.5 MG per tablet, TAKE ONE TABLET BY MOUTH  "DAILY, Disp: 90 tablet, Rfl: 1    metFORMIN ER (GLUCOPHAGE-XR) 500 MG 24 hr tablet, Take 2 tablets by mouth 2 (Two) Times a Day With Meals., Disp: 360 tablet, Rfl: 1    sodium-potassium-magnesium sulfates (Suprep Bowel Prep Kit) 17.5-3.13-1.6 GM/177ML solution oral solution, Use as directed by provider for colonoscopy prep, Disp: 354 mL, Rfl: 0    spironolactone (ALDACTONE) 25 MG tablet, TAKE ONE TABLET BY MOUTH DAILY, Disp: 90 tablet, Rfl: 1    valACYclovir (VALTREX) 1000 MG tablet, TAKE TWO TABLETS BY MOUTH EVERY 12 HOURS FOR ONE DAY AS NEEDED FOR COLD SORE. START AS SOON AS POSSIBLE AFTER ONSET OF SYMPTOMS, Disp: 20 tablet, Rfl: 1    Allergies   Allergen Reactions    Morphine Anaphylaxis    Penicillins Anaphylaxis    Flagyl [Metronidazole] Other (See Comments)     BLISTER IN MOUTH       Social History     Socioeconomic History    Marital status:    Tobacco Use    Smoking status: Former     Packs/day: 1.00     Years: 15.00     Additional pack years: 0.00     Total pack years: 15.00     Types: Cigarettes     Start date: 1988     Quit date: 2002     Years since quittin.8    Smokeless tobacco: Never   Vaping Use    Vaping Use: Never used   Substance and Sexual Activity    Alcohol use: Yes     Comment: May have a glass of wine or a beer once every 3 or 4 months    Drug use: Never    Sexual activity: Yes     Partners: Male     Birth control/protection: Vasectomy     Family History   Problem Relation Age of Onset    Lung cancer Mother     Hyperlipidemia Mother     Depression Mother     Hyperlipidemia Father     Hypertension Father     Colon cancer Maternal Uncle     Breast cancer Paternal Aunt         age unknown    Breast cancer Paternal Aunt 50    Ovarian cancer Neg Hx          Objective:   Physical Exam:  /70 (BP Location: Right arm, Patient Position: Sitting, Cuff Size: Adult)   Pulse 79   Ht 165.1 cm (65\")   Wt 111 kg (244 lb 12.8 oz)   SpO2 98%   BMI 40.74 kg/m²   CONSTITUTIONAL: No " "acute distress  RESPIRATORY: Normal effort. Clear to auscultation bilaterally without wheezing or rales  CARDIOVASCULAR: Regular rate and rhythm with normal S1 and S2. Without murmur.  PERIPHERAL VASCULAR: No carotid bruit bilaterally.  Normal radial pulse. There is no lower extremity edema bilaterally.      Labs:  Labs reviewed by myself    No results found for: \"CHOL\"  Lab Results   Component Value Date    TRIG 132 06/20/2023     Lab Results   Component Value Date    HDL 52 06/20/2023     Lab Results   Component Value Date    LDL 79 06/20/2023     No components found for: \"LDLDIRECTC\"    Diagnostic Data:      ECG 12 Lead    Date/Time: 10/16/2023 1:47 PM  Performed by: Arpita Workman APRN    Authorized by: Arpita Workman APRN  Comparison: compared with previous ECG from 10/4/2022  Similar to previous ECG  Rhythm: sinus rhythm  Rate: normal  BPM: 81  Conduction: conduction normal              Assessment and Plan:     Chest pain, unspecified type  Palpitations  Dyspnea on exertion  -Symptom pattern with some mixed features for angina.  Patient does have some risk factors for coronary disease including HTN, HLD, diabetes and obesity.   -Recommend exercise nuclear stress test to evaluate for evidence of ischemia.  -14-day Holter monitor to assess for arrhythmias.  -Encouraged efforts towards risk factor lifestyle modification including heart healthy diet, regular exercise, blood pressure and cholesterol management and diabetes management.    Benign essential HTN  -Blood pressure well controlled.  -Continue current related medications.    Mixed hyperlipidemia  -Continue statin.  -Yearly lipid panel with PCP.       - Return in about 6 months (around 4/16/2024) for Next scheduled follow up.    Scribed for Stephan Higginbotham MD by VALENCIA Hamilton. 10/16/2023  13:47 EDT    I, Stephan Higginbotham MD, personally performed the services as scribed by the above named individual. I have made any necessary edits and it is both " accurate and complete.     Stephan Higginbotham MD, MSc, FACC, [facility]  Interventional Cardiology  [facility]

## 2023-10-16 ENCOUNTER — OFFICE VISIT (OUTPATIENT)
Dept: CARDIOLOGY | Facility: CLINIC | Age: 53
End: 2023-10-16
Payer: COMMERCIAL

## 2023-10-16 VITALS
HEIGHT: 65 IN | OXYGEN SATURATION: 98 % | SYSTOLIC BLOOD PRESSURE: 126 MMHG | DIASTOLIC BLOOD PRESSURE: 70 MMHG | HEART RATE: 79 BPM | BODY MASS INDEX: 40.79 KG/M2 | WEIGHT: 244.8 LBS

## 2023-10-16 DIAGNOSIS — I10 BENIGN ESSENTIAL HTN: ICD-10-CM

## 2023-10-16 DIAGNOSIS — R07.9 CHEST PAIN, UNSPECIFIED TYPE: Primary | ICD-10-CM

## 2023-10-16 DIAGNOSIS — R06.09 DYSPNEA ON EXERTION: ICD-10-CM

## 2023-10-16 DIAGNOSIS — E78.2 MIXED HYPERLIPIDEMIA: ICD-10-CM

## 2023-10-16 DIAGNOSIS — R00.2 PALPITATIONS: ICD-10-CM

## 2023-10-16 PROCEDURE — 93000 ELECTROCARDIOGRAM COMPLETE: CPT | Performed by: HOSPITALIST

## 2023-10-16 PROCEDURE — 99204 OFFICE O/P NEW MOD 45 MIN: CPT | Performed by: INTERNAL MEDICINE

## 2023-11-04 DIAGNOSIS — E11.65 TYPE 2 DIABETES MELLITUS WITH HYPERGLYCEMIA, WITHOUT LONG-TERM CURRENT USE OF INSULIN: ICD-10-CM

## 2023-11-06 RX ORDER — PROCHLORPERAZINE 25 MG/1
SUPPOSITORY RECTAL
Qty: 1 EACH | Refills: 0 | Status: SHIPPED | OUTPATIENT
Start: 2023-11-06

## 2023-11-06 RX ORDER — PROCHLORPERAZINE 25 MG/1
SUPPOSITORY RECTAL
Qty: 3 EACH | Refills: 1 | Status: SHIPPED | OUTPATIENT
Start: 2023-11-06

## 2023-12-01 ENCOUNTER — HOSPITAL ENCOUNTER (OUTPATIENT)
Dept: CARDIOLOGY | Facility: HOSPITAL | Age: 53
Discharge: HOME | End: 2023-12-01
Payer: COMMERCIAL

## 2023-12-01 VITALS
HEART RATE: 88 BPM | HEIGHT: 65 IN | DIASTOLIC BLOOD PRESSURE: 64 MMHG | WEIGHT: 244 LBS | SYSTOLIC BLOOD PRESSURE: 112 MMHG | BODY MASS INDEX: 40.65 KG/M2

## 2023-12-01 DIAGNOSIS — R00.2 PALPITATIONS: ICD-10-CM

## 2023-12-01 DIAGNOSIS — R07.9 CHEST PAIN, UNSPECIFIED TYPE: ICD-10-CM

## 2023-12-01 PROCEDURE — A9500 TC99M SESTAMIBI: HCPCS | Performed by: HOSPITALIST

## 2023-12-01 PROCEDURE — 0 TECHNETIUM SESTAMIBI: Performed by: HOSPITALIST

## 2023-12-01 PROCEDURE — 78452 HT MUSCLE IMAGE SPECT MULT: CPT

## 2023-12-01 PROCEDURE — 93017 CV STRESS TEST TRACING ONLY: CPT

## 2023-12-01 RX ADMIN — TECHNETIUM TC 99M SESTAMIBI 1 DOSE: 1 INJECTION INTRAVENOUS at 08:10

## 2023-12-01 RX ADMIN — TECHNETIUM TC 99M SESTAMIBI 1 DOSE: 1 INJECTION INTRAVENOUS at 09:50

## 2023-12-04 LAB
BH CV REST NUCLEAR ISOTOPE DOSE: 9.9 MCI
BH CV STRESS BP STAGE 1: NORMAL
BH CV STRESS BP STAGE 2: NORMAL
BH CV STRESS DURATION MIN STAGE 1: 3
BH CV STRESS DURATION MIN STAGE 2: 2
BH CV STRESS DURATION SEC STAGE 1: 0
BH CV STRESS DURATION SEC STAGE 2: 4
BH CV STRESS GRADE STAGE 1: 10
BH CV STRESS GRADE STAGE 2: 12
BH CV STRESS HR STAGE 1: 133
BH CV STRESS HR STAGE 2: 155
BH CV STRESS METS STAGE 1: 5
BH CV STRESS METS STAGE 2: 7.5
BH CV STRESS NUCLEAR ISOTOPE DOSE: 33 MCI
BH CV STRESS O2 STAGE 1: 98
BH CV STRESS O2 STAGE 2: 98
BH CV STRESS PROTOCOL 1: NORMAL
BH CV STRESS RECOVERY BP: NORMAL MMHG
BH CV STRESS RECOVERY HR: 99 BPM
BH CV STRESS RECOVERY O2: 100 %
BH CV STRESS SPEED STAGE 1: 1.7
BH CV STRESS SPEED STAGE 2: 2.5
BH CV STRESS STAGE 1: 1
BH CV STRESS STAGE 2: 2
LV EF NUC BP: 81 %
MAXIMAL PREDICTED HEART RATE: 168 BPM
PERCENT MAX PREDICTED HR: 92.26 %
STRESS BASELINE BP: NORMAL MMHG
STRESS BASELINE HR: 75 BPM
STRESS O2 SAT REST: 98 %
STRESS PERCENT HR: 109 %
STRESS POST ESTIMATED WORKLOAD: 7 METS
STRESS POST EXERCISE DUR MIN: 5 MIN
STRESS POST EXERCISE DUR SEC: 4 SEC
STRESS POST O2 SAT PEAK: 100 %
STRESS POST PEAK BP: NORMAL MMHG
STRESS POST PEAK HR: 155 BPM
STRESS TARGET HR: 143 BPM

## 2024-02-13 ENCOUNTER — OFFICE VISIT (OUTPATIENT)
Dept: FAMILY MEDICINE CLINIC | Facility: CLINIC | Age: 54
End: 2024-02-13
Payer: COMMERCIAL

## 2024-02-13 VITALS
HEIGHT: 65 IN | WEIGHT: 240.56 LBS | DIASTOLIC BLOOD PRESSURE: 78 MMHG | BODY MASS INDEX: 40.08 KG/M2 | OXYGEN SATURATION: 97 % | HEART RATE: 89 BPM | SYSTOLIC BLOOD PRESSURE: 136 MMHG

## 2024-02-13 DIAGNOSIS — R45.4 ANGER: ICD-10-CM

## 2024-02-13 DIAGNOSIS — Z00.00 ANNUAL PHYSICAL EXAM: Primary | ICD-10-CM

## 2024-02-13 DIAGNOSIS — F32.9 MAJOR DEPRESSIVE DISORDER WITH CURRENT ACTIVE EPISODE, UNSPECIFIED DEPRESSION EPISODE SEVERITY, UNSPECIFIED WHETHER RECURRENT: ICD-10-CM

## 2024-02-13 DIAGNOSIS — Z79.899 ENCOUNTER FOR LONG-TERM (CURRENT) USE OF OTHER MEDICATIONS: ICD-10-CM

## 2024-02-13 DIAGNOSIS — N92.1 MENORRHAGIA WITH IRREGULAR CYCLE: ICD-10-CM

## 2024-02-13 DIAGNOSIS — I10 BENIGN ESSENTIAL HTN: ICD-10-CM

## 2024-02-13 DIAGNOSIS — E78.2 MIXED HYPERLIPIDEMIA: ICD-10-CM

## 2024-02-13 DIAGNOSIS — Z12.39 SCREENING BREAST EXAMINATION: ICD-10-CM

## 2024-02-13 DIAGNOSIS — Z79.4 TYPE 2 DIABETES MELLITUS WITH HYPERGLYCEMIA, WITH LONG-TERM CURRENT USE OF INSULIN: ICD-10-CM

## 2024-02-13 DIAGNOSIS — E11.65 TYPE 2 DIABETES MELLITUS WITH HYPERGLYCEMIA, WITH LONG-TERM CURRENT USE OF INSULIN: ICD-10-CM

## 2024-02-13 DIAGNOSIS — Z11.59 NEED FOR HEPATITIS C SCREENING TEST: ICD-10-CM

## 2024-02-13 DIAGNOSIS — E66.01 CLASS 3 SEVERE OBESITY DUE TO EXCESS CALORIES WITH SERIOUS COMORBIDITY AND BODY MASS INDEX (BMI) OF 40.0 TO 44.9 IN ADULT: ICD-10-CM

## 2024-02-13 DIAGNOSIS — Z12.11 SCREENING FOR COLON CANCER: ICD-10-CM

## 2024-02-13 DIAGNOSIS — R93.89 THICKENED ENDOMETRIUM: ICD-10-CM

## 2024-02-13 DIAGNOSIS — F31.81 BIPOLAR 2 DISORDER: ICD-10-CM

## 2024-02-13 DIAGNOSIS — Z86.19 HISTORY OF COLD SORES: ICD-10-CM

## 2024-02-13 DIAGNOSIS — R82.90 NONSPECIFIC FINDING ON EXAMINATION OF URINE: ICD-10-CM

## 2024-02-13 PROBLEM — E66.813 CLASS 3 SEVERE OBESITY DUE TO EXCESS CALORIES WITH SERIOUS COMORBIDITY AND BODY MASS INDEX (BMI) OF 40.0 TO 44.9 IN ADULT: Status: ACTIVE | Noted: 2024-02-13

## 2024-02-13 LAB
BILIRUB BLD-MCNC: NEGATIVE MG/DL
CLARITY, POC: ABNORMAL
COLOR UR: YELLOW
EXPIRATION DATE: ABNORMAL
EXPIRATION DATE: NORMAL
GLUCOSE UR STRIP-MCNC: ABNORMAL MG/DL
KETONES UR QL: ABNORMAL
LEUKOCYTE EST, POC: NEGATIVE
Lab: ABNORMAL
Lab: NORMAL
NITRITE UR-MCNC: NEGATIVE MG/ML
PH UR: 5.5 [PH] (ref 5–8)
POC CREATININE URINE: 300
POC MICROALBUMIN URINE: 80
PROT UR STRIP-MCNC: ABNORMAL MG/DL
RBC # UR STRIP: NEGATIVE /UL
SP GR UR: 1.03 (ref 1–1.03)
UROBILINOGEN UR QL: NORMAL

## 2024-02-13 RX ORDER — ATORVASTATIN CALCIUM 10 MG/1
10 TABLET, FILM COATED ORAL DAILY
Qty: 90 TABLET | Refills: 1 | Status: SHIPPED | OUTPATIENT
Start: 2024-02-13

## 2024-02-13 RX ORDER — INSULIN GLARGINE 300 U/ML
38 INJECTION, SOLUTION SUBCUTANEOUS DAILY
Qty: 6 ML | Refills: 1 | Status: SHIPPED | OUTPATIENT
Start: 2024-02-13

## 2024-02-13 RX ORDER — SEMAGLUTIDE 0.68 MG/ML
0.25 INJECTION, SOLUTION SUBCUTANEOUS WEEKLY
Qty: 3 ML | Refills: 0 | Status: SHIPPED | OUTPATIENT
Start: 2024-02-13

## 2024-02-13 RX ORDER — METFORMIN HYDROCHLORIDE 500 MG/1
1000 TABLET, EXTENDED RELEASE ORAL 2 TIMES DAILY WITH MEALS
Qty: 360 TABLET | Refills: 1 | Status: SHIPPED | OUTPATIENT
Start: 2024-02-13

## 2024-02-13 RX ORDER — LISINOPRIL AND HYDROCHLOROTHIAZIDE 12.5; 1 MG/1; MG/1
1 TABLET ORAL DAILY
Qty: 90 TABLET | Refills: 1 | Status: SHIPPED | OUTPATIENT
Start: 2024-02-13

## 2024-02-13 RX ORDER — SPIRONOLACTONE 25 MG/1
25 TABLET ORAL DAILY
Qty: 90 TABLET | Refills: 1 | Status: SHIPPED | OUTPATIENT
Start: 2024-02-13

## 2024-02-13 RX ORDER — LAMOTRIGINE 200 MG/1
200 TABLET ORAL DAILY
Qty: 90 TABLET | Refills: 1 | Status: SHIPPED | OUTPATIENT
Start: 2024-02-13

## 2024-02-14 DIAGNOSIS — I10 BENIGN ESSENTIAL HTN: ICD-10-CM

## 2024-02-14 DIAGNOSIS — E11.65 TYPE 2 DIABETES MELLITUS WITH HYPERGLYCEMIA, WITH LONG-TERM CURRENT USE OF INSULIN: ICD-10-CM

## 2024-02-14 DIAGNOSIS — Z79.4 TYPE 2 DIABETES MELLITUS WITH HYPERGLYCEMIA, WITH LONG-TERM CURRENT USE OF INSULIN: ICD-10-CM

## 2024-02-14 LAB
ALBUMIN SERPL-MCNC: 4.5 G/DL (ref 3.8–4.9)
ALBUMIN/GLOB SERPL: 1.5 {RATIO} (ref 1.2–2.2)
ALP SERPL-CCNC: 111 IU/L (ref 44–121)
ALT SERPL-CCNC: 16 IU/L (ref 0–32)
AST SERPL-CCNC: 23 IU/L (ref 0–40)
BASOPHILS # BLD AUTO: 0.1 X10E3/UL (ref 0–0.2)
BASOPHILS NFR BLD AUTO: 0 %
BILIRUB SERPL-MCNC: 0.4 MG/DL (ref 0–1.2)
BUN SERPL-MCNC: 12 MG/DL (ref 6–24)
BUN/CREAT SERPL: 14 (ref 9–23)
CALCIUM SERPL-MCNC: 9.9 MG/DL (ref 8.7–10.2)
CHLORIDE SERPL-SCNC: 97 MMOL/L (ref 96–106)
CHOLEST SERPL-MCNC: 143 MG/DL (ref 100–199)
CO2 SERPL-SCNC: 24 MMOL/L (ref 20–29)
CREAT SERPL-MCNC: 0.83 MG/DL (ref 0.57–1)
EGFRCR SERPLBLD CKD-EPI 2021: 85 ML/MIN/1.73
EOSINOPHIL # BLD AUTO: 0.2 X10E3/UL (ref 0–0.4)
EOSINOPHIL NFR BLD AUTO: 2 %
ERYTHROCYTE [DISTWIDTH] IN BLOOD BY AUTOMATED COUNT: 13 % (ref 11.7–15.4)
GLOBULIN SER CALC-MCNC: 3 G/DL (ref 1.5–4.5)
GLUCOSE SERPL-MCNC: 247 MG/DL (ref 70–99)
HBA1C MFR BLD: 11.6 % (ref 4.8–5.6)
HCT VFR BLD AUTO: 36.8 % (ref 34–46.6)
HCV IGG SERPL QL IA: NON REACTIVE
HDLC SERPL-MCNC: 48 MG/DL
HGB BLD-MCNC: 12.3 G/DL (ref 11.1–15.9)
IMM GRANULOCYTES # BLD AUTO: 0 X10E3/UL (ref 0–0.1)
IMM GRANULOCYTES NFR BLD AUTO: 0 %
LDLC SERPL CALC-MCNC: 74 MG/DL (ref 0–99)
LYMPHOCYTES # BLD AUTO: 3.5 X10E3/UL (ref 0.7–3.1)
LYMPHOCYTES NFR BLD AUTO: 27 %
MCH RBC QN AUTO: 30.3 PG (ref 26.6–33)
MCHC RBC AUTO-ENTMCNC: 33.4 G/DL (ref 31.5–35.7)
MCV RBC AUTO: 91 FL (ref 79–97)
MONOCYTES # BLD AUTO: 0.8 X10E3/UL (ref 0.1–0.9)
MONOCYTES NFR BLD AUTO: 6 %
NEUTROPHILS # BLD AUTO: 8.3 X10E3/UL (ref 1.4–7)
NEUTROPHILS NFR BLD AUTO: 65 %
PLATELET # BLD AUTO: 359 X10E3/UL (ref 150–450)
POTASSIUM SERPL-SCNC: 4.7 MMOL/L (ref 3.5–5.2)
PROT SERPL-MCNC: 7.5 G/DL (ref 6–8.5)
RBC # BLD AUTO: 4.06 X10E6/UL (ref 3.77–5.28)
SODIUM SERPL-SCNC: 138 MMOL/L (ref 134–144)
TRIGL SERPL-MCNC: 116 MG/DL (ref 0–149)
TSH SERPL DL<=0.005 MIU/L-ACNC: 1.7 UIU/ML (ref 0.45–4.5)
VLDLC SERPL CALC-MCNC: 21 MG/DL (ref 5–40)
WBC # BLD AUTO: 12.9 X10E3/UL (ref 3.4–10.8)

## 2024-02-14 RX ORDER — METFORMIN HYDROCHLORIDE 500 MG/1
1000 TABLET, EXTENDED RELEASE ORAL 2 TIMES DAILY WITH MEALS
Qty: 360 TABLET | Refills: 1 | OUTPATIENT
Start: 2024-02-14

## 2024-02-14 RX ORDER — SPIRONOLACTONE 25 MG/1
25 TABLET ORAL DAILY
Qty: 90 TABLET | Refills: 1 | OUTPATIENT
Start: 2024-02-14

## 2024-02-14 RX ORDER — LISINOPRIL AND HYDROCHLOROTHIAZIDE 12.5; 1 MG/1; MG/1
1 TABLET ORAL DAILY
Qty: 90 TABLET | Refills: 1 | OUTPATIENT
Start: 2024-02-14

## 2024-02-16 LAB
BACTERIA UR CULT: NORMAL
BACTERIA UR CULT: NORMAL

## 2024-02-18 DIAGNOSIS — E11.65 TYPE 2 DIABETES MELLITUS WITH HYPERGLYCEMIA, WITHOUT LONG-TERM CURRENT USE OF INSULIN: ICD-10-CM

## 2024-02-19 DIAGNOSIS — D72.829 LEUKOCYTOSIS, UNSPECIFIED TYPE: Primary | ICD-10-CM

## 2024-02-19 RX ORDER — PROCHLORPERAZINE 25 MG/1
SUPPOSITORY RECTAL
Qty: 1 EACH | Refills: 0 | Status: SHIPPED | OUTPATIENT
Start: 2024-02-19

## 2024-02-23 ENCOUNTER — TELEPHONE (OUTPATIENT)
Dept: FAMILY MEDICINE CLINIC | Facility: CLINIC | Age: 54
End: 2024-02-23
Payer: COMMERCIAL

## 2024-03-15 NOTE — PROGRESS NOTES
Chief complaint/Reason for consult: T2DM    Consult requested by VALENCIA Mcghee    HPI: Ms. Heller is a 53-year-old female with T2DM, hypertension, hyperlipidemia, bipolar disorder and obesity who comes for consultation of uncontrolled diabetes.    # [id number], Uncontrolled due to hyperglycemia  with complications  # Elevated urine microalbumin  # Diabetic polyneuropathy   - Diagnosed: ~2014   - Current regimen includes: Toujeo 40 units daily, metformin XR 1000 mg twice daily and Ozempic 0.5 mg weekly  - Previously with very bothersome yeast infections   - Compliance with medications is poor in the past, seems to be a little better recently   - HbA1c: 11.6% done 2/13/2024  - Not using Dexcom CGM  - Check FSBG a few times per day and reports usually mid to upper 100s  - Hypoglycemia does not occur   - Hyperglycemia occurs with poor diet choices, much better on Ozempic   - Patient reports neuropathy that is very mild   - Patient denies gastroparesis   - Patient reports rotating injection sites   - Patient with known ASCVD   - taking ACEi/ARB; blood pressure today 120/70    DM Health Maintenance:  Ophtho: Done 2/6/2024, no retinopathy  Monofilament / Foot exam: Done 2/13/2024, patient reports no foot sores  Lipids/Statin: taking a statin with last FLP showing LDL 74 done 2/13/2024  SIENNA:Cr 267 done 2/13/2024  TSH: 1.700 done 2/13/2024  Aspirin: not taking    Past medical history, past surgical history, family history and social history reviewed within this encounter.     Review of Systems   Constitutional:  Positive for fatigue. Negative for activity change.   HENT:  Negative for trouble swallowing and voice change.    Eyes:  Negative for visual disturbance.   Respiratory:  Negative for shortness of breath.    Cardiovascular:  Positive for leg swelling. Negative for chest pain.   Gastrointestinal:  Negative for abdominal pain.   Endocrine: Negative for cold intolerance and heat intolerance.   Genitourinary:   "Negative for dysuria.   Musculoskeletal:  Negative for gait problem.   Skin:  Negative for rash and wound.   Neurological:  Positive for numbness and headaches.   Psychiatric/Behavioral:  Negative for agitation and confusion.         /70   Pulse 82   Ht 165.1 cm (65\")   Wt 107 kg (235 lb)   BMI 39.11 kg/m²      Physical Exam  Vitals reviewed.   Constitutional:       General: She is not in acute distress.     Appearance: She is obese.   HENT:      Head: Normocephalic.      Nose: Nose normal.   Eyes:      Conjunctiva/sclera: Conjunctivae normal.   Cardiovascular:      Rate and Rhythm: Normal rate.      Pulses: Normal pulses.           Dorsalis pedis pulses are 2+ on the right side and 2+ on the left side.   Pulmonary:      Effort: Pulmonary effort is normal.   Abdominal:      Tenderness: There is no guarding.   Musculoskeletal:         General: No deformity.      Right foot: No deformity or bunion.      Left foot: No deformity or bunion.   Feet:      Right foot:      Protective Sensation: 8 sites tested.  7 sites sensed.      Skin integrity: Skin integrity normal.      Toenail Condition: Right toenails are normal.      Left foot:      Protective Sensation: 8 sites tested.  7 sites sensed.      Skin integrity: Skin integrity normal.      Toenail Condition: Left toenails are normal.      Comments: Decreased sensation to monofilament on great toe bilaterally, otherwise unremarkable exam  Skin:     General: Skin is warm and dry.   Neurological:      Mental Status: She is alert and oriented to person, place, and time.   Psychiatric:         Mood and Affect: Mood normal.         Behavior: Behavior normal.        Labs and images reviewed as noted in the HPI    Assessment and plan:    Diagnoses and all orders for this visit:    1. Type 2 diabetes mellitus with hyperglycemia, with long-term current use of insulin (Primary)  Assessment & Plan:  -Diabetes is uncontrolled due to hyperglycemia but seems to be improving " greatly with recent addition of Ozempic  -Complications include known elevated urine microalbumin and diabetic polyneuropathy  -Patient remains at risk for complications due to persistent hyperglycemia; counseled that long term hyperglycemia can cause worsening neuropathy, kidney damage, eye damage and cardiovascular disease  -Continue Toujeo 40 units daily, metformin XR 1000 mg twice daily and Ozempic 0.5 mg weekly; increase Ozempic to 1.0 mg weekly if tolerated after 4 weeks  -Gave patient instructions for titration of Toujeo insulin based on blood glucoses  -Check FSBG at least once daily fasting  -Continue lisinopril; blood pressure today 120/70    DM Health Maintenance:  Ophtho: Done 2/6/2024, no retinopathy  Monofilament / Foot exam: Done today  Lipids/Statin: taking a statin with last FLP showing LDL 74 done 2/13/2024  SIENNA:Cr 267 done 2/13/2024  TSH: 1.700 done 2/13/2024  Aspirin: not taking      Orders:  -     Semaglutide,0.25 or 0.5MG/DOS, (Ozempic, 0.25 or 0.5 MG/DOSE,) 2 MG/3ML solution pen-injector; Inject 0.5 mg under the skin into the appropriate area as directed 1 (One) Time Per Week.  Dispense: 3 mL; Refill: 0  -     Insulin Glargine, 2 Unit Dial, (Toujeo Max SoloStar) 300 UNIT/ML solution pen-injector injection; Inject 40u daily, titrate for max daily dose of 50u  Dispense: 12 mL; Refill: 3  -     metFORMIN ER (GLUCOPHAGE-XR) 500 MG 24 hr tablet; Take 2 tablets by mouth 2 (Two) Times a Day With Meals.  Dispense: 360 tablet; Refill: 1  -     Insulin Pen Needle 32G X 4 MM misc; Use 1 each Daily.  Dispense: 100 each; Refill: 3    2. Benign essential HTN  Assessment & Plan:  -Patient reporting some lower blood pressure readings and increased fatigue during this period of time  -Stop HCTZ  -Continue lisinopril 10 mg daily  -Okay to continue spironolactone 25 mg as needed for edema    Orders:  -     lisinopril (PRINIVIL,ZESTRIL) 10 MG tablet; Take 1 tablet by mouth Daily.  Dispense: 90 tablet; Refill:  1         Return in about 4 months (around 7/20/2024) for T2DM.     Electronically signed by: Kyle S Rosenstein, MD

## 2024-03-20 ENCOUNTER — OFFICE VISIT (OUTPATIENT)
Dept: ENDOCRINOLOGY | Facility: CLINIC | Age: 54
End: 2024-03-20
Payer: COMMERCIAL

## 2024-03-20 VITALS
SYSTOLIC BLOOD PRESSURE: 120 MMHG | DIASTOLIC BLOOD PRESSURE: 70 MMHG | BODY MASS INDEX: 39.15 KG/M2 | HEIGHT: 65 IN | WEIGHT: 235 LBS | HEART RATE: 82 BPM

## 2024-03-20 DIAGNOSIS — I10 BENIGN ESSENTIAL HTN: ICD-10-CM

## 2024-03-20 DIAGNOSIS — Z79.4 TYPE 2 DIABETES MELLITUS WITH HYPERGLYCEMIA, WITH LONG-TERM CURRENT USE OF INSULIN: Primary | ICD-10-CM

## 2024-03-20 DIAGNOSIS — E11.65 TYPE 2 DIABETES MELLITUS WITH HYPERGLYCEMIA, WITH LONG-TERM CURRENT USE OF INSULIN: Primary | ICD-10-CM

## 2024-03-20 RX ORDER — LISINOPRIL 10 MG/1
10 TABLET ORAL DAILY
Qty: 90 TABLET | Refills: 1 | Status: SHIPPED | OUTPATIENT
Start: 2024-03-20

## 2024-03-20 RX ORDER — METFORMIN HYDROCHLORIDE 500 MG/1
1000 TABLET, EXTENDED RELEASE ORAL 2 TIMES DAILY WITH MEALS
Qty: 360 TABLET | Refills: 1 | Status: SHIPPED | OUTPATIENT
Start: 2024-03-20

## 2024-03-20 RX ORDER — SEMAGLUTIDE 0.68 MG/ML
0.5 INJECTION, SOLUTION SUBCUTANEOUS WEEKLY
Qty: 3 ML | Refills: 0 | Status: SHIPPED | OUTPATIENT
Start: 2024-03-20

## 2024-03-20 RX ORDER — INSULIN GLARGINE 300 U/ML
INJECTION, SOLUTION SUBCUTANEOUS
Qty: 12 ML | Refills: 3 | Status: SHIPPED | OUTPATIENT
Start: 2024-03-20

## 2024-03-20 NOTE — ASSESSMENT & PLAN NOTE
-Patient reporting some lower blood pressure readings and increased fatigue during this period of time  -Stop HCTZ  -Continue lisinopril 10 mg daily  -Okay to continue spironolactone 25 mg as needed for edema

## 2024-03-20 NOTE — ASSESSMENT & PLAN NOTE
-Diabetes is uncontrolled due to hyperglycemia but seems to be improving greatly with recent addition of Ozempic  -Complications include known elevated urine microalbumin and diabetic polyneuropathy  -Patient remains at risk for complications due to persistent hyperglycemia; counseled that long term hyperglycemia can cause worsening neuropathy, kidney damage, eye damage and cardiovascular disease  -Continue Toujeo 40 units daily, metformin XR 1000 mg twice daily and Ozempic 0.5 mg weekly; increase Ozempic to 1.0 mg weekly if tolerated after 4 weeks  -Gave patient instructions for titration of Toujeo insulin based on blood glucoses  -Check FSBG at least once daily fasting  -Continue lisinopril; blood pressure today 120/70    DM Health Maintenance:  Ophtho: Done 2/6/2024, no retinopathy  Monofilament / Foot exam: Done today  Lipids/Statin: taking a statin with last FLP showing LDL 74 done 2/13/2024  SIENNA:Cr 267 done 2/13/2024  TSH: 1.700 done 2/13/2024  Aspirin: not taking

## 2024-03-21 ENCOUNTER — PATIENT ROUNDING (BHMG ONLY) (OUTPATIENT)
Dept: ENDOCRINOLOGY | Facility: CLINIC | Age: 54
End: 2024-03-21
Payer: COMMERCIAL

## 2024-03-21 NOTE — PROGRESS NOTES
A [facility] message has been sent to the patient for patient rounding with [facility] [place]

## 2024-03-25 ENCOUNTER — OFFICE VISIT (OUTPATIENT)
Dept: FAMILY MEDICINE CLINIC | Facility: CLINIC | Age: 54
End: 2024-03-25
Payer: COMMERCIAL

## 2024-03-25 VITALS
WEIGHT: 234.13 LBS | BODY MASS INDEX: 39.01 KG/M2 | OXYGEN SATURATION: 98 % | HEART RATE: 89 BPM | SYSTOLIC BLOOD PRESSURE: 132 MMHG | DIASTOLIC BLOOD PRESSURE: 74 MMHG | HEIGHT: 65 IN

## 2024-03-25 DIAGNOSIS — M79.602 LEFT ARM PAIN: Primary | ICD-10-CM

## 2024-03-25 PROCEDURE — 99213 OFFICE O/P EST LOW 20 MIN: CPT | Performed by: NURSE PRACTITIONER

## 2024-03-25 NOTE — ASSESSMENT & PLAN NOTE
X-rays completed.  Patient states no risk or chance of pregnancy.  Will let know if radiologist sees anything.  Rest and ice in 15-minute intervals encouraged.  Limit picking up heavy objects and proper body mechanics discussed and encouraged.  Will try diclofenac as needed for pain.  Will go ahead and place referral to orthopedics for further evaluation.  Go to ED if worsens.  Risk of meds discussed and understood.  Return to clinic or ED with any issues or concerns.

## 2024-03-25 NOTE — PROGRESS NOTES
"Chief Complaint  Left Arm Pain    Subjective          Emily Heller presents to [facility]  History of Present Illness    Patient states for the past few weeks she has had left arm pain particularly in the bicep area.  States initially she was noticing it when she would try to raise the arm past parallel with the ground or when reaching for objects.  States 4 days ago the pain worsened out of the blue for no reason.  States she now has pain in the left bicep area that radiates into her anterior shoulder on the left side.  No redness no warmth no swelling.  No injury that she is aware of.  Again states pain with range of motion particularly when raising arm past parallel with the floor.  No weakness in .    Objective   Vital Signs:   /74   Pulse 89   Ht 165.1 cm (65\")   Wt 106 kg (234 lb 2 oz)   SpO2 98%   BMI 38.96 kg/m²     Body mass index is 38.96 kg/m².    Review of Systems   Constitutional:  Negative for chills, fatigue and fever.   Respiratory:  Negative for cough, chest tightness, shortness of breath and wheezing.    Cardiovascular:  Negative for chest pain and palpitations.   Musculoskeletal:  Positive for arthralgias. Negative for joint swelling.   Skin:  Negative for color change, rash, skin lesions and wound.   Neurological:  Negative for numbness and headache.       Past History:  Medical History: has a past medical history of Abnormal Pap smear of cervix (1996), ADD (attention deficit disorder), Alcoholism, Anxiety, Arthritis, Asthma, Bipolar disorder, Cancer (Nasal cell carcinoma), Carpal tunnel syndrome, Depression, Diabetes, Encounter for long-term (current) use of other medications (09/23/2022), Fibroid (9/2022), Gestational hypertension (4/1/2001), H/O cold sores, Hyperlipidemia, Hypertension, Kidney failure, Leukocytosis, Migraine (1995), Obesity, PCOS (polycystic ovarian syndrome), and Urinary tract infection.   Surgical History: has a past " surgical history that includes Reduction mammaplasty (2003); Cholecystectomy; Ganglion cyst excision; LEEP; Dilation and curettage of uterus (10/6/22); Cervical biopsy w/ loop electrode excision (1996); and Dawson tooth extraction (1986).   Family History: family history includes Breast cancer in her paternal aunt; Breast cancer (age of onset: 50) in her paternal aunt; Colon cancer in her maternal uncle; Depression in her mother; Hyperlipidemia in her father and mother; Hypertension in her father; Lung cancer in her mother.   Social History: reports that she quit smoking about 22 years ago. Her smoking use included cigarettes. She started smoking about 36 years ago. She has a 15 pack-year smoking history. She has never used smokeless tobacco. She reports current alcohol use. She reports that she does not use drugs.    PHQ-2 Depression Screening  Little interest or pleasure in doing things?     Feeling down, depressed, or hopeless?     PHQ-2 Total Score          PHQ-9 Depression Screening  Little interest or pleasure in doing things?     Feeling down, depressed, or hopeless?     Trouble falling or staying asleep, or sleeping too much?     Feeling tired or having little energy?     Poor appetite or overeating?     Feeling bad about yourself - or that you are a failure or have let yourself or your family down?     Trouble concentrating on things, such as reading the newspaper or watching television?     Moving or speaking so slowly that other people could have noticed? Or the opposite - being so fidgety or restless that you have been moving around a lot more than usual?     Thoughts that you would be better off dead, or of hurting yourself in some way?     PHQ-9 Total Score     If you checked off any problems, how difficult have these problems made it for you to do your work, take care of things at home, or get along with other people?       PHQ-9 Total Score:        Patient screened positive for depression based on a  PHQ-9 score of 0 on 2/13/2024. Follow-up recommendations include:          Current Outpatient Medications:     Alcohol Swabs pads, For use to check blood sugars and prior to giving insulin, Disp: 100 each, Rfl: 2    atorvastatin (LIPITOR) 10 MG tablet, Take 1 tablet by mouth Daily., Disp: 90 tablet, Rfl: 1    Continuous Blood Gluc Sensor (Dexcom G6 Sensor), USE 1 SENSOR EVERY 10 DAYS, Disp: 3 each, Rfl: 1    Continuous Blood Gluc Transmit (Dexcom G6 Transmitter) misc, USE TO TEST THREE TIMES A DAY AND REPLACE EVERY 90 DAYS, Disp: 1 each, Rfl: 0    glucose blood test strip, Check fsbs tid and prn; Accu-Chek Guide Me Strips; E11.9, Disp: 100 each, Rfl: 5    Insulin Glargine, 2 Unit Dial, (Toujeo Max SoloStar) 300 UNIT/ML solution pen-injector injection, Inject 40u daily, titrate for max daily dose of 50u, Disp: 12 mL, Rfl: 3    Insulin Pen Needle 32G X 4 MM misc, Use 1 each Daily., Disp: 100 each, Rfl: 3    lamoTRIgine (LaMICtal) 200 MG tablet, Take 1 tablet by mouth Daily., Disp: 90 tablet, Rfl: 1    Lancets (accu-chek multiclix) lancets, Check glucose tid and prn; Accu-Chek Guide Me lancets; E11.9, Disp: 100 each, Rfl: 5    linaclotide (Linzess) 290 MCG capsule capsule, Take 1 capsule by mouth Every Morning Before Breakfast. Take 30 minutes before first daily meal., Disp: 30 capsule, Rfl: 12    lisinopril (PRINIVIL,ZESTRIL) 10 MG tablet, Take 1 tablet by mouth Daily., Disp: 90 tablet, Rfl: 1    metFORMIN ER (GLUCOPHAGE-XR) 500 MG 24 hr tablet, Take 2 tablets by mouth 2 (Two) Times a Day With Meals., Disp: 360 tablet, Rfl: 1    Semaglutide,0.25 or 0.5MG/DOS, (Ozempic, 0.25 or 0.5 MG/DOSE,) 2 MG/3ML solution pen-injector, Inject 0.5 mg under the skin into the appropriate area as directed 1 (One) Time Per Week., Disp: 3 mL, Rfl: 0    spironolactone (ALDACTONE) 25 MG tablet, Take 1 tablet by mouth Daily., Disp: 90 tablet, Rfl: 1    valACYclovir (VALTREX) 1000 MG tablet, TAKE TWO TABLETS BY MOUTH EVERY 12 HOURS FOR ONE  DAY AS NEEDED FOR COLD SORE. START AS SOON AS POSSIBLE AFTER ONSET OF SYMPTOMS, Disp: 20 tablet, Rfl: 1    diclofenac (VOLTAREN) 50 MG EC tablet, Take 1 tablet by mouth 2 (Two) Times a Day As Needed (pain)., Disp: 12 tablet, Rfl: 0   (Not in a hospital admission)     Allergies: Morphine, Penicillins, and Flagyl [metronidazole]    Physical Exam  Constitutional:       Appearance: Normal appearance.   Cardiovascular:      Rate and Rhythm: Normal rate and regular rhythm.      Heart sounds: Normal heart sounds.   Pulmonary:      Effort: Pulmonary effort is normal.      Breath sounds: Normal breath sounds.   Musculoskeletal:      Left shoulder: Tenderness present. No swelling, bony tenderness or crepitus. Decreased range of motion. Normal strength. Normal pulse.      Left upper arm: Normal.      Left elbow: Normal.        Arms:       Comments: Tender to palpation to left anterior shoulder   Skin:     General: Skin is warm.      Findings: No erythema or rash.   Neurological:      General: No focal deficit present.      Mental Status: She is alert and oriented to person, place, and time. Mental status is at baseline.   Psychiatric:         Mood and Affect: Mood normal.         Behavior: Behavior normal.         Thought Content: Thought content normal.         Judgment: Judgment normal.          Result Review :                   Assessment and Plan    Diagnoses and all orders for this visit:    1. Left arm pain (Primary)  Assessment & Plan:  X-rays completed.  Patient states no risk or chance of pregnancy.  Will let know if radiologist sees anything.  Rest and ice in 15-minute intervals encouraged.  Limit picking up heavy objects and proper body mechanics discussed and encouraged.  Will try diclofenac as needed for pain.  Will go ahead and place referral to orthopedics for further evaluation.  Go to ED if worsens.  Risk of meds discussed and understood.  Return to clinic or ED with any issues or concerns.    Orders:  -      XR Shoulder 2+ View Left (In Office)  -     XR Humerus Left (In Office)  -     Ambulatory Referral to Orthopedic Surgery  -     diclofenac (VOLTAREN) 50 MG EC tablet; Take 1 tablet by mouth 2 (Two) Times a Day As Needed (pain).  Dispense: 12 tablet; Refill: 0                      Follow Up   Return if symptoms worsen or fail to improve.  Patient was given instructions and counseling regarding her condition or for health maintenance advice. Please see specific information pulled into the AVS if appropriate.     VALENCIA Mcghee

## 2024-03-26 ENCOUNTER — PRIOR AUTHORIZATION (OUTPATIENT)
Dept: ENDOCRINOLOGY | Facility: CLINIC | Age: 54
End: 2024-03-26
Payer: COMMERCIAL

## 2024-04-05 ENCOUNTER — TELEPHONE (OUTPATIENT)
Dept: ENDOCRINOLOGY | Facility: CLINIC | Age: 54
End: 2024-04-05
Payer: COMMERCIAL

## 2024-04-05 DIAGNOSIS — E11.65 TYPE 2 DIABETES MELLITUS WITH HYPERGLYCEMIA, WITH LONG-TERM CURRENT USE OF INSULIN: Primary | ICD-10-CM

## 2024-04-05 DIAGNOSIS — Z79.4 TYPE 2 DIABETES MELLITUS WITH HYPERGLYCEMIA, WITH LONG-TERM CURRENT USE OF INSULIN: Primary | ICD-10-CM

## 2024-04-05 RX ORDER — SEMAGLUTIDE 1.34 MG/ML
1 INJECTION, SOLUTION SUBCUTANEOUS WEEKLY
Qty: 9 ML | Refills: 0 | Status: SHIPPED | OUTPATIENT
Start: 2024-04-05

## 2024-04-23 ENCOUNTER — OFFICE VISIT (OUTPATIENT)
Dept: ORTHOPEDIC SURGERY | Facility: CLINIC | Age: 54
End: 2024-04-23
Payer: COMMERCIAL

## 2024-04-23 VITALS
WEIGHT: 237.8 LBS | SYSTOLIC BLOOD PRESSURE: 130 MMHG | HEIGHT: 66 IN | BODY MASS INDEX: 38.22 KG/M2 | DIASTOLIC BLOOD PRESSURE: 82 MMHG

## 2024-04-23 DIAGNOSIS — M54.2 CERVICALGIA: ICD-10-CM

## 2024-04-23 DIAGNOSIS — R73.09 ELEVATED HEMOGLOBIN A1C: ICD-10-CM

## 2024-04-23 DIAGNOSIS — M79.602 PAIN OF LEFT UPPER EXTREMITY: Primary | ICD-10-CM

## 2024-04-23 DIAGNOSIS — M25.512 LEFT SHOULDER PAIN, UNSPECIFIED CHRONICITY: ICD-10-CM

## 2024-04-23 PROCEDURE — 99203 OFFICE O/P NEW LOW 30 MIN: CPT | Performed by: ORTHOPAEDIC SURGERY

## 2024-04-23 NOTE — PROGRESS NOTES
[facility] [place]        Subjective     Pain and Initial Evaluation of the Left Arm      HPI    Emily Heller is a 53 y.o. female.  Patient is here today for new problem today regarding her left shoulder.  She is right-hand dominant.  She has a history of neck issues.  She says that she feels like there is an issue in the left shoulder but it is currently under control.  She says over the last 8 months, she has had anterior lateral shoulder pain.  Around a month ago, she woke up with left shoulder pain.  This gradually worsened with time.  Ibuprofen helped her a little bit only.  She says that she did have a couple of episodes where the pain radiated down the arm into the forearm and felt like it was exiting through her palm.  She says now she has some pain that radiates across her collarbone.          Past Medical History:   Diagnosis Date    Abnormal Pap smear of cervix 1996    ADD (attention deficit disorder)     Alcoholism     Anxiety     Arthritis     Asthma     Bipolar disorder     Cancer Nasal cell carcinoma    Carpal tunnel syndrome     Depression     Diabetes     Encounter for long-term (current) use of other medications 09/23/2022    Fibroid 9/2022    Gestational hypertension 4/1/2001    H/O cold sores     Hyperlipidemia     Hypertension     Kidney failure     Leukocytosis     Migraine 1995    Obesity     PCOS (polycystic ovarian syndrome)     Urinary tract infection       Past Surgical History:   Procedure Laterality Date    CERVICAL BIOPSY  W/ LOOP ELECTRODE EXCISION  1996    CHOLECYSTECTOMY      DILATATION AND CURETTAGE  10/6/22    GANGLION CYST EXCISION      LEEP      REDUCTION MAMMAPLASTY  2003    WISDOM TOOTH EXTRACTION  1986      Family History   Problem Relation Age of Onset    Lung cancer Mother     Hyperlipidemia Mother     Depression Mother     Hyperlipidemia Father     Hypertension Father     Colon cancer Maternal Uncle     Breast cancer Paternal Aunt         age  unknown    Breast cancer Paternal Aunt 50    Ovarian cancer Neg Hx      Social History     Socioeconomic History    Marital status:    Tobacco Use    Smoking status: Former     Current packs/day: 0.00     Average packs/day: 1 pack/day for 15.0 years (15.0 ttl pk-yrs)     Types: Cigarettes     Start date: 1988     Quit date: 2002     Years since quittin.3    Smokeless tobacco: Never   Vaping Use    Vaping status: Never Used   Substance and Sexual Activity    Alcohol use: Yes     Comment: May have a glass of wine or a beer once every 3 or 4 months    Drug use: Never    Sexual activity: Yes     Partners: Male     Birth control/protection: Vasectomy      Current Outpatient Medications on File Prior to Visit   Medication Sig Dispense Refill    Alcohol Swabs pads For use to check blood sugars and prior to giving insulin 100 each 2    atorvastatin (LIPITOR) 10 MG tablet Take 1 tablet by mouth Daily. 90 tablet 1    Continuous Blood Gluc Sensor (Dexcom G6 Sensor) USE 1 SENSOR EVERY 10 DAYS 3 each 1    Continuous Blood Gluc Transmit (Dexcom G6 Transmitter) misc USE TO TEST THREE TIMES A DAY AND REPLACE EVERY 90 DAYS 1 each 0    diclofenac (VOLTAREN) 50 MG EC tablet Take 1 tablet by mouth 2 (Two) Times a Day As Needed (pain). 12 tablet 0    glucose blood test strip Check fsbs tid and prn; Accu-Chek Guide Me Strips; E11.9 100 each 5    insulin degludec (TRESIBA FLEXTOUCH) 100 UNIT/ML solution pen-injector injection Inject 40 units once daily, titrate for max daily dose of 50 units 15 mL 5    Insulin Pen Needle 32G X 4 MM misc Use 1 each Daily. 100 each 3    lamoTRIgine (LaMICtal) 200 MG tablet Take 1 tablet by mouth Daily. 90 tablet 1    Lancets (accu-chek multiclix) lancets Check glucose tid and prn; Accu-Chek Guide Me lancets; E11.9 100 each 5    linaclotide (Linzess) 290 MCG capsule capsule Take 1 capsule by mouth Every Morning Before Breakfast. Take 30 minutes before first daily meal. 30 capsule 12     "lisinopril (PRINIVIL,ZESTRIL) 10 MG tablet Take 1 tablet by mouth Daily. 90 tablet 1    metFORMIN ER (GLUCOPHAGE-XR) 500 MG 24 hr tablet Take 2 tablets by mouth 2 (Two) Times a Day With Meals. 360 tablet 1    Semaglutide, 1 MG/DOSE, (Ozempic, 1 MG/DOSE,) 4 MG/3ML solution pen-injector Inject 1 mg under the skin into the appropriate area as directed 1 (One) Time Per Week. 9 mL 0    spironolactone (ALDACTONE) 25 MG tablet Take 1 tablet by mouth Daily. 90 tablet 1    valACYclovir (VALTREX) 1000 MG tablet TAKE TWO TABLETS BY MOUTH EVERY 12 HOURS FOR ONE DAY AS NEEDED FOR COLD SORE. START AS SOON AS POSSIBLE AFTER ONSET OF SYMPTOMS 20 tablet 1     No current facility-administered medications on file prior to visit.      Allergies   Allergen Reactions    Morphine Anaphylaxis    Penicillins Anaphylaxis    Flagyl [Metronidazole] Other (See Comments)     BLISTER IN MOUTH          Review of Systems   Constitutional: Negative.    HENT: Negative.     Eyes: Negative.    Respiratory: Negative.     Cardiovascular: Negative.    Gastrointestinal: Negative.    Endocrine: Negative.    Genitourinary: Negative.    Musculoskeletal:  Positive for arthralgias.   Skin: Negative.    Allergic/Immunologic: Negative.    Neurological: Negative.    Hematological: Negative.    Psychiatric/Behavioral: Negative.          I reviewed the patient's chief complaint, history of present illness, review of systems, past medical history, surgical history, family history, social history, medications and allergy list.        Objective      Physical Exam  /82   Ht 167 cm (65.75\")   Wt 108 kg (237 lb 12.8 oz)   BMI 38.68 kg/m²     Body mass index is 38.68 kg/m².    General  Mental Status - alert  General Appearance - cooperative, well groomed, not in acute distress  Orientation - Oriented X3  Build & Nutrition - well developed and well nourished  Posture - normal posture  Gait - normal gait       Ortho Exam  Musculoskeletal   Upper Extremity   Left " Shoulder       Strength and Tone:    Supraspinatus -5 out of 5    External Rotators-5/5    Infraspinatus - 5/5    Subscapularis - 5/5    Deltoid - 5/5     Range of Motion      Left Shoulder:    Internal Rotation: ROM - L4    External Rotation: AROM - 70 degrees    Elevation through flexion: AROM - 140 degrees     AC joint: Mildly tender to palpation    AC joint: Positive crossover        Imaging/Studies Reviewed and Interpreted:  Imaging Results (Last 24 Hours)       ** No results found for the last 24 hours. **            XR Humerus Left (In Office)  Narrative: XR HUMERUS LEFT    Date of Exam: 3/25/2024 10:25 AM EDT    Indication: left arm pain    Comparison: None available.    Findings:  Unremarkable appearance of the arm soft tissues. Normal bony mineralization. No acute fracture or malalignment. There is some osteophytic change at the ulnar aspect of the elbow joint. There is minimal enthesopathic change at the medial upper condyle.   Grossly unremarkable appearance of the partially evaluated shoulder joint.  Impression: Impression:  Unremarkable appearance of the humerus.    There are some degenerative changes at the elbow.    Electronically Signed: Bhavik Roman MD    3/25/2024 4:15 PM EDT    Workstation ID: [id number]  XR Shoulder 2+ View Left (In Office)  Narrative: XR SHOULDER 2+ VW LEFT    Date of Exam: 3/25/2024 10:25 AM EDT    Indication: left shoulder pain    Comparison: None available.    Findings:  The shoulder soft tissues are unremarkable. Normal appearance of the glenohumeral and acromioclavicular joints. No high-grade narrowing of the subacromial space. No acute fracture or traumatic malalignment. Unremarkable appearance of the partially imaged   left chest.  Impression: Impression:  Unremarkable appearance of the shoulder.    Electronically Signed: Bhavik Roman MD    3/25/2024 4:13 PM EDT    Workstation ID: [id number]       We have reviewed and interpreted the radiographs above.  Patient appears  to have significant AC joint space narrowing.  No lesions are noted.  No fractures are noted.    Assessment    Assessment:  1. Pain of left upper extremity    2. Left shoulder pain, unspecified chronicity    3. Elevated hemoglobin A1c    4. Cervicalgia        Plan:  Continue over-the-counter medication as needed for discomfort  Left upper extremity pain to the face of cervicalgia and hemoglobin A1c that was 11.6 most recently.--I recommended observation for now.  Should the pain return, I would strongly suspect cervical origin plus or minus AC joint problem.  See her back as needed going forward.        Lm Domínguez MD  04/23/24  17:57 EDT      Dictated Utilizing Dragon Dictation.

## 2024-04-25 DIAGNOSIS — E11.65 TYPE 2 DIABETES MELLITUS WITH HYPERGLYCEMIA, WITH LONG-TERM CURRENT USE OF INSULIN: ICD-10-CM

## 2024-04-25 DIAGNOSIS — Z79.4 TYPE 2 DIABETES MELLITUS WITH HYPERGLYCEMIA, WITH LONG-TERM CURRENT USE OF INSULIN: ICD-10-CM

## 2024-04-25 RX ORDER — SEMAGLUTIDE 0.68 MG/ML
0.5 INJECTION, SOLUTION SUBCUTANEOUS WEEKLY
Qty: 3 ML | Refills: 0 | OUTPATIENT
Start: 2024-04-25

## 2024-05-01 ENCOUNTER — OFFICE VISIT (OUTPATIENT)
Dept: CARDIOLOGY | Facility: CLINIC | Age: 54
End: 2024-05-01
Payer: COMMERCIAL

## 2024-05-01 VITALS
SYSTOLIC BLOOD PRESSURE: 122 MMHG | BODY MASS INDEX: 39.42 KG/M2 | HEART RATE: 76 BPM | DIASTOLIC BLOOD PRESSURE: 72 MMHG | WEIGHT: 236.6 LBS | OXYGEN SATURATION: 96 % | HEIGHT: 65 IN

## 2024-05-01 DIAGNOSIS — E78.2 MIXED HYPERLIPIDEMIA: Primary | ICD-10-CM

## 2024-05-01 DIAGNOSIS — I10 BENIGN ESSENTIAL HTN: ICD-10-CM

## 2024-05-01 NOTE — PROGRESS NOTES
[facility] Cardiology  [address], [address]  [place], [place], [address] [phone]  WWW.[facility]           OUTPATIENT CLINIC FOLLOW UP NOTE    Patient care team:  Patient Care Team:  Rubens Bass APRN as PCP - General (Nurse Practitioner)  Reji Herndon MD as Consulting Physician (Hematology and Oncology)  Amish Leavitt MD as Consulting Physician (Obstetrics and Gynecology)  Rosenstein, Kyle S, MD as Consulting Physician (Endocrinology)      Subjective:   Chief complaint:   Chief Complaint   Patient presents with    Hypertension    Hyperlipidemia         Emily Heller is a 53 y.o. female.  Cardiac focused problem list:  Chest pain syndrome  Stress test, 12/04/2023:  Normal myocardial perfusion study with no evidence of ischemia. Mild apical thinning fixed defect noted after attenuation correction. Low risk study.   14 day heart monitor, 11/10/2023:  No significant arrhythmia. Patient triggered events associated with normal sinus rhythm or isolated ectopy.   Hypertension   Hyperlipidemia   Type 2 diabetes mellitus   Obesity   Bipolar  2 disorder     HPI:    Patient presents today for follow up. She has been doing well from a cardiac standpoint since her last visit.  Stress test and heart monitor were acceptable. She continues to have fatigue.  Is following with an endocrinologist for her diabetes. Denies chest pain or shortness of breath.     Review of Systems:  As noted above in the HPI    PFSH:  Patient Active Problem List   Diagnosis    Leukocytosis    Benign essential HTN    Mixed hyperlipidemia    Type 2 diabetes mellitus    History of cold sores    Screening breast examination    Screening for colon cancer    Vitamin deficiency    Morbid obesity    Thickened endometrium    Menorrhagia with irregular cycle    Encounter for long-term (current) use of other medications    Bipolar 2 disorder    Chest pain    Annual physical exam    Class 3  severe obesity due to excess calories with serious comorbidity and body mass index (BMI) of 40.0 to 44.9 in adult    Need for hepatitis C screening test    Left arm pain         Current Outpatient Medications:     Alcohol Swabs pads, For use to check blood sugars and prior to giving insulin, Disp: 100 each, Rfl: 2    atorvastatin (LIPITOR) 10 MG tablet, Take 1 tablet by mouth Daily., Disp: 90 tablet, Rfl: 1    Continuous Blood Gluc Sensor (Dexcom G6 Sensor), USE 1 SENSOR EVERY 10 DAYS, Disp: 3 each, Rfl: 1    Continuous Blood Gluc Transmit (Dexcom G6 Transmitter) misc, USE TO TEST THREE TIMES A DAY AND REPLACE EVERY 90 DAYS, Disp: 1 each, Rfl: 0    diclofenac (VOLTAREN) 50 MG EC tablet, Take 1 tablet by mouth 2 (Two) Times a Day As Needed (pain)., Disp: 12 tablet, Rfl: 0    glucose blood test strip, Check fsbs tid and prn; Accu-Chek Guide Me Strips; E11.9, Disp: 100 each, Rfl: 5    insulin degludec (TRESIBA FLEXTOUCH) 100 UNIT/ML solution pen-injector injection, Inject 40 units once daily, titrate for max daily dose of 50 units, Disp: 15 mL, Rfl: 5    Insulin Pen Needle 32G X 4 MM misc, Use 1 each Daily., Disp: 100 each, Rfl: 3    lamoTRIgine (LaMICtal) 200 MG tablet, Take 1 tablet by mouth Daily., Disp: 90 tablet, Rfl: 1    Lancets (accu-chek multiclix) lancets, Check glucose tid and prn; Accu-Chek Guide Me lancets; E11.9, Disp: 100 each, Rfl: 5    linaclotide (Linzess) 290 MCG capsule capsule, Take 1 capsule by mouth Every Morning Before Breakfast. Take 30 minutes before first daily meal., Disp: 30 capsule, Rfl: 12    lisinopril (PRINIVIL,ZESTRIL) 10 MG tablet, Take 1 tablet by mouth Daily., Disp: 90 tablet, Rfl: 1    metFORMIN ER (GLUCOPHAGE-XR) 500 MG 24 hr tablet, Take 2 tablets by mouth 2 (Two) Times a Day With Meals., Disp: 360 tablet, Rfl: 1    Semaglutide, 1 MG/DOSE, (Ozempic, 1 MG/DOSE,) 4 MG/3ML solution pen-injector, Inject 1 mg under the skin into the appropriate area as directed 1 (One) Time Per Week.,  "Disp: 9 mL, Rfl: 0    spironolactone (ALDACTONE) 25 MG tablet, Take 1 tablet by mouth Daily., Disp: 90 tablet, Rfl: 1    valACYclovir (VALTREX) 1000 MG tablet, TAKE TWO TABLETS BY MOUTH EVERY 12 HOURS FOR ONE DAY AS NEEDED FOR COLD SORE. START AS SOON AS POSSIBLE AFTER ONSET OF SYMPTOMS, Disp: 20 tablet, Rfl: 1    Allergies   Allergen Reactions    Morphine Anaphylaxis    Penicillins Anaphylaxis    Flagyl [Metronidazole] Other (See Comments)     BLISTER IN MOUTH       Social History     Socioeconomic History    Marital status:    Tobacco Use    Smoking status: Former     Current packs/day: 0.00     Average packs/day: 1 pack/day for 15.0 years (15.0 ttl pk-yrs)     Types: Cigarettes     Start date: 1988     Quit date: 2002     Years since quittin.3     Passive exposure: Past    Smokeless tobacco: Never   Vaping Use    Vaping status: Never Used   Substance and Sexual Activity    Alcohol use: Yes     Comment: May have a glass of wine or a beer once every 3 or 4 months    Drug use: Never    Sexual activity: Yes     Partners: Male     Birth control/protection: Vasectomy     Family History   Problem Relation Age of Onset    Lung cancer Mother     Hyperlipidemia Mother     Depression Mother     Hyperlipidemia Father     Hypertension Father     Colon cancer Maternal Uncle     Breast cancer Paternal Aunt         age unknown    Breast cancer Paternal Aunt 50    Ovarian cancer Neg Hx          Objective:   Physical Exam:  /72 (BP Location: Right arm, Patient Position: Sitting)   Pulse 76   Ht 165.1 cm (65\")   Wt 107 kg (236 lb 9.6 oz)   SpO2 96%   BMI 39.37 kg/m²   CONSTITUTIONAL: No acute distress  RESPIRATORY: Normal effort. Clear to auscultation bilaterally without wheezing or rales  CARDIOVASCULAR: Regular rate and rhythm with normal S1 and S2. Without murmur.  PERIPHERAL VASCULAR: No carotid bruit bilaterally.  Normal radial pulse. There is no lower extremity edema " "bilaterally.      Labs:  Labs reviewed by myself    No results found for: \"CHOL\"  Lab Results   Component Value Date    TRIG 116 02/13/2024     Lab Results   Component Value Date    HDL 48 02/13/2024     Lab Results   Component Value Date    LDL 74 02/13/2024     No components found for: \"LDLDIRECTC\"    Diagnostic Data:    Procedures        Assessment and Plan:     Chest pain, unspecified type  Palpitations  Dyspnea on exertion  -Exercise nuclear stress without ischemia.   -14 day monitor without arrhythmias.   -Symptoms have improved.   -Encouraged efforts towards risk factor lifestyle modification including heart healthy diet, regular exercise, blood pressure and cholesterol management and diabetes management.    Benign essential HTN  -Blood pressure well controlled.  -Continue current related medications.    Mixed hyperlipidemia  -Continue statin.  -Yearly lipid panel with PCP.       - Return if symptoms worsen or fail to improve.    Electronically signed by VALENCIA Marks, 05/01/24, 9:39 AM EDT.    "

## 2024-06-24 ENCOUNTER — OFFICE VISIT (OUTPATIENT)
Dept: FAMILY MEDICINE CLINIC | Facility: CLINIC | Age: 54
End: 2024-06-24
Payer: COMMERCIAL

## 2024-06-24 VITALS
HEART RATE: 89 BPM | DIASTOLIC BLOOD PRESSURE: 82 MMHG | HEIGHT: 65 IN | OXYGEN SATURATION: 99 % | WEIGHT: 232 LBS | SYSTOLIC BLOOD PRESSURE: 140 MMHG | BODY MASS INDEX: 38.65 KG/M2

## 2024-06-24 DIAGNOSIS — M54.2 NECK PAIN: Primary | ICD-10-CM

## 2024-06-24 PROCEDURE — 99214 OFFICE O/P EST MOD 30 MIN: CPT | Performed by: NURSE PRACTITIONER

## 2024-06-24 RX ORDER — DICLOFENAC SODIUM 75 MG/1
75 TABLET, DELAYED RELEASE ORAL 2 TIMES DAILY PRN
Qty: 14 TABLET | Refills: 0 | Status: SHIPPED | OUTPATIENT
Start: 2024-06-24

## 2024-06-24 RX ORDER — TIZANIDINE 4 MG/1
4 TABLET ORAL EVERY 8 HOURS PRN
Qty: 30 TABLET | Refills: 0 | Status: SHIPPED | OUTPATIENT
Start: 2024-06-24

## 2024-06-24 NOTE — PROGRESS NOTES
"Chief Complaint  Neck Pain (Started 1 week ago, woke up with a sore neck. Has gotten worse and nothing is helping the pain.)    Subjective          Emily Heller presents to [facility]  History of Present Illness  Pt has had neck pain x 1 week. She denies known injury. She has tried Ibuprofen and Tylenol without improvement.       Objective   Vital Signs:   /82   Pulse 89   Ht 165.1 cm (65\")   Wt 105 kg (232 lb)   SpO2 99%   BMI 38.61 kg/m²     Body mass index is 38.61 kg/m².    Review of Systems   Constitutional:  Negative for fatigue and fever.   Respiratory:  Negative for shortness of breath.    Cardiovascular:  Negative for chest pain, palpitations and leg swelling.   Neurological:  Negative for syncope.   Psychiatric/Behavioral:  The patient is not nervous/anxious.           Current Outpatient Medications:     Alcohol Swabs pads, For use to check blood sugars and prior to giving insulin, Disp: 100 each, Rfl: 2    atorvastatin (LIPITOR) 10 MG tablet, Take 1 tablet by mouth Daily., Disp: 90 tablet, Rfl: 1    insulin degludec (TRESIBA FLEXTOUCH) 100 UNIT/ML solution pen-injector injection, Inject 40 units once daily, titrate for max daily dose of 50 units, Disp: 15 mL, Rfl: 5    lamoTRIgine (LaMICtal) 200 MG tablet, Take 1 tablet by mouth Daily., Disp: 90 tablet, Rfl: 1    linaclotide (Linzess) 290 MCG capsule capsule, Take 1 capsule by mouth Every Morning Before Breakfast. Take 30 minutes before first daily meal., Disp: 30 capsule, Rfl: 12    lisinopril (PRINIVIL,ZESTRIL) 10 MG tablet, Take 1 tablet by mouth Daily., Disp: 90 tablet, Rfl: 1    metFORMIN ER (GLUCOPHAGE-XR) 500 MG 24 hr tablet, Take 2 tablets by mouth 2 (Two) Times a Day With Meals., Disp: 360 tablet, Rfl: 1    Semaglutide, 1 MG/DOSE, (Ozempic, 1 MG/DOSE,) 4 MG/3ML solution pen-injector, Inject 1 mg under the skin into the appropriate area as directed 1 (One) Time Per Week., Disp: 9 mL, Rfl: 0    " spironolactone (ALDACTONE) 25 MG tablet, Take 1 tablet by mouth Daily., Disp: 90 tablet, Rfl: 1    valACYclovir (VALTREX) 1000 MG tablet, TAKE TWO TABLETS BY MOUTH EVERY 12 HOURS FOR ONE DAY AS NEEDED FOR COLD SORE. START AS SOON AS POSSIBLE AFTER ONSET OF SYMPTOMS, Disp: 20 tablet, Rfl: 1    diclofenac (VOLTAREN) 75 MG EC tablet, Take 1 tablet by mouth 2 (Two) Times a Day As Needed (pain)., Disp: 14 tablet, Rfl: 0    glucose blood test strip, Check fsbs tid and prn; Accu-Chek Guide Me Strips; E11.9, Disp: 100 each, Rfl: 5    Insulin Pen Needle 32G X 4 MM misc, Use 1 each Daily., Disp: 100 each, Rfl: 3    Lancets (accu-chek multiclix) lancets, Check glucose tid and prn; Accu-Chek Guide Me lancets; E11.9, Disp: 100 each, Rfl: 5    tiZANidine (ZANAFLEX) 4 MG tablet, Take 1 tablet by mouth Every 8 (Eight) Hours As Needed for Muscle Spasms., Disp: 30 tablet, Rfl: 0      Allergies: Morphine, Penicillins, and Flagyl [metronidazole]    Physical Exam  Constitutional:       Appearance: Normal appearance.   HENT:      Head: Normocephalic.   Eyes:      Conjunctiva/sclera: Conjunctivae normal.      Pupils: Pupils are equal, round, and reactive to light.   Cardiovascular:      Rate and Rhythm: Normal rate and regular rhythm.      Heart sounds: Normal heart sounds.   Pulmonary:      Effort: Pulmonary effort is normal.      Breath sounds: Normal breath sounds.   Abdominal:      Tenderness: There is no abdominal tenderness.   Musculoskeletal:         General: Normal range of motion.   Skin:     General: Skin is warm and dry.      Capillary Refill: Capillary refill takes less than 2 seconds.   Neurological:      General: No focal deficit present.      Mental Status: She is alert and oriented to person, place, and time.   Psychiatric:         Mood and Affect: Mood normal.         Behavior: Behavior normal.         Thought Content: Thought content normal.         Judgment: Judgment normal.          Result Review :                    Assessment and Plan    Diagnoses and all orders for this visit:    1. Neck pain (Primary)  Comments:  XRs done. We will order MRI. Tizanidine and Diclofenac PRN. Apply ice to painful areas. We will try PT if not improving and refer to neurosurgery.  Orders:  -     XR Spine Cervical 2 or 3 View; Future  -     tiZANidine (ZANAFLEX) 4 MG tablet; Take 1 tablet by mouth Every 8 (Eight) Hours As Needed for Muscle Spasms.  Dispense: 30 tablet; Refill: 0  -     diclofenac (VOLTAREN) 75 MG EC tablet; Take 1 tablet by mouth 2 (Two) Times a Day As Needed (pain).  Dispense: 14 tablet; Refill: 0  -     MRI Cervical Spine Without Contrast; Future                Follow Up   No follow-ups on file.  Patient was given instructions and counseling regarding her condition or for health maintenance advice. Please see specific information pulled into the AVS if appropriate.     VALENCIA Iniguez

## 2024-06-27 ENCOUNTER — TELEPHONE (OUTPATIENT)
Dept: FAMILY MEDICINE CLINIC | Facility: CLINIC | Age: 54
End: 2024-06-27
Payer: COMMERCIAL

## 2024-06-27 DIAGNOSIS — M54.2 NECK PAIN: Primary | ICD-10-CM

## 2024-06-27 NOTE — TELEPHONE ENCOUNTER
Caller: Emily Heller    Relationship: Self    Best call back number: [phone]     What is the medical concern/diagnosis: NECK PAIN     What specialty or service is being requested: PHYSICAL THERAPY     What is the provider, practice or medical service name: [facility]    What is the office location: [address] [place] [address]    What is the office phone number: [phone]    Any additional details: THE PATIENT ADVISED SHE SAW SHELLY INGRID ON 06/24/2024 FOR NECK PAIN, DECLINED A REFERRAL AT THAT TIME TO PHYSICAL THERAPY, BUT HAS SINCE CHANGED HER MIND AND IS REQUESTING THE REFERRAL.    PLEASE CALL THE PATIENT AND ADVISE.

## 2024-06-27 NOTE — TELEPHONE ENCOUNTER
What test was performed: X-RAY      When was the test performed: 06/24/2024     Where was the test performed: AT THE PRACTICE     Additional notes: PLEASE CALL THE PATIENT AND ADVISE.       NDorval

## 2024-06-27 NOTE — TELEPHONE ENCOUNTER
Caller: Frandy Heller    Relationship: Self    Best call back number: [phone]     Caller requesting test results: THE PATIENT FRANDY     What test was performed: X-RAY     When was the test performed: 06/24/2024    Where was the test performed: AT THE PRACTICE    Additional notes: PLEASE CALL THE PATIENT AND ADVISE.

## 2024-06-28 NOTE — TELEPHONE ENCOUNTER
Called pt she verbalized understanding. said she is going to do PT faxing order over to [facility] to [phone].

## 2024-06-28 NOTE — TELEPHONE ENCOUNTER
Xr showed degenerative disc disease and narrowing of the disc spaces. Let's see how the MRI looks. Thanks!

## 2024-07-02 ENCOUNTER — TELEPHONE (OUTPATIENT)
Dept: FAMILY MEDICINE CLINIC | Facility: CLINIC | Age: 54
End: 2024-07-02
Payer: COMMERCIAL

## 2024-07-02 DIAGNOSIS — Z79.4 TYPE 2 DIABETES MELLITUS WITH HYPERGLYCEMIA, WITH LONG-TERM CURRENT USE OF INSULIN: ICD-10-CM

## 2024-07-02 DIAGNOSIS — E11.65 TYPE 2 DIABETES MELLITUS WITH HYPERGLYCEMIA, WITH LONG-TERM CURRENT USE OF INSULIN: ICD-10-CM

## 2024-07-02 RX ORDER — SEMAGLUTIDE 1.34 MG/ML
1 INJECTION, SOLUTION SUBCUTANEOUS WEEKLY
Qty: 9 ML | Refills: 0 | Status: SHIPPED | OUTPATIENT
Start: 2024-07-02

## 2024-07-02 NOTE — TELEPHONE ENCOUNTER
Rx Refill Note  Requested Prescriptions     Pending Prescriptions Disp Refills    Ozempic, 1 MG/DOSE, 4 MG/3ML solution pen-injector [Pharmacy Med Name: OZEMPIC 1 MG/DOSE (4 MG/3 ML)] 9 mL 0     Sig: DIAL AND INJECT UNDER THE SKIN 1 MG WEEKLY      Last office visit with prescribing clinician: 3/20/2024     Next office visit with prescribing clinician: 7/24/2024     Lotus Dunn MA  07/02/24, 10:12 EDT

## 2024-07-23 NOTE — PROGRESS NOTES
Chief complaint/Reason for consult: T2DM    HPI: Ms. Heller is a 54-year-old  female with T2DM, hypertension, hyperlipidemia, bipolar disorder and obesity who comes for follow-up of diabetes.  She was last seen 3/20/2024 and reports since that time she has improved glucoses.      # Ecyc1SC, controlled with complications  # Elevated urine microalbumin  # Diabetic polyneuropathy   - Diagnosed: ~2014   - Current regimen includes: Tresiba 40 units daily, metformin XR 1000 mg twice daily and Ozempic 1.0 mg weekly  - Previously with very bothersome yeast infections, none recently   - Has occasional GI issues with Ozempic, typically diarrhea    - Compliance with medications is good   - HbA1c: 6.5% today, 11.6% done 2/13/2024  - Not using Dexcom CGM due to cost  - Check FSBG rarely, when she does it is low to mid 100s   - Hypoglycemia occurs rarely with fasting, nothing documented below 70 mg/dL  - Unsure if hyperglycemia is occurring   - Patient reports neuropathy that is very mild, essentially resolved  - Patient denies gastroparesis   - Patient reports rotating injection sites   - Patient with known ASCVD   - taking lisinopril 10 mg daily; blood pressure today 136/80     DM Health Maintenance:  Ophtho: Done 2/6/2024, no retinopathy  Monofilament / Foot exam: Done 3/20/2024  Lipids/Statin: taking a statin with last FLP showing LDL 74 done 2/13/2024  SIENNA:Cr 267 done 2/13/2024  TSH: 1.700 done 2/13/2024  Aspirin: not taking    Past medical history, past surgical history, family history and social history reviewed within this encounter.     Review of Systems   Constitutional:  Positive for appetite change. Negative for activity change and unexpected weight change.   HENT:  Negative for trouble swallowing and voice change.    Eyes:  Negative for visual disturbance.   Respiratory:  Negative for shortness of breath.    Cardiovascular:  Negative for chest pain.   Gastrointestinal:  Positive for diarrhea. Negative for abdominal  "pain.   Endocrine: Negative for cold intolerance and heat intolerance.   Genitourinary:  Negative for dysuria and vaginal discharge.   Musculoskeletal:  Negative for gait problem.   Neurological:  Positive for numbness.   Psychiatric/Behavioral:  Negative for agitation and confusion.         /80 (BP Location: Left arm, Patient Position: Sitting, Cuff Size: Adult)   Pulse 83   Ht 165.1 cm (65\")   Wt 105 kg (230 lb 6.4 oz)   SpO2 98%   BMI 38.34 kg/m²      Physical Exam  Vitals reviewed.   Constitutional:       General: She is not in acute distress.     Appearance: She is obese.   HENT:      Head: Normocephalic.      Nose: Nose normal.   Eyes:      Conjunctiva/sclera: Conjunctivae normal.   Cardiovascular:      Rate and Rhythm: Normal rate.   Pulmonary:      Effort: Pulmonary effort is normal.   Abdominal:      Tenderness: There is no guarding.   Musculoskeletal:         General: No deformity.   Skin:     General: Skin is warm and dry.   Neurological:      Mental Status: She is alert and oriented to person, place, and time.   Psychiatric:         Mood and Affect: Mood normal.         Behavior: Behavior normal.        Labs and images reviewed as noted in the HPI    Assessment and plan:    Diagnoses and all orders for this visit:    1. Type 2 diabetes mellitus with hyperglycemia, with long-term current use of insulin (Primary)  Assessment & Plan:  -Diabetes is currently well-controlled  -Complications include elevated urine microalbumin and diabetic polyneuropathy which is essentially resolved  -Continue dietary changes  -Recommend weight loss  -Reduce Tresiba to 34 units daily  -Continue metformin XR 1000 mg twice daily  -Patient would like to increase Ozempic to 2.0 mg weekly; counseled that this may increase her risk of side effects including diarrhea but she would still like to try it; if tolerated she will call me to send further refills  -Patient would like to see what her blood glucoses are doing with " CGM, prefers a device that has a coupled sensor/transmitter, will try Dexcom G7  -In the meantime recommend checking FSBG at least once daily  -May consider SGLT2 inhibitor in the future if she continues to be without yeast infection/UTI  -Continue lisinopril 10 mg daily; blood pressure today 136/80     DM Health Maintenance:  Ophtho: Done 2/6/2024, no retinopathy  Monofilament / Foot exam: Done 3/20/2024  Lipids/Statin: taking a statin with last FLP showing LDL 74 done 2/13/2024  SIENNA:Cr 267 done 2/13/2024  TSH: 1.700 done 2/13/2024  Aspirin: not taking    Orders:  -     POC Glycosylated Hemoglobin (Hb A1C)  -     Continuous Glucose Sensor (Dexcom G7 Sensor) misc; Use 1 Device Every 10 (Ten) Days.  Dispense: 3 each; Refill: 11  -     Semaglutide, 2 MG/DOSE, (Ozempic, 2 MG/DOSE,) 8 MG/3ML solution pen-injector; Inject 2 mg under the skin into the appropriate area as directed 1 (One) Time Per Week.  Dispense: 3 mL; Refill: 0  -     metFORMIN ER (GLUCOPHAGE-XR) 500 MG 24 hr tablet; Take 2 tablets by mouth 2 (Two) Times a Day With Meals.  Dispense: 360 tablet; Refill: 1  -     Insulin Pen Needle 32G X 4 MM misc; Use 1 each Daily.  Dispense: 100 each; Refill: 3  -     insulin degludec (TRESIBA FLEXTOUCH) 100 UNIT/ML solution pen-injector injection; Inject 34 units once daily, titrate for max daily dose of 50 units  Dispense: 15 mL; Refill: 5  -     glucose blood test strip; Use as directed daily; Accu-Chek Guide Me Strips; E11.9  Dispense: 100 each; Refill: 3    2. Mixed hyperlipidemia  Assessment & Plan:  -Continue atorvastatin 10 mg daily  -Repeat fasting lipid panel with next of the labs    Orders:  -     atorvastatin (LIPITOR) 10 MG tablet; Take 1 tablet by mouth Daily.  Dispense: 90 tablet; Refill: 1    3. Benign essential HTN  Assessment & Plan:  -Blood pressure is borderline elevated  -Recommend weight loss  -Continue lisinopril 10 mg daily  -Increasing the dose of Ozempic may have a slight impact on reducing her  blood pressure, recommend checking ambulatory readings    Orders:  -     lisinopril (PRINIVIL,ZESTRIL) 10 MG tablet; Take 1 tablet by mouth Daily.  Dispense: 90 tablet; Refill: 1         Return in about 4 months (around 11/24/2024) for T2DM.       Please note that portions of this note may have been completed with a voice recognition program. Efforts were made to edit the dictations, but occasionally words are mistranscribed.     Electronically signed by: Kyle S Rosenstein, MD

## 2024-07-24 ENCOUNTER — OFFICE VISIT (OUTPATIENT)
Dept: ENDOCRINOLOGY | Facility: CLINIC | Age: 54
End: 2024-07-24
Payer: COMMERCIAL

## 2024-07-24 VITALS
OXYGEN SATURATION: 98 % | SYSTOLIC BLOOD PRESSURE: 136 MMHG | HEIGHT: 65 IN | BODY MASS INDEX: 38.39 KG/M2 | DIASTOLIC BLOOD PRESSURE: 80 MMHG | HEART RATE: 83 BPM | WEIGHT: 230.4 LBS

## 2024-07-24 DIAGNOSIS — I10 BENIGN ESSENTIAL HTN: ICD-10-CM

## 2024-07-24 DIAGNOSIS — E78.2 MIXED HYPERLIPIDEMIA: ICD-10-CM

## 2024-07-24 DIAGNOSIS — Z79.4 TYPE 2 DIABETES MELLITUS WITH HYPERGLYCEMIA, WITH LONG-TERM CURRENT USE OF INSULIN: Primary | ICD-10-CM

## 2024-07-24 DIAGNOSIS — E11.65 TYPE 2 DIABETES MELLITUS WITH HYPERGLYCEMIA, WITH LONG-TERM CURRENT USE OF INSULIN: Primary | ICD-10-CM

## 2024-07-24 LAB
EXPIRATION DATE: ABNORMAL
HBA1C MFR BLD: 6.5 % (ref 4.5–5.7)
Lab: ABNORMAL

## 2024-07-24 RX ORDER — METFORMIN HYDROCHLORIDE 500 MG/1
1000 TABLET, EXTENDED RELEASE ORAL 2 TIMES DAILY WITH MEALS
Qty: 360 TABLET | Refills: 1 | Status: SHIPPED | OUTPATIENT
Start: 2024-07-24

## 2024-07-24 RX ORDER — ATORVASTATIN CALCIUM 10 MG/1
10 TABLET, FILM COATED ORAL DAILY
Qty: 90 TABLET | Refills: 1 | Status: SHIPPED | OUTPATIENT
Start: 2024-07-24

## 2024-07-24 RX ORDER — ACYCLOVIR 400 MG/1
1 TABLET ORAL
Qty: 3 EACH | Refills: 11 | Status: SHIPPED | OUTPATIENT
Start: 2024-07-24

## 2024-07-24 RX ORDER — LISINOPRIL 10 MG/1
10 TABLET ORAL DAILY
Qty: 90 TABLET | Refills: 1 | Status: SHIPPED | OUTPATIENT
Start: 2024-07-24

## 2024-07-24 RX ORDER — SEMAGLUTIDE 2.68 MG/ML
2 INJECTION, SOLUTION SUBCUTANEOUS WEEKLY
Qty: 3 ML | Refills: 0 | Status: SHIPPED | OUTPATIENT
Start: 2024-07-24

## 2024-07-24 NOTE — ASSESSMENT & PLAN NOTE
-Blood pressure is borderline elevated  -Recommend weight loss  -Continue lisinopril 10 mg daily  -Increasing the dose of Ozempic may have a slight impact on reducing her blood pressure, recommend checking ambulatory readings

## 2024-07-24 NOTE — ASSESSMENT & PLAN NOTE
-Diabetes is currently well-controlled  -Complications include elevated urine microalbumin and diabetic polyneuropathy which is essentially resolved  -Continue dietary changes  -Recommend weight loss  -Reduce Tresiba to 34 units daily  -Continue metformin XR 1000 mg twice daily  -Patient would like to increase Ozempic to 2.0 mg weekly; counseled that this may increase her risk of side effects including diarrhea but she would still like to try it; if tolerated she will call me to send further refills  -Patient would like to see what her blood glucoses are doing with CGM, prefers a device that has a coupled sensor/transmitter, will try Dexcom G7  -In the meantime recommend checking FSBG at least once daily  -May consider SGLT2 inhibitor in the future if she continues to be without yeast infection/UTI  -Continue lisinopril 10 mg daily; blood pressure today 136/80     DM Health Maintenance:  Ophtho: Done 2/6/2024, no retinopathy  Monofilament / Foot exam: Done 3/20/2024  Lipids/Statin: taking a statin with last FLP showing LDL 74 done 2/13/2024  SIENNA:Cr 267 done 2/13/2024  TSH: 1.700 done 2/13/2024  Aspirin: not taking

## 2024-08-10 DIAGNOSIS — I10 BENIGN ESSENTIAL HTN: ICD-10-CM

## 2024-08-12 RX ORDER — SPIRONOLACTONE 25 MG/1
25 TABLET ORAL DAILY
Qty: 90 TABLET | Refills: 0 | Status: SHIPPED | OUTPATIENT
Start: 2024-08-12

## 2024-08-16 DIAGNOSIS — Z86.19 HISTORY OF COLD SORES: ICD-10-CM

## 2024-08-16 RX ORDER — VALACYCLOVIR HYDROCHLORIDE 1 G/1
TABLET, FILM COATED ORAL
Qty: 20 TABLET | Refills: 2 | Status: SHIPPED | OUTPATIENT
Start: 2024-08-16

## 2024-08-19 ENCOUNTER — TRANSCRIBE ORDERS (OUTPATIENT)
Dept: ADMINISTRATIVE | Facility: HOSPITAL | Age: 54
End: 2024-08-19
Payer: COMMERCIAL

## 2024-08-19 DIAGNOSIS — Z12.31 SCREENING MAMMOGRAM FOR BREAST CANCER: Primary | ICD-10-CM

## 2024-08-23 DIAGNOSIS — E11.65 TYPE 2 DIABETES MELLITUS WITH HYPERGLYCEMIA, WITH LONG-TERM CURRENT USE OF INSULIN: ICD-10-CM

## 2024-08-23 DIAGNOSIS — Z79.4 TYPE 2 DIABETES MELLITUS WITH HYPERGLYCEMIA, WITH LONG-TERM CURRENT USE OF INSULIN: ICD-10-CM

## 2024-08-23 RX ORDER — SEMAGLUTIDE 2.68 MG/ML
INJECTION, SOLUTION SUBCUTANEOUS
Qty: 3 ML | Refills: 0 | Status: SHIPPED | OUTPATIENT
Start: 2024-08-23

## 2024-08-23 NOTE — TELEPHONE ENCOUNTER
Rx Refill Note  Requested Prescriptions     Pending Prescriptions Disp Refills    Ozempic, 2 MG/DOSE, 8 MG/3ML solution pen-injector [Pharmacy Med Name: OZEMPIC 2 MG/DOSE (8 MG/3 ML)] 3 mL 0     Sig: DIAL AND INJECT UNDER THE SKIN 2 MG WEEKLY      Last office visit with prescribing clinician: 7/24/2024     Next office visit with prescribing clinician: 11/27/2024     Lotus Dunn MA  08/23/24, 09:50 EDT

## 2024-09-18 DIAGNOSIS — Z79.4 TYPE 2 DIABETES MELLITUS WITH HYPERGLYCEMIA, WITH LONG-TERM CURRENT USE OF INSULIN: ICD-10-CM

## 2024-09-18 DIAGNOSIS — E11.65 TYPE 2 DIABETES MELLITUS WITH HYPERGLYCEMIA, WITH LONG-TERM CURRENT USE OF INSULIN: ICD-10-CM

## 2024-09-18 RX ORDER — SEMAGLUTIDE 2.68 MG/ML
INJECTION, SOLUTION SUBCUTANEOUS
Qty: 3 ML | Refills: 2 | Status: SHIPPED | OUTPATIENT
Start: 2024-09-18

## 2024-09-20 ENCOUNTER — APPOINTMENT (OUTPATIENT)
Dept: CT IMAGING | Facility: HOSPITAL | Age: 54
End: 2024-09-20
Payer: COMMERCIAL

## 2024-09-20 ENCOUNTER — OFFICE VISIT (OUTPATIENT)
Dept: FAMILY MEDICINE CLINIC | Facility: CLINIC | Age: 54
End: 2024-09-20
Payer: COMMERCIAL

## 2024-09-20 ENCOUNTER — APPOINTMENT (OUTPATIENT)
Dept: GENERAL RADIOLOGY | Facility: HOSPITAL | Age: 54
End: 2024-09-20
Payer: COMMERCIAL

## 2024-09-20 ENCOUNTER — HOSPITAL ENCOUNTER (EMERGENCY)
Facility: HOSPITAL | Age: 54
Discharge: HOME | End: 2024-09-20
Attending: EMERGENCY MEDICINE
Payer: COMMERCIAL

## 2024-09-20 VITALS
DIASTOLIC BLOOD PRESSURE: 64 MMHG | OXYGEN SATURATION: 99 % | TEMPERATURE: 97.8 F | WEIGHT: 217 LBS | SYSTOLIC BLOOD PRESSURE: 108 MMHG | BODY MASS INDEX: 36.15 KG/M2 | HEART RATE: 89 BPM | HEIGHT: 65 IN | RESPIRATION RATE: 16 BRPM

## 2024-09-20 VITALS
RESPIRATION RATE: 18 BRPM | DIASTOLIC BLOOD PRESSURE: 66 MMHG | OXYGEN SATURATION: 99 % | HEIGHT: 65 IN | HEART RATE: 92 BPM | BODY MASS INDEX: 38.49 KG/M2 | WEIGHT: 231 LBS | SYSTOLIC BLOOD PRESSURE: 120 MMHG

## 2024-09-20 DIAGNOSIS — R07.9 CHEST PAIN, UNSPECIFIED TYPE: Primary | ICD-10-CM

## 2024-09-20 DIAGNOSIS — Z79.4 TYPE 2 DIABETES MELLITUS WITHOUT COMPLICATION, WITH LONG-TERM CURRENT USE OF INSULIN: ICD-10-CM

## 2024-09-20 DIAGNOSIS — E11.9 TYPE 2 DIABETES MELLITUS WITHOUT COMPLICATION, WITH LONG-TERM CURRENT USE OF INSULIN: ICD-10-CM

## 2024-09-20 DIAGNOSIS — R22.2 CHEST MASS: ICD-10-CM

## 2024-09-20 DIAGNOSIS — R42 DIZZINESS: Primary | ICD-10-CM

## 2024-09-20 LAB
ALBUMIN SERPL-MCNC: 4.3 G/DL (ref 3.5–5.2)
ALBUMIN/GLOB SERPL: 1.3 G/DL
ALP SERPL-CCNC: 114 U/L (ref 39–117)
ALT SERPL W P-5'-P-CCNC: 17 U/L (ref 1–33)
ANION GAP SERPL CALCULATED.3IONS-SCNC: 16 MMOL/L (ref 5–15)
AST SERPL-CCNC: 19 U/L (ref 1–32)
BACTERIA UR QL AUTO: ABNORMAL /HPF
BASOPHILS # BLD AUTO: 0.06 10*3/MM3 (ref 0–0.2)
BASOPHILS NFR BLD AUTO: 0.4 % (ref 0–1.5)
BILIRUB SERPL-MCNC: 0.4 MG/DL (ref 0–1.2)
BILIRUB UR QL STRIP: NEGATIVE
BUN SERPL-MCNC: 14 MG/DL (ref 6–20)
BUN/CREAT SERPL: 14.9 (ref 7–25)
CALCIUM SPEC-SCNC: 9.5 MG/DL (ref 8.6–10.5)
CHLORIDE SERPL-SCNC: 96 MMOL/L (ref 98–107)
CLARITY UR: CLEAR
CO2 SERPL-SCNC: 24 MMOL/L (ref 22–29)
COLOR UR: YELLOW
CREAT SERPL-MCNC: 0.94 MG/DL (ref 0.57–1)
DEPRECATED RDW RBC AUTO: 38.7 FL (ref 37–54)
EGFRCR SERPLBLD CKD-EPI 2021: 72.7 ML/MIN/1.73
EOSINOPHIL # BLD AUTO: 0.19 10*3/MM3 (ref 0–0.4)
EOSINOPHIL NFR BLD AUTO: 1.4 % (ref 0.3–6.2)
ERYTHROCYTE [DISTWIDTH] IN BLOOD BY AUTOMATED COUNT: 12.1 % (ref 12.3–15.4)
FLUAV SUBTYP SPEC NAA+PROBE: NOT DETECTED
FLUBV RNA ISLT QL NAA+PROBE: NOT DETECTED
GEN 5 2HR TROPONIN T REFLEX: <6 NG/L
GLOBULIN UR ELPH-MCNC: 3.3 GM/DL
GLUCOSE SERPL-MCNC: 93 MG/DL (ref 65–99)
GLUCOSE UR STRIP-MCNC: NEGATIVE MG/DL
HCT VFR BLD AUTO: 32.2 % (ref 34–46.6)
HGB BLD-MCNC: 10.8 G/DL (ref 12–15.9)
HGB UR QL STRIP.AUTO: NEGATIVE
HYALINE CASTS UR QL AUTO: ABNORMAL /LPF
IMM GRANULOCYTES # BLD AUTO: 0.05 10*3/MM3 (ref 0–0.05)
IMM GRANULOCYTES NFR BLD AUTO: 0.4 % (ref 0–0.5)
KETONES UR QL STRIP: NEGATIVE
LEUKOCYTE ESTERASE UR QL STRIP.AUTO: ABNORMAL
LIPASE SERPL-CCNC: 74 U/L (ref 13–60)
LYMPHOCYTES # BLD AUTO: 4.45 10*3/MM3 (ref 0.7–3.1)
LYMPHOCYTES NFR BLD AUTO: 32 % (ref 19.6–45.3)
MCH RBC QN AUTO: 29.3 PG (ref 26.6–33)
MCHC RBC AUTO-ENTMCNC: 33.5 G/DL (ref 31.5–35.7)
MCV RBC AUTO: 87.3 FL (ref 79–97)
MONOCYTES # BLD AUTO: 0.76 10*3/MM3 (ref 0.1–0.9)
MONOCYTES NFR BLD AUTO: 5.5 % (ref 5–12)
NEUTROPHILS NFR BLD AUTO: 60.3 % (ref 42.7–76)
NEUTROPHILS NFR BLD AUTO: 8.38 10*3/MM3 (ref 1.7–7)
NITRITE UR QL STRIP: NEGATIVE
NRBC BLD AUTO-RTO: 0 /100 WBC (ref 0–0.2)
PH UR STRIP.AUTO: 6.5 [PH] (ref 5–8)
PLATELET # BLD AUTO: 404 10*3/MM3 (ref 140–450)
PMV BLD AUTO: 9 FL (ref 6–12)
POTASSIUM SERPL-SCNC: 3.7 MMOL/L (ref 3.5–5.2)
PROT SERPL-MCNC: 7.6 G/DL (ref 6–8.5)
PROT UR QL STRIP: NEGATIVE
RBC # BLD AUTO: 3.69 10*6/MM3 (ref 3.77–5.28)
RBC # UR STRIP: ABNORMAL /HPF
REF LAB TEST METHOD: ABNORMAL
RENAL EPI CELLS #/AREA URNS HPF: ABNORMAL /HPF
SARS-COV-2 RNA RESP QL NAA+PROBE: NOT DETECTED
SODIUM SERPL-SCNC: 136 MMOL/L (ref 136–145)
SP GR UR STRIP: <=1.005 (ref 1–1.03)
SQUAMOUS #/AREA URNS HPF: ABNORMAL /HPF
TRANS CELLS #/AREA URNS HPF: ABNORMAL /HPF
TROPONIN T DELTA: NORMAL
TROPONIN T SERPL HS-MCNC: 7 NG/L
UROBILINOGEN UR QL STRIP: ABNORMAL
WBC # UR STRIP: ABNORMAL /HPF
WBC NRBC COR # BLD AUTO: 13.89 10*3/MM3 (ref 3.4–10.8)

## 2024-09-20 PROCEDURE — 25810000003 SODIUM CHLORIDE 0.9 % SOLUTION: Performed by: NURSE PRACTITIONER

## 2024-09-20 PROCEDURE — 87636 SARSCOV2 & INF A&B AMP PRB: CPT | Performed by: NURSE PRACTITIONER

## 2024-09-20 PROCEDURE — 70450 CT HEAD/BRAIN W/O DYE: CPT

## 2024-09-20 PROCEDURE — 25510000001 IOPAMIDOL PER 1 ML: Performed by: EMERGENCY MEDICINE

## 2024-09-20 PROCEDURE — 71045 X-RAY EXAM CHEST 1 VIEW: CPT

## 2024-09-20 PROCEDURE — 99285 EMERGENCY DEPT VISIT HI MDM: CPT

## 2024-09-20 PROCEDURE — 80053 COMPREHEN METABOLIC PANEL: CPT | Performed by: NURSE PRACTITIONER

## 2024-09-20 PROCEDURE — 99214 OFFICE O/P EST MOD 30 MIN: CPT | Performed by: NURSE PRACTITIONER

## 2024-09-20 PROCEDURE — 83690 ASSAY OF LIPASE: CPT | Performed by: NURSE PRACTITIONER

## 2024-09-20 PROCEDURE — 85025 COMPLETE CBC W/AUTO DIFF WBC: CPT | Performed by: NURSE PRACTITIONER

## 2024-09-20 PROCEDURE — 81001 URINALYSIS AUTO W/SCOPE: CPT | Performed by: NURSE PRACTITIONER

## 2024-09-20 PROCEDURE — 36415 COLL VENOUS BLD VENIPUNCTURE: CPT

## 2024-09-20 PROCEDURE — 71275 CT ANGIOGRAPHY CHEST: CPT

## 2024-09-20 PROCEDURE — 84484 ASSAY OF TROPONIN QUANT: CPT | Performed by: NURSE PRACTITIONER

## 2024-09-20 PROCEDURE — 93005 ELECTROCARDIOGRAM TRACING: CPT | Performed by: EMERGENCY MEDICINE

## 2024-09-20 PROCEDURE — 93005 ELECTROCARDIOGRAM TRACING: CPT

## 2024-09-20 RX ORDER — IOPAMIDOL 755 MG/ML
85 INJECTION, SOLUTION INTRAVASCULAR
Status: COMPLETED | OUTPATIENT
Start: 2024-09-20 | End: 2024-09-20

## 2024-09-20 RX ORDER — SODIUM CHLORIDE 0.9 % (FLUSH) 0.9 %
10 SYRINGE (ML) INJECTION AS NEEDED
Status: DISCONTINUED | OUTPATIENT
Start: 2024-09-20 | End: 2024-09-20 | Stop reason: HOSPADM

## 2024-09-20 RX ADMIN — SODIUM CHLORIDE 1000 ML: 9 INJECTION, SOLUTION INTRAVENOUS at 11:53

## 2024-09-20 RX ADMIN — IOPAMIDOL 85 ML: 755 INJECTION, SOLUTION INTRAVENOUS at 13:13

## 2024-09-20 NOTE — ED PROVIDER NOTES
EMERGENCY DEPARTMENT ENCOUNTER    Pt Name: Emily Heller  MRN: [id number]  Pt :   1970  Room Number:  Room/bed info not found  Date of encounter:  2024  PCP: Rubens Bass APRN  ED Provider: VALENCIA Adams    Historian: Patient    HPI:  Chief Complaint:    Context: Emily Heller is a 54 y.o. female who presents to the ED c/o ***  HPI     REVIEW OF SYSTEMS  A chief complaint appropriate review of systems was completed and is negative except as noted in the HPI.     PAST MEDICAL HISTORY  Past Medical History:   Diagnosis Date   • Abnormal Pap smear of cervix    • ADD (attention deficit disorder)    • Alcoholism    • Anxiety    • Arthritis    • Asthma    • Bipolar disorder    • Cancer Nasal cell carcinoma   • Carpal tunnel syndrome    • Depression    • Diabetes    • Encounter for long-term (current) use of other medications 2022   • Fibroid 2022   • Gestational hypertension 2001   • H/O cold sores    • Hyperlipidemia    • Hypertension    • Kidney failure    • Leukocytosis    • Migraine    • Obesity    • PCOS (polycystic ovarian syndrome)    • Urinary tract infection        PAST SURGICAL HISTORY  Past Surgical History:   Procedure Laterality Date   • CERVICAL BIOPSY  W/ LOOP ELECTRODE EXCISION     • CHOLECYSTECTOMY     • COLONOSCOPY  2023   • DILATATION AND CURETTAGE  10/6/22   • GANGLION CYST EXCISION     • LEEP     • REDUCTION MAMMAPLASTY     • WISDOM TOOTH EXTRACTION  1986       FAMILY HISTORY  Family History   Problem Relation Age of Onset   • Lung cancer Mother    • Hyperlipidemia Mother    • Depression Mother    • Cancer Mother         Lung Cancer & Skin Cancer   • Hypertension Mother    • Hyperlipidemia Father    • Hypertension Father    • Colon cancer Maternal Uncle    • Breast cancer Paternal Aunt         age unknown   • Breast cancer Paternal Aunt 50   • Liver disease Maternal Aunt         Non-Alcohol related cirrhosis   • Ovarian  cancer Neg Hx        SOCIAL HISTORY  Social History     Socioeconomic History   • Marital status:    Tobacco Use   • Smoking status: Former     Current packs/day: 0.00     Average packs/day: 1 pack/day for 15.7 years (15.7 ttl pk-yrs)     Types: Cigarettes     Start date: 1987     Quit date: 2003     Years since quittin.7     Passive exposure: Past   • Smokeless tobacco: Never   Vaping Use   • Vaping status: Never Used   Substance and Sexual Activity   • Alcohol use: Yes     Comment: May have a glass of wine or a beer once every 3 or 4 months   • Drug use: Never   • Sexual activity: Yes     Partners: Male     Birth control/protection: Vasectomy       ALLERGIES  Morphine, Penicillins, and Flagyl [metronidazole]    PHYSICAL EXAM  Physical Exam  ***    LAB RESULTS  Results for orders placed or performed during the hospital encounter of 24   ECG 12 Lead Chest Pain   Result Value Ref Range    QT Interval 370 ms    QTC Interval 440 ms       If labs were ordered, I independently reviewed the results and considered them in treating the patient.    RADIOLOGY  No orders to display     [] Radiologist's Report Reviewed:  I ordered and independently interpreted the above noted radiographic studies.  See radiologist's dictation for official interpretation.      PROCEDURES    Procedures    ECG 12 Lead Chest Pain   Preliminary Result   Test Reason : Chest Pain   Blood Pressure :   */*   mmHG   Vent. Rate :  85 BPM     Atrial Rate :  85 BPM      P-R Int : 130 ms          QRS Dur :  86 ms       QT Int : 370 ms       P-R-T Axes :  43  -4  34 degrees      QTc Int : 440 ms      Normal sinus rhythm   Inferior infarct , age undetermined   Abnormal ECG   When compared with ECG of 04-OCT-2022 10:57,   No significant change was found      Referred By: ED MD           Confirmed By:           MEDICATIONS GIVEN IN ER    Medications - No data to display    MEDICAL DECISION MAKING, PROGRESS, and CONSULTS   Medical  Decision Making  Amount and/or Complexity of Data Reviewed  ECG/medicine tests: ordered.        Discussion below represents my analysis of pertinent findings related to patient's condition, differential diagnosis, treatment plan and final disposition.    Differential diagnosis:  The differential diagnosis associated with the patient's presentation includes: ***    Additional sources  Discussed/ obtained information from independent historians:   [] Spouse  [] Parent  [] Family member  [] Friend  [] EMS   [] Other:  External (non-ED) record review:   [] Inpatient record:   [] Office record:   [] Outpatient record:   [] Prior Outpatient labs:   [] Prior Outpatient radiology:   [] Primary Care record:   [] Outside ED record:   [] Other:   Patient's care impacted by:   [] Diabetes  [] Hypertension  [] Hyperlipidemia  [] Hypothyroidism   [] Coronary Artery Disease   [] COPD   [] Cancer   [] Obesity  [] GERD   [] Tobacco Abuse   [] Substance Abuse    [] Anxiety   [] Depression   [] Other:   Care significantly affected by Social Determinants of Health (housing and economic circumstances, unemployment)    [] Yes     [] No   If yes, Patient's care significantly limited by  Social Determinants of Health including:   [] Inadequate housing   [] Low income   [] Alcoholism and drug addiction in family   [] Problems related to primary support group   [] Unemployment   [] Problems related to employment   [] Other Social Determinants of Health:   Shared decision making:    Orders placed during this visit:  Orders Placed This Encounter   Procedures   • ECG 12 Lead Chest Pain       I considered prescription management  with:   [] Pain medication  [] Antiviral  [] Antibiotic   [] Other:   Rationale:  Additional orders considered but not ordered:  The following testing was considered but ultimately not selected after discussion with patient/family:***    ED Course:    ED Course as of 09/20/24 1114   Fri Sep 20, 2024   1056    Interpretation Summary       ·  Left ventricular ejection fraction is hyperdynamic (Calculated EF > 70%).  ·  The exercise ECG portion of the stress test was positive for reproducible anginal-like chest discomfort with exercise.  No significant EKG changes.  The Duke treadmill score was 1.1.  There was a mildly decreased exercise capacity.  ·  The myocardial perfusion imaging portion of the stress test indicates a normal myocardial perfusion study with no evidence of ischemia. Mild apical thinning fixed defect noted after attenuation correction.  ·  Impressions are consistent with a low risk study.   [KG]      ED Course User Index  [KG] Criselda Mahoney APRN            DIAGNOSIS  Final diagnoses:   None       DISPOSITION    ED Disposition       None            Please note that portions of this document were completed with voice recognition software.     Seen 0 - 6 /LPF    Methodology Automated Microscopy    High Sensitivity Troponin T 2Hr    Specimen: Blood   Result Value Ref Range    HS Troponin T <6 <14 ng/L    Troponin T Delta     ECG 12 Lead Chest Pain   Result Value Ref Range    QT Interval 370 ms    QTC Interval 440 ms       If labs were ordered, I independently reviewed the results and considered them in treating the patient.    RADIOLOGY  CT Head Without Contrast   Final Result   Impression:   No acute intracranial process identified.            Electronically Signed: Kevin Deleon MD     9/20/2024 1:19 PM EDT     Workstation ID: [id number]      CT Angiogram Chest   Final Result   1.No acute abnormality is identified within the thorax. Specifically, there is no evidence of acute pulmonary embolism.   2.Posterior mediastinal cyst or mass measures 4.3 x 3.1 x 3.4 cm. Primary considerations include esophageal duplication cyst or neoplastic mass. Contrast-enhanced MRI may be useful for further characterization.   3.Partially visualized low-density nodule of the right adrenal gland. Suspect an adenoma. If clinically indicated, this may be further evaluated with adrenal protocol CT.            Electronically Signed: Bryce Navarrete MD     9/20/2024 1:28 PM EDT     Workstation ID: [id number]      XR Chest 1 View   Final Result   Impression:   No evidence of active chest disease.         Electronically Signed: Nasim Vargas MD     9/20/2024 1:00 PM EDT     Workstation ID: [id number]        [] Radiologist's Report Reviewed:  I ordered and independently interpreted the above noted radiographic studies.  See radiologist's dictation for official interpretation.      PROCEDURES    Procedures    ECG 12 Lead Chest Pain   Final Result   Test Reason : Chest Pain   Blood Pressure :   */*   mmHG   Vent. Rate :  85 BPM     Atrial Rate :  85 BPM      P-R Int : 130 ms          QRS Dur :  86 ms       QT Int : 370 ms       P-R-T Axes :  43  -4  34 degrees      QTc Int : 440 ms       Normal sinus rhythm   Possible prior infarct, no evidence of acute infarct   Abnormal ECG   When compared with ECG of 04-OCT-2022 10:57,   No significant change was found   Confirmed by MARY GRACE ALEGRE MD ([id number]) on 9/21/2024 2:47:48 PM      Referred By: ED MD           Confirmed By: MARY GRACE ALEGRE MD          MEDICATIONS GIVEN IN ER    Medications   sodium chloride 0.9 % bolus 1,000 mL (0 mL Intravenous Stopped 9/20/24 1545)   iopamidol (ISOVUE-370) 76 % injection 85 mL (85 mL Intravenous Given 9/20/24 1313)       MEDICAL DECISION MAKING, PROGRESS, and CONSULTS   Medical Decision Making  Emily Heller is a 54 y.o. female who presents to the ED c/o dizziness.  Patient complains of dizziness it has been off and on since Sunday.  She reports that her blood pressure has been on the low side.  She reports has been as low as 83/46.  She complains of feeling hot and sweaty.  Patient reports that she felt lightheaded today.  She said pain in her upper back that radiates through to her chest.  Patient has a history of hypertension, diabetes, depression, hyperlipidemia.  Patient reports that she had a stress test a year ago that was within normal limits.      Problems Addressed:  Chest mass: complicated acute illness or injury     Details: Patient has a chest mass.  I did contact GI who evaluated patient's imaging.  We contacted CT surgery.  Patient scheduled to see CT surgery as outpatient.  Chest pain, unspecified type: complicated acute illness or injury     Details: Patient's had 2 sets of normal cardiac enzymes.  Incidental finding is a chest mass.  Patient to follow-up with CT surgery.    Amount and/or Complexity of Data Reviewed  Labs: ordered. Decision-making details documented in ED Course.  Radiology: ordered.  ECG/medicine tests: ordered.    Risk  Prescription drug management.        Discussion below represents my analysis of pertinent findings related to patient's condition, differential diagnosis, treatment  plan and final disposition.    Differential diagnosis:  The differential diagnosis associated with the patient's presentation includes: Chest pain, MI, electrolyte imbalance, pneumonia, arrhythmia    Additional sources  Discussed/ obtained information from independent historians:   [] Spouse  [] Parent  [] Family member  [] Friend  [] EMS   [] Other:  External (non-ED) record review:   [] Inpatient record:   [] Office record:   [x] Outpatient record:   [x] Prior Outpatient labs:   [x] Prior Outpatient radiology:   [] Primary Care record:   [] Outside ED record:   [] Other:   Patient's care impacted by:   [x] Diabetes  [x] Hypertension  [x] Hyperlipidemia  [] Hypothyroidism   [] Coronary Artery Disease   [] COPD   [] Cancer   [] Obesity  [] GERD   [] Tobacco Abuse   [] Substance Abuse    [] Anxiety   [x] Depression   [] Other:   Care significantly affected by Social Determinants of Health (housing and economic circumstances, unemployment)    [] Yes     [x] No   If yes, Patient's care significantly limited by  Social Determinants of Health including:   [] Inadequate housing   [] Low income   [] Alcoholism and drug addiction in family   [] Problems related to primary support group   [] Unemployment   [] Problems related to employment   [] Other Social Determinants of Health:   Shared decision making: Shared decision making with patient and family cardiac workup is within normal limits at today's visit.  Imaging did reveal a chest mass.  We will refer patient to CT surgery for follow-up.  I did contact Dr. Martin.  He will see patient in the office on the 26th.  We will also refer the patient to the chest pain clinic.  Patient agrees with our treatment plan and verbalized understanding.    Orders placed during this visit:  Orders Placed This Encounter   Procedures    COVID PRE-OP / PRE-PROCEDURE SCREENING ORDER (NO ISOLATION) - Swab, Nasopharynx    COVID-19 and FLU A/B PCR, 1 HR TAT - Swab, Nasopharynx    XR Chest 1  View    CT Head Without Contrast    CT Angiogram Chest    Comprehensive Metabolic Panel    Urinalysis With Microscopic If Indicated (No Culture) - Urine, Clean Catch    Lipase    High Sensitivity Troponin T    CBC Auto Differential    Urinalysis, Microscopic Only - Urine, Clean Catch    High Sensitivity Troponin T 2Hr    Ambulatory Referral to [facility]    ECG 12 Lead Chest Pain    CBC & Differential       I considered prescription management  with:   [] Pain medication  [] Antiviral  [] Antibiotic   [] Other:   Rationale:  Additional orders considered but not ordered:  The following testing was considered but ultimately not selected after discussion with patient/family:    ED Course:    ED Course as of 09/30/24 2240   Fri Sep 20, 2024   1056   Interpretation Summary       ·  Left ventricular ejection fraction is hyperdynamic (Calculated EF > 70%).  ·  The exercise ECG portion of the stress test was positive for reproducible anginal-like chest discomfort with exercise.  No significant EKG changes.  The Duke treadmill score was 1.1.  There was a mildly decreased exercise capacity.  ·  The myocardial perfusion imaging portion of the stress test indicates a normal myocardial perfusion study with no evidence of ischemia. Mild apical thinning fixed defect noted after attenuation correction.  ·  Impressions are consistent with a low risk study.   [KG]   1324 Lipase(!): 74 [KG]   1324 WBC(!): 13.89 [KG]   1324 Hemoglobin(!): 10.8 [KG]   1324 Hematocrit(!): 32.2 [KG]   1401 HS Troponin T: 7 [KG]   1431 CT of the head reviewed negative for acute findings. [KG]   1431 Chest x-ray reviewed negative for acute findings. [KG]   1439 Dr. Morrison contacted regarding patient  [KG]      ED Course User Index  [KG] Criselda Mahoney, VALENCIA            DIAGNOSIS  Final diagnoses:   Chest pain, unspecified type   Chest mass       DISPOSITION    DISCHARGE    Patient discharged in stable condition.    Reviewed implications of  results, diagnosis, meds, responsibility to follow up, warning signs and symptoms of possible worsening, potential complications and reasons to return to ER.    Patient/Family voiced understanding of above instructions.    Discussed plan for discharge, as there is no emergent indication for admission.  Pt/family is agreeable and understands need for follow up and possible repeat testing.  Pt/family is aware that discharge does not mean that nothing is wrong but that it indicates no emergency is currently present that requires admission and they must continue care with follow-up as given below or with a physician of their choice.     FOLLOW-UP  Rubens Bass, APRN  [address] [facility] [address]  [phone]          Simon Martin MD  [address] [facility]  [address]  [place] [address]  [phone]    On 9/26/2024  appt at 1030    Brunner, Mark I, MD  [address] [facility]  [address]  [place] [facility] [address]  [phone]          Stephan Higginbotham MD  [address] [facility]  [address]  [place] [id number]  [phone]          [facility] CARDIOLOGY  [address] [facility]  [address]  [facility] [id number] [phone]             Medication List      No changes were made to your prescriptions during this visit.          ED Disposition       ED Disposition   Discharge    Condition   Stable    Comment   --               Please note that portions of this document were completed with voice recognition software.       Criselda Mahoney, APRN  09/30/24 [id number]

## 2024-09-21 LAB
QT INTERVAL: 370 MS
QTC INTERVAL: 440 MS

## 2024-09-23 ENCOUNTER — HOSPITAL ENCOUNTER (OUTPATIENT)
Dept: MAMMOGRAPHY | Facility: HOSPITAL | Age: 54
Discharge: HOME | End: 2024-09-23
Admitting: OBSTETRICS & GYNECOLOGY
Payer: COMMERCIAL

## 2024-09-23 DIAGNOSIS — Z12.31 SCREENING MAMMOGRAM FOR BREAST CANCER: ICD-10-CM

## 2024-09-23 LAB
NCCN CRITERIA FLAG: NORMAL
TYRER CUZICK SCORE: 11.6

## 2024-09-23 PROCEDURE — 77067 SCR MAMMO BI INCL CAD: CPT

## 2024-09-23 PROCEDURE — 77063 BREAST TOMOSYNTHESIS BI: CPT

## 2024-09-24 PROBLEM — Z00.00 ANNUAL PHYSICAL EXAM: Status: RESOLVED | Noted: 2024-02-13 | Resolved: 2024-09-24

## 2024-09-25 ENCOUNTER — OFFICE VISIT (OUTPATIENT)
Dept: CARDIOLOGY | Facility: HOSPITAL | Age: 54
End: 2024-09-25
Payer: COMMERCIAL

## 2024-09-25 ENCOUNTER — OFFICE VISIT (OUTPATIENT)
Dept: OBSTETRICS AND GYNECOLOGY | Facility: CLINIC | Age: 54
End: 2024-09-25
Payer: COMMERCIAL

## 2024-09-25 VITALS
HEIGHT: 65 IN | OXYGEN SATURATION: 99 % | WEIGHT: 218 LBS | HEART RATE: 86 BPM | DIASTOLIC BLOOD PRESSURE: 64 MMHG | SYSTOLIC BLOOD PRESSURE: 108 MMHG | BODY MASS INDEX: 36.32 KG/M2

## 2024-09-25 VITALS
HEIGHT: 65 IN | BODY MASS INDEX: 36.42 KG/M2 | DIASTOLIC BLOOD PRESSURE: 86 MMHG | WEIGHT: 218.6 LBS | SYSTOLIC BLOOD PRESSURE: 130 MMHG

## 2024-09-25 DIAGNOSIS — Z01.419 ENCOUNTER FOR ANNUAL ROUTINE GYNECOLOGICAL EXAMINATION: Primary | ICD-10-CM

## 2024-09-25 DIAGNOSIS — R07.89 OTHER CHEST PAIN: Primary | ICD-10-CM

## 2024-09-25 DIAGNOSIS — R06.09 DYSPNEA ON EXERTION: ICD-10-CM

## 2024-09-25 DIAGNOSIS — R55 NEAR SYNCOPE: ICD-10-CM

## 2024-09-25 DIAGNOSIS — Z87.42 HISTORY OF ABNORMAL CERVICAL PAPANICOLAOU SMEAR: ICD-10-CM

## 2024-09-25 DIAGNOSIS — N95.1 PERIMENOPAUSAL SYMPTOMS: ICD-10-CM

## 2024-09-25 PROCEDURE — 99396 PREV VISIT EST AGE 40-64: CPT | Performed by: OBSTETRICS & GYNECOLOGY

## 2024-09-25 PROCEDURE — 99214 OFFICE O/P EST MOD 30 MIN: CPT | Performed by: NURSE PRACTITIONER

## 2024-09-26 ENCOUNTER — OFFICE VISIT (OUTPATIENT)
Dept: CARDIAC SURGERY | Facility: CLINIC | Age: 54
End: 2024-09-26
Payer: COMMERCIAL

## 2024-09-26 VITALS
HEIGHT: 65 IN | DIASTOLIC BLOOD PRESSURE: 82 MMHG | BODY MASS INDEX: 36.19 KG/M2 | TEMPERATURE: 98.3 F | HEART RATE: 90 BPM | OXYGEN SATURATION: 99 % | WEIGHT: 217.2 LBS | SYSTOLIC BLOOD PRESSURE: 142 MMHG

## 2024-09-26 DIAGNOSIS — J98.59 MEDIASTINAL MASS: Primary | ICD-10-CM

## 2024-09-26 LAB
ESTRADIOL SERPL-MCNC: 169 PG/ML
FSH SERPL-ACNC: 16.1 MIU/ML
PROGEST SERPL-MCNC: 0.1 NG/ML

## 2024-10-07 LAB — REF LAB TEST METHOD: NORMAL

## 2024-10-18 ENCOUNTER — TELEPHONE (OUTPATIENT)
Dept: CARDIAC SURGERY | Facility: CLINIC | Age: 54
End: 2024-10-18
Payer: COMMERCIAL

## 2024-10-18 NOTE — TELEPHONE ENCOUNTER
Patient called to verify if records was sent to [facility]. She has current appt with [facility] on Tuesday 10/22/24.     [facility] should be able to view records via [facility] everywhere. Images have been requested to be powershared.

## 2024-10-20 DIAGNOSIS — R45.4 ANGER: ICD-10-CM

## 2024-10-20 DIAGNOSIS — F32.9 MAJOR DEPRESSIVE DISORDER WITH CURRENT ACTIVE EPISODE, UNSPECIFIED DEPRESSION EPISODE SEVERITY, UNSPECIFIED WHETHER RECURRENT: ICD-10-CM

## 2024-10-21 ENCOUNTER — TELEPHONE (OUTPATIENT)
Dept: FAMILY MEDICINE CLINIC | Facility: CLINIC | Age: 54
End: 2024-10-21
Payer: COMMERCIAL

## 2024-10-21 RX ORDER — LAMOTRIGINE 200 MG/1
200 TABLET ORAL DAILY
Qty: 30 TABLET | Refills: 0 | Status: SHIPPED | OUTPATIENT
Start: 2024-10-21

## 2024-10-21 NOTE — TELEPHONE ENCOUNTER
Hub relay: left message patient's medication has been refilled and they need to schedule a medication recheck with Rubens

## 2024-10-23 ENCOUNTER — OFFICE VISIT (OUTPATIENT)
Dept: OBSTETRICS AND GYNECOLOGY | Facility: CLINIC | Age: 54
End: 2024-10-23
Payer: COMMERCIAL

## 2024-10-23 VITALS
BODY MASS INDEX: 35.82 KG/M2 | WEIGHT: 215 LBS | DIASTOLIC BLOOD PRESSURE: 68 MMHG | HEIGHT: 65 IN | SYSTOLIC BLOOD PRESSURE: 112 MMHG

## 2024-10-23 DIAGNOSIS — N83.202 LEFT OVARIAN CYST: ICD-10-CM

## 2024-10-23 DIAGNOSIS — N93.9 ABNORMAL UTERINE BLEEDING (AUB): ICD-10-CM

## 2024-10-23 DIAGNOSIS — R93.89 THICKENED ENDOMETRIUM: Primary | ICD-10-CM

## 2024-10-23 NOTE — PROGRESS NOTES
Chief Complaint   Patient presents with    Follow-up       Subjective   HPI  Emily Heller is a 54 y.o. female, . Her last LMP was Patient's last menstrual period was 10/10/2024 (exact date).. who presents for follow up on irregular menses.      Pt reported at last visit  periods are irregular, lasting 6-26 days. The flow is heavy-light. She denies dysmenorrhea. At her last visit she was treated with expectant management. Her labs did not indicate menopause.    She did have US today. Reviewed findings with multiple fibroids and complex ovarian cyst.     Since then she reports her symptoms/issue has remained unchanged. The patient reports additional symptoms as none.           Gynecologic Procedure Note        Endometrial Biopsy      Indications: Emily Heller is a 54 y.o. , who presents today for endometrial biopsy.  The patient was noted to have Abnormal Uterine Bleeding.  Her LMP is Patient's last menstrual period was 10/10/2024 (exact date). . After being presented with the risk, benefits, and specific detail of the procedure, the patient wished to proceed.  Written consent was obtained from patient.   Urine pregnancy test was Not indicated. Patient does not have an allergy to betadine or shellfish.     Procedure Details     Time out: immediate members of the procedure team and patient agree to the following: correct patient, correct site, correct procedure to be performed. Amish Leavitt MD      The patient was placed on the table in the dorsal lithotomy position.  She was draped in the appropriate manner.  A speculum was placed in the vagina.  The cervix was visualized and prepped with Betadine.  A tenaculum was placed on the anterior lip of the cervix for traction.  A small plastic 5 mm Pipelle syringe curette was inserted into the cervical canal.  The uterus was sounded to 7 cm's.  A vigorous four quadrant biopsy was performed, removing a medium amount of tissue.  " The tissue was placed in Formalin and sent to Pathology.  The patient tolerated the procedure very well and she reported mild cramping.  The tenaculum was removed from the cervix and the speculum was removed.  The patient was observed for 10 minutes.           Complications: none.     Procedures    Review of Systems   Genitourinary:  Positive for menstrual problem.     /68 (BP Location: Right arm)   Ht 165.1 cm (65\")   Wt 97.5 kg (215 lb)   LMP 10/10/2024 (Exact Date)   BMI 35.78 kg/m²       Plan:  Orders Placed This Encounter   Procedures    US Non-ob Transvaginal     Standing Status:   Future     Standing Expiration Date:   10/23/2025     Order Specific Question:   Reason for Exam:     Answer:   left ovarian cyst     Order Specific Question:   Release to patient     Answer:   Routine Release [[id number]]       Problem List Items Addressed This Visit       Thickened endometrium - Primary    Relevant Orders    TISSUE EXAM, [facility] ([facility])     Other Visit Diagnoses       Abnormal uterine bleeding (AUB)        Relevant Orders    TISSUE EXAM, [facility] ([facility])    Left ovarian cyst        Relevant Orders    US Non-ob Transvaginal                Instructions  Call the office in 5 business days for biopsy results.  Patient instructed to call the office if develops a fever of 100.4 or greater, vaginal bleeding heavier than a period, foul vaginal discharge or pain.     Amish Leavitt MD  10/23/2024       Additional OB/GYN History     Last Pap :   Last Completed Pap Smear            PAP SMEAR (Every 3 Years) Next due on 9/25/2027 09/25/2024  LIQUID-BASED PAP SMEAR WITH HPV GENOTYPING REGARDLESS OF INTERPRETATION ([facility])    09/13/2023  LIQUID-BASED PAP SMEAR WITH HPV GENOTYPING IF ASCUS ([facility])    09/09/2022  LIQUID-BASED PAP SMEAR, [facility] ([facility])    08/24/2021  SCANNED - PAP SMEAR    07/13/2020  Done - negative    Only the first 5 history entries have been loaded, but " more history exists.                    Last mammogram:   Last Completed Mammogram            MAMMOGRAM (Every 2 Years) Next due on 2024  Mammo Screening Digital Tomosynthesis Bilateral With CAD    09/15/2023  Mammo Screening Digital Tomosynthesis Bilateral With CAD    2022  Mammo Screening Digital Tomosynthesis Bilateral With CAD    2020  Mammo Screening Digital Tomosynthesis Bilateral With CAD    2019  Mammo Screening Digital Tomosynthesis Bilateral With CAD    Only the first 5 history entries have been loaded, but more history exists.                    Tobacco Usage?: No   OB History          3    Para   3    Term   3            AB        Living             SAB        IAB        Ectopic        Molar        Multiple        Live Births   3                  Current Outpatient Medications:     Alcohol Swabs pads, For use to check blood sugars and prior to giving insulin (Patient not taking: Reported on 2024), Disp: 100 each, Rfl: 2    atorvastatin (LIPITOR) 10 MG tablet, Take 1 tablet by mouth Daily., Disp: 90 tablet, Rfl: 1    Continuous Glucose Sensor (Dexcom G7 Sensor) misc, Use 1 Device Every 10 (Ten) Days. (Patient not taking: Reported on 2024), Disp: 3 each, Rfl: 11    glucose blood test strip, Use as directed daily; Accu-Chek Guide Me Strips; E11.9 (Patient not taking: Reported on 2024), Disp: 100 each, Rfl: 3    insulin degludec (TRESIBA FLEXTOUCH) 100 UNIT/ML solution pen-injector injection, Inject 34 units once daily, titrate for max daily dose of 50 units, Disp: 15 mL, Rfl: 5    Insulin Pen Needle 32G X 4 MM misc, Use 1 each Daily., Disp: 100 each, Rfl: 3    lamoTRIgine (LaMICtal) 200 MG tablet, TAKE 1 TABLET BY MOUTH DAILY, Disp: 30 tablet, Rfl: 0    Lancets (accu-chek multiclix) lancets, Check glucose tid and prn; Accu-Chek Guide Me lancets; E11.9, Disp: 100 each, Rfl: 5    linaclotide (Linzess) 290 MCG capsule capsule, Take 1 capsule  by mouth Every Morning Before Breakfast. Take 30 minutes before first daily meal., Disp: 30 capsule, Rfl: 12    lisinopril (PRINIVIL,ZESTRIL) 10 MG tablet, Take 1 tablet by mouth Daily., Disp: 90 tablet, Rfl: 1    metFORMIN ER (GLUCOPHAGE-XR) 500 MG 24 hr tablet, Take 2 tablets by mouth 2 (Two) Times a Day With Meals., Disp: 360 tablet, Rfl: 1    Ozempic, 2 MG/DOSE, 8 MG/3ML solution pen-injector, DIAL AND INJECT UNDER THE SKIN 2 MG WEEKLY, Disp: 3 mL, Rfl: 2    spironolactone (ALDACTONE) 25 MG tablet, TAKE 1 TABLET BY MOUTH DAILY, Disp: 90 tablet, Rfl: 0    valACYclovir (VALTREX) 1000 MG tablet, TAKE TWO TABLETS BY MOUTH EVERY 12 HOURS FOR ONE DAY AS NEEDED FOR COLD SORE. START AS SOON AS POSSIBLE AFTER ONSET OF SYMPTOMS, Disp: 20 tablet, Rfl: 2     Past Medical History:   Diagnosis Date    Abnormal Pap smear of cervix 1996    ADD (attention deficit disorder)     Alcoholism     Anxiety     Arthritis     Asthma     Bipolar disorder     Cancer Nasal cell carcinoma    Carpal tunnel syndrome     Depression     Diabetes     Encounter for long-term (current) use of other medications 09/23/2022    Fibroid 9/2022    Gestational hypertension 4/1/2001    H/O cold sores     Hyperlipidemia     Hypertension     Kidney failure     Leukocytosis     Migraine 1995    Obesity     PCOS (polycystic ovarian syndrome)     Sleep apnea 2004    Urinary tract infection         Past Surgical History:   Procedure Laterality Date    CERVICAL BIOPSY  W/ LOOP ELECTRODE EXCISION  1996    CHOLECYSTECTOMY      COLONOSCOPY  12/2023    DILATATION AND CURETTAGE  10/6/22    GANGLION CYST EXCISION      LEEP      REDUCTION MAMMAPLASTY  2003    WISDOM TOOTH EXTRACTION  1986       The additional following portions of the patient's history were reviewed and updated as appropriate: allergies, current medications, past family history, past medical history, past social history, past surgical history, and problem list.    Review of Systems   Genitourinary:   "Positive for menstrual problem.       I have reviewed and agree with the HPI, ROS, and historical information as entered above. Amish Leavitt MD      Objective   /68 (BP Location: Right arm)   Ht 165.1 cm (65\")   Wt 97.5 kg (215 lb)   LMP 10/10/2024 (Exact Date)   BMI 35.78 kg/m²     Physical Exam    Assessment & Plan     Assessment     Problem List Items Addressed This Visit       Thickened endometrium - Primary    Relevant Orders    TISSUE EXAM, [facility] ([facility])     Other Visit Diagnoses       Abnormal uterine bleeding (AUB)        Relevant Orders    TISSUE EXAM, [facility] ([facility])    Left ovarian cyst        Relevant Orders    US Non-ob Transvaginal            Abnormal uterine bleeding  Uterine fibroids  Left ovarian cyst    Plan     EMB performed today.  Discussed treatment of symptomatic uterine fibroids and will proceed with TLH/BSO.  Risks of surgery explained including bleeding, infection, damage to internal organs, need for additional surgery.   Will repeat US for reevaluation of left ovarian cyst in 6-8 weeks.   Return in about 6 weeks (around 12/4/2024) for GYN visit and US.        Amish Leavitt MD  10/23/2024    "

## 2024-10-24 ENCOUNTER — PREP FOR SURGERY (OUTPATIENT)
Dept: OTHER | Facility: HOSPITAL | Age: 54
End: 2024-10-24
Payer: COMMERCIAL

## 2024-10-24 DIAGNOSIS — N92.1 MENORRHAGIA WITH IRREGULAR CYCLE: Primary | ICD-10-CM

## 2024-10-24 LAB — REF LAB TEST METHOD: NORMAL

## 2024-10-24 RX ORDER — SODIUM CHLORIDE 0.9 % (FLUSH) 0.9 %
10 SYRINGE (ML) INJECTION AS NEEDED
OUTPATIENT
Start: 2024-10-24

## 2024-10-24 RX ORDER — CLINDAMYCIN PHOSPHATE 900 MG/50ML
900 INJECTION, SOLUTION INTRAVENOUS ONCE
OUTPATIENT
Start: 2024-10-24 | End: 2024-10-24

## 2024-10-24 RX ORDER — ACETAMINOPHEN 500 MG
1000 TABLET ORAL ONCE
OUTPATIENT
Start: 2024-10-24 | End: 2024-10-24

## 2024-10-24 RX ORDER — GABAPENTIN 300 MG/1
600 CAPSULE ORAL ONCE
OUTPATIENT
Start: 2024-10-24 | End: 2024-10-24

## 2024-10-24 RX ORDER — SODIUM CHLORIDE 0.9 % (FLUSH) 0.9 %
3 SYRINGE (ML) INJECTION EVERY 12 HOURS SCHEDULED
OUTPATIENT
Start: 2024-10-24

## 2024-10-24 RX ORDER — SCOLOPAMINE TRANSDERMAL SYSTEM 1 MG/1
1 PATCH, EXTENDED RELEASE TRANSDERMAL CONTINUOUS
OUTPATIENT
Start: 2024-10-24 | End: 2024-10-27

## 2024-10-24 RX ORDER — SODIUM CHLORIDE 9 MG/ML
40 INJECTION, SOLUTION INTRAVENOUS AS NEEDED
OUTPATIENT
Start: 2024-10-24 | End: 2024-10-25

## 2024-11-13 DIAGNOSIS — I10 BENIGN ESSENTIAL HTN: ICD-10-CM

## 2024-11-13 RX ORDER — SPIRONOLACTONE 25 MG/1
25 TABLET ORAL DAILY
Qty: 90 TABLET | Refills: 0 | Status: SHIPPED | OUTPATIENT
Start: 2024-11-13

## 2024-11-20 ENCOUNTER — OFFICE VISIT (OUTPATIENT)
Dept: FAMILY MEDICINE CLINIC | Facility: CLINIC | Age: 54
End: 2024-11-20
Payer: COMMERCIAL

## 2024-11-20 VITALS
BODY MASS INDEX: 35.05 KG/M2 | SYSTOLIC BLOOD PRESSURE: 122 MMHG | WEIGHT: 210.38 LBS | OXYGEN SATURATION: 98 % | HEIGHT: 65 IN | HEART RATE: 93 BPM | DIASTOLIC BLOOD PRESSURE: 70 MMHG

## 2024-11-20 DIAGNOSIS — D72.829 LEUKOCYTOSIS, UNSPECIFIED TYPE: ICD-10-CM

## 2024-11-20 DIAGNOSIS — E78.2 MIXED HYPERLIPIDEMIA: ICD-10-CM

## 2024-11-20 DIAGNOSIS — R82.90 NONSPECIFIC FINDING ON EXAMINATION OF URINE: ICD-10-CM

## 2024-11-20 DIAGNOSIS — I10 BENIGN ESSENTIAL HTN: ICD-10-CM

## 2024-11-20 DIAGNOSIS — E11.65 TYPE 2 DIABETES MELLITUS WITH HYPERGLYCEMIA, WITH LONG-TERM CURRENT USE OF INSULIN: ICD-10-CM

## 2024-11-20 DIAGNOSIS — Z23 IMMUNIZATION DUE: ICD-10-CM

## 2024-11-20 DIAGNOSIS — J30.2 SEASONAL ALLERGIC RHINITIS, UNSPECIFIED TRIGGER: ICD-10-CM

## 2024-11-20 DIAGNOSIS — Z79.4 TYPE 2 DIABETES MELLITUS WITH HYPERGLYCEMIA, WITH LONG-TERM CURRENT USE OF INSULIN: ICD-10-CM

## 2024-11-20 DIAGNOSIS — R42 DIZZINESS: Primary | ICD-10-CM

## 2024-11-20 DIAGNOSIS — F32.9 MAJOR DEPRESSIVE DISORDER WITH CURRENT ACTIVE EPISODE, UNSPECIFIED DEPRESSION EPISODE SEVERITY, UNSPECIFIED WHETHER RECURRENT: ICD-10-CM

## 2024-11-20 DIAGNOSIS — R45.4 ANGER: ICD-10-CM

## 2024-11-20 DIAGNOSIS — E56.9 VITAMIN DEFICIENCY: ICD-10-CM

## 2024-11-20 PROBLEM — J30.9 ALLERGIC RHINITIS: Status: ACTIVE | Noted: 2024-11-20

## 2024-11-20 LAB
BILIRUB BLD-MCNC: NEGATIVE MG/DL
CLARITY, POC: ABNORMAL
COLOR UR: YELLOW
EXPIRATION DATE: ABNORMAL
GLUCOSE UR STRIP-MCNC: NEGATIVE MG/DL
KETONES UR QL: NEGATIVE
LEUKOCYTE EST, POC: NEGATIVE
Lab: ABNORMAL
NITRITE UR-MCNC: NEGATIVE MG/ML
PH UR: 6 [PH] (ref 5–8)
PROT UR STRIP-MCNC: ABNORMAL MG/DL
RBC # UR STRIP: ABNORMAL /UL
SP GR UR: 1.01 (ref 1–1.03)
UROBILINOGEN UR QL: NORMAL

## 2024-11-20 PROCEDURE — 81003 URINALYSIS AUTO W/O SCOPE: CPT | Performed by: NURSE PRACTITIONER

## 2024-11-20 PROCEDURE — 90471 IMMUNIZATION ADMIN: CPT | Performed by: NURSE PRACTITIONER

## 2024-11-20 PROCEDURE — 99214 OFFICE O/P EST MOD 30 MIN: CPT | Performed by: NURSE PRACTITIONER

## 2024-11-20 PROCEDURE — 90715 TDAP VACCINE 7 YRS/> IM: CPT | Performed by: NURSE PRACTITIONER

## 2024-11-20 RX ORDER — LISINOPRIL AND HYDROCHLOROTHIAZIDE 10; 12.5 MG/1; MG/1
1 TABLET ORAL DAILY
COMMUNITY
Start: 2024-10-20

## 2024-11-20 RX ORDER — LEVOCETIRIZINE DIHYDROCHLORIDE 5 MG/1
5 TABLET, FILM COATED ORAL EVERY EVENING
Qty: 30 TABLET | Refills: 2 | Status: SHIPPED | OUTPATIENT
Start: 2024-11-20

## 2024-11-20 RX ORDER — FLUTICASONE PROPIONATE 50 MCG
2 SPRAY, SUSPENSION (ML) NASAL DAILY
Qty: 16 G | Refills: 2 | Status: SHIPPED | OUTPATIENT
Start: 2024-11-20

## 2024-11-20 RX ORDER — LAMOTRIGINE 200 MG/1
200 TABLET ORAL DAILY
Qty: 30 TABLET | Refills: 0 | Status: SHIPPED | OUTPATIENT
Start: 2024-11-20

## 2024-11-20 NOTE — PROGRESS NOTES
Chief Complaint  Diabetes, Hypertension, and Hyperlipidemia    Subjective          Emily Heller presents to [facility]  Diabetes  Hypoglycemia symptoms include dizziness. Pertinent negatives for hypoglycemia include no confusion, seizures, speech difficulty or tremors. Pertinent negatives for diabetes include no fatigue and no weakness.   Hypertension  Pertinent negatives include no shortness of breath.   Hyperlipidemia  Pertinent negatives include no shortness of breath.     History of Present Illness  The patient is a 54-year-old female who presents for evaluation of dizziness.    She has been experiencing dizzy spells since September 2024, which led to an ER visit. She describes her current dizziness as different from her previous vertigo, feeling as if her head is spinning.  States she does have some ear issues at times.  No pain.  Slight fullness in her ears at times.  No passing out.  No vision issues.  No chest pain no chest pressure.  States she is continuing to follow-up with cardiology.  States that they were placing orders for carotid ultrasound so she states that this is already in the process show she will call today to figure out if that has been scheduled.    She underwent an esophageal ultrasound at the [facility], which revealed a cyst compressing her esophagus. The origin of the cyst, whether from the esophagus or adjacent to it, remains uncertain. The cyst is located on the left side, near her heart and aorta. A CT scan of her head without contrast showed no abnormalities. A subsequent CT scan with contrast was performed to rule out a blood clot in her lungs, as she was experiencing back pain at the time.  She states they are just continuing to monitor the mass and states they will be doing a repeat scan of her chest in a month or so with [facility].     She does not use nasal sprays or allergy medications but experiences frequent sneezing fits,  "especially at her office.    She has a history of poor A1c levels, with a reading of 6.4 in July 2024. She has not made significant changes to her diet and continues to struggle with sugar intake.  She does continue to follow-up with her endocrinologist regarding her diabetes.    She has a hysterectomy scheduled for December 26, 2024 with Dr. Leavitt.    SOCIAL HISTORY  She quit smoking 31 years ago.    IMMUNIZATIONS  Denies immunizations except for tetanus shot.  She would like a Tdap today.    Objective   Vital Signs:   /70   Pulse 93   Ht 165.1 cm (65\")   Wt 95.4 kg (210 lb 6 oz)   SpO2 98%   BMI 35.01 kg/m²     Body mass index is 35.01 kg/m².    Review of Systems   Constitutional:  Negative for chills, fatigue and fever.   HENT:  Positive for postnasal drip, rhinorrhea and sneezing. Negative for congestion, ear pain, sinus pressure, sore throat, swollen glands and trouble swallowing.    Eyes:  Negative for visual disturbance.   Respiratory:  Negative for cough, shortness of breath and wheezing.    Cardiovascular: Negative.    Gastrointestinal:  Negative for abdominal pain, constipation, diarrhea, nausea and vomiting.   Genitourinary:  Negative for decreased urine volume, dysuria, frequency, hematuria and urgency.   Musculoskeletal:  Negative for back pain.   Skin:  Negative for color change, rash and wound.   Neurological:  Positive for dizziness. Negative for tremors, seizures, syncope, facial asymmetry, speech difficulty, weakness, light-headedness, numbness, headache, memory problem and confusion.   Psychiatric/Behavioral: Negative.         Past History:  Medical History: has a past medical history of Abnormal Pap smear of cervix (1996), ADD (attention deficit disorder), Alcoholism, Anxiety, Arthritis, Asthma, Bipolar disorder, Cancer (Nasal cell carcinoma), Carpal tunnel syndrome, Depression, Diabetes, Encounter for long-term (current) use of other medications (09/23/2022), Fibroid (9/2022), " Gestational hypertension (4/1/2001), H/O cold sores, Hyperlipidemia, Hypertension, Kidney failure, Leukocytosis, Migraine (1995), Obesity, PCOS (polycystic ovarian syndrome), Sleep apnea (2004), and Urinary tract infection.   Surgical History: has a past surgical history that includes Reduction mammaplasty (2003); Cholecystectomy; Ganglion cyst excision; LEEP; Dilation and curettage of uterus (10/6/22); Cervical biopsy w/ loop electrode excision (1996); Carver tooth extraction (1986); and Colonoscopy (12/2023).   Family History: family history includes Breast cancer in her paternal aunt; Breast cancer (age of onset: 50) in her paternal aunt; Cancer in her mother; Colon cancer in her maternal uncle; Depression in her mother; Heart attack in her maternal grandmother; Heart failure in her father and maternal grandmother; Hyperlipidemia in her father and mother; Hypertension in her father and mother; Liver disease in her maternal aunt; Lung cancer in her mother.   Social History: reports that she quit smoking about 21 years ago. Her smoking use included cigarettes. She started smoking about 37 years ago. She has a 16 pack-year smoking history. She has been exposed to tobacco smoke. She has never used smokeless tobacco. She reports current alcohol use. She reports that she does not use drugs.    PHQ-2 Depression Screening  Little interest or pleasure in doing things?     Feeling down, depressed, or hopeless?     PHQ-2 Total Score         PHQ-9 Depression Screening  Little interest or pleasure in doing things?     Feeling down, depressed, or hopeless?     PHQ-2 Total Score     Trouble falling or staying asleep, or sleeping too much?     Feeling tired or having little energy?     Poor appetite or overeating?     Feeling bad about yourself - or that you are a failure or have let yourself or your family down?     Trouble concentrating on things, such as reading the newspaper or watching television?     Moving or speaking so  slowly that other people could have noticed? Or the opposite - being so fidgety or restless that you have been moving around a lot more than usual?     Thoughts that you would be better off dead, or of hurting yourself in some way?     PHQ-9 Total Score     If you checked off any problems, how difficult have these problems made it for you to do your work, take care of things at home, or get along with other people?          PHQ-9 Total Score:        Patient screened positive for depression based on a PHQ-9 score of  on . Follow-up recommendations include:          Current Outpatient Medications:     Alcohol Swabs pads, For use to check blood sugars and prior to giving insulin, Disp: 100 each, Rfl: 2    atorvastatin (LIPITOR) 10 MG tablet, Take 1 tablet by mouth Daily., Disp: 90 tablet, Rfl: 1    glucose blood test strip, Use as directed daily; Accu-Chek Guide Me Strips; E11.9, Disp: 100 each, Rfl: 3    insulin degludec (TRESIBA FLEXTOUCH) 100 UNIT/ML solution pen-injector injection, Inject 34 units once daily, titrate for max daily dose of 50 units, Disp: 15 mL, Rfl: 5    Insulin Pen Needle 32G X 4 MM misc, Use 1 each Daily., Disp: 100 each, Rfl: 3    Lancets (accu-chek multiclix) lancets, Check glucose tid and prn; Accu-Chek Guide Me lancets; E11.9, Disp: 100 each, Rfl: 5    linaclotide (Linzess) 290 MCG capsule capsule, Take 1 capsule by mouth Every Morning Before Breakfast. Take 30 minutes before first daily meal., Disp: 30 capsule, Rfl: 12    lisinopril-hydrochlorothiazide (PRINZIDE,ZESTORETIC) 10-12.5 MG per tablet, Take 1 tablet by mouth Daily., Disp: , Rfl:     metFORMIN ER (GLUCOPHAGE-XR) 500 MG 24 hr tablet, Take 2 tablets by mouth 2 (Two) Times a Day With Meals., Disp: 360 tablet, Rfl: 1    Ozempic, 2 MG/DOSE, 8 MG/3ML solution pen-injector, DIAL AND INJECT UNDER THE SKIN 2 MG WEEKLY (Patient taking differently: Inject 2 mg under the skin into the appropriate area as directed 1 (One) Time Per Week.), Disp:  3 mL, Rfl: 2    spironolactone (ALDACTONE) 25 MG tablet, TAKE 1 TABLET BY MOUTH DAILY, Disp: 90 tablet, Rfl: 0    valACYclovir (VALTREX) 1000 MG tablet, TAKE TWO TABLETS BY MOUTH EVERY 12 HOURS FOR ONE DAY AS NEEDED FOR COLD SORE. START AS SOON AS POSSIBLE AFTER ONSET OF SYMPTOMS, Disp: 20 tablet, Rfl: 2    Continuous Glucose Sensor (Dexcom G7 Sensor) misc, Use 1 Device Every 10 (Ten) Days. (Patient not taking: Reported on 11/20/2024), Disp: 3 each, Rfl: 11    fluticasone (FLONASE) 50 MCG/ACT nasal spray, Administer 2 sprays into the nostril(s) as directed by provider Daily., Disp: 16 g, Rfl: 2    lamoTRIgine (LaMICtal) 200 MG tablet, TAKE 1 TABLET BY MOUTH DAILY, Disp: 30 tablet, Rfl: 0    levocetirizine (XYZAL) 5 MG tablet, Take 1 tablet by mouth Every Evening., Disp: 30 tablet, Rfl: 2    lisinopril (PRINIVIL,ZESTRIL) 10 MG tablet, Take 1 tablet by mouth Daily. (Patient not taking: Reported on 11/20/2024), Disp: 90 tablet, Rfl: 1   (Not in a hospital admission)     Allergies: Morphine, Penicillins, and Flagyl [metronidazole]    Physical Exam  Constitutional:       Appearance: Normal appearance.   HENT:      Head: Normocephalic and atraumatic.      Right Ear: Ear canal and external ear normal.      Left Ear: Ear canal and external ear normal.      Ears:      Comments: Bilateral bulging tympanic membranes.  No redness.     Nose: Nose normal.      Mouth/Throat:      Mouth: Mucous membranes are moist.      Pharynx: Oropharynx is clear.   Eyes:      Conjunctiva/sclera: Conjunctivae normal.      Pupils: Pupils are equal, round, and reactive to light.   Cardiovascular:      Rate and Rhythm: Normal rate and regular rhythm.      Heart sounds: Normal heart sounds.   Pulmonary:      Effort: Pulmonary effort is normal.      Breath sounds: Normal breath sounds.   Skin:     General: Skin is warm.      Findings: No erythema or rash.   Neurological:      General: No focal deficit present.      Mental Status: She is alert and  oriented to person, place, and time. Mental status is at baseline.      Sensory: Sensation is intact. No sensory deficit.      Motor: Motor function is intact. No weakness.      Coordination: Coordination is intact.      Gait: Gait is intact. Gait normal.   Psychiatric:         Mood and Affect: Mood normal.         Behavior: Behavior normal.         Thought Content: Thought content normal.         Judgment: Judgment normal.        Physical Exam      Result Review :          Results  Laboratory Studies  A1c was 6.4 in July.    Imaging  Esophageal ultrasound showed a cyst mildly compressing the esophagus. CT head without contrast showed normal brain and sinuses.             Assessment and Plan    Diagnoses and all orders for this visit:    1. Dizziness (Primary)  -     CBC & Differential  -     Comprehensive Metabolic Panel  -     TSH Rfx On Abnormal To Free T4  -     Magnesium  -     POC Urinalysis Dipstick, Automated; Future  -     Ambulatory Referral to ENT (Otolaryngology)  -     POC Urinalysis Dipstick, Automated    2. Seasonal allergic rhinitis, unspecified trigger  -     fluticasone (FLONASE) 50 MCG/ACT nasal spray; Administer 2 sprays into the nostril(s) as directed by provider Daily.  Dispense: 16 g; Refill: 2  -     levocetirizine (XYZAL) 5 MG tablet; Take 1 tablet by mouth Every Evening.  Dispense: 30 tablet; Refill: 2    3. Type 2 diabetes mellitus with hyperglycemia, with long-term current use of insulin  -     Hemoglobin A1c    4. Vitamin deficiency  -     Vitamin D,25-Hydroxy  -     Vitamin B12    5. Benign essential HTN    6. Leukocytosis, unspecified type  Assessment & Plan:  Labs drawn.  Continue to follow-up with hematology with Dr. Zaidi      7. Mixed hyperlipidemia  -     Lipid Panel    8. Immunization due  -     Tdap Vaccine => 6yo IM (BOOSTRIX/ADACEL); Future  -     Tdap Vaccine => 6yo IM (BOOSTRIX/ADACEL)    9. Nonspecific finding on examination of urine  -     Urine Culture - Urine, Urine,  Clean Catch      Assessment & Plan  1. Esophageal cyst.  An esophageal ultrasound at [facility] suggested a cyst mildly compressing the esophagus. The surgeon recommended monitoring the cyst for growth. Follow-up is scheduled in January to assess any changes. If the cyst grows, surgical intervention will be considered.  She knows the importance of following up with her specialist regarding this.    2. Dizziness.  She reports intermittent dizziness, distinct from previous vertigo episodes.  UA completed.  Culture sent.  Call in 2 days for results.  Return in 1 to 2 weeks for repeat UA to make sure clears.  A CT scan of the head without contrast on 09/20/2024 was normal. A small amount of fluid was noted in the ears. Comprehensive blood work will be conducted, including liver function, kidney function, cholesterol levels, A1c, and vitamin levels. An ultrasound of the carotid arteries will be ordered to rule out any vascular causes. A nasal steroid and an allergy medication will be prescribed for daily use. A referral to an ENT specialist will be made to further evaluate the dizziness.  She states her cardiologist has already ordered the carotid ultrasounds so I informed her to call them today to make sure it has been scheduled soon.  Stay hydrated with water.  Education provided on signs symptoms to monitor for.  Call after having ultrasound completed for results.  Go to ED if worsens.  Continue to follow-up regularly with her cardiologist.  Return to clinic or ED with any issues or concerns.    3. Upcoming hysterectomy.  She is scheduled for a hysterectomy on 12/26/2024.    4. Health Maintenance.  She had a mammogram recently with satisfactory results. A colonoscopy was performed in 2022. A tetanus vaccine will be administered today.  Vaccine information sheet given.  Risk discussed and understood.  Patient tolerated well.                        Follow Up   Return in about 6 months (around 5/20/2025), or if  symptoms worsen or fail to improve.  Patient was given instructions and counseling regarding her condition or for health maintenance advice. Please see specific information pulled into the AVS if appropriate.     Patient or patient representative verbalized consent for the use of Ambient Listening during the visit with  VALENCIA Mcghee for chart documentation. 11/20/2024  12:01 EST    VALENCIA Mcghee

## 2024-11-21 LAB
25(OH)D3+25(OH)D2 SERPL-MCNC: 71.2 NG/ML (ref 30–100)
ALBUMIN SERPL-MCNC: 4.5 G/DL (ref 3.8–4.9)
ALP SERPL-CCNC: 103 IU/L (ref 44–121)
ALT SERPL-CCNC: 10 IU/L (ref 0–32)
AST SERPL-CCNC: 12 IU/L (ref 0–40)
BASOPHILS # BLD AUTO: 0.1 X10E3/UL (ref 0–0.2)
BASOPHILS NFR BLD AUTO: 1 %
BILIRUB SERPL-MCNC: 0.4 MG/DL (ref 0–1.2)
BUN SERPL-MCNC: 11 MG/DL (ref 6–24)
BUN/CREAT SERPL: 12 (ref 9–23)
CALCIUM SERPL-MCNC: 10.2 MG/DL (ref 8.7–10.2)
CHLORIDE SERPL-SCNC: 98 MMOL/L (ref 96–106)
CHOLEST SERPL-MCNC: 130 MG/DL (ref 100–199)
CO2 SERPL-SCNC: 24 MMOL/L (ref 20–29)
CREAT SERPL-MCNC: 0.91 MG/DL (ref 0.57–1)
EGFRCR SERPLBLD CKD-EPI 2021: 75 ML/MIN/1.73
EOSINOPHIL # BLD AUTO: 0.1 X10E3/UL (ref 0–0.4)
EOSINOPHIL NFR BLD AUTO: 1 %
ERYTHROCYTE [DISTWIDTH] IN BLOOD BY AUTOMATED COUNT: 13.2 % (ref 11.7–15.4)
GLOBULIN SER CALC-MCNC: 2.8 G/DL (ref 1.5–4.5)
GLUCOSE SERPL-MCNC: 81 MG/DL (ref 70–99)
HBA1C MFR BLD: 6.5 % (ref 4.8–5.6)
HCT VFR BLD AUTO: 35.4 % (ref 34–46.6)
HDLC SERPL-MCNC: 54 MG/DL
HGB BLD-MCNC: 11.7 G/DL (ref 11.1–15.9)
IMM GRANULOCYTES # BLD AUTO: 0 X10E3/UL (ref 0–0.1)
IMM GRANULOCYTES NFR BLD AUTO: 0 %
LDLC SERPL CALC-MCNC: 61 MG/DL (ref 0–99)
LYMPHOCYTES # BLD AUTO: 4.2 X10E3/UL (ref 0.7–3.1)
LYMPHOCYTES NFR BLD AUTO: 30 %
MAGNESIUM SERPL-MCNC: 1.7 MG/DL (ref 1.6–2.3)
MCH RBC QN AUTO: 29 PG (ref 26.6–33)
MCHC RBC AUTO-ENTMCNC: 33.1 G/DL (ref 31.5–35.7)
MCV RBC AUTO: 88 FL (ref 79–97)
MONOCYTES # BLD AUTO: 0.8 X10E3/UL (ref 0.1–0.9)
MONOCYTES NFR BLD AUTO: 5 %
NEUTROPHILS # BLD AUTO: 9 X10E3/UL (ref 1.4–7)
NEUTROPHILS NFR BLD AUTO: 63 %
PLATELET # BLD AUTO: 449 X10E3/UL (ref 150–450)
POTASSIUM SERPL-SCNC: 4.1 MMOL/L (ref 3.5–5.2)
PROT SERPL-MCNC: 7.3 G/DL (ref 6–8.5)
RBC # BLD AUTO: 4.04 X10E6/UL (ref 3.77–5.28)
SODIUM SERPL-SCNC: 139 MMOL/L (ref 134–144)
TRIGL SERPL-MCNC: 73 MG/DL (ref 0–149)
TSH SERPL DL<=0.005 MIU/L-ACNC: 0.98 UIU/ML (ref 0.45–4.5)
VIT B12 SERPL-MCNC: 417 PG/ML (ref 232–1245)
VLDLC SERPL CALC-MCNC: 15 MG/DL (ref 5–40)
WBC # BLD AUTO: 14.2 X10E3/UL (ref 3.4–10.8)

## 2024-11-22 LAB
BACTERIA UR CULT: NORMAL
BACTERIA UR CULT: NORMAL

## 2024-11-27 ENCOUNTER — OFFICE VISIT (OUTPATIENT)
Dept: ENDOCRINOLOGY | Facility: CLINIC | Age: 54
End: 2024-11-27
Payer: COMMERCIAL

## 2024-11-27 VITALS
WEIGHT: 210 LBS | HEART RATE: 96 BPM | SYSTOLIC BLOOD PRESSURE: 124 MMHG | OXYGEN SATURATION: 97 % | DIASTOLIC BLOOD PRESSURE: 68 MMHG | HEIGHT: 65 IN | BODY MASS INDEX: 34.99 KG/M2

## 2024-11-27 DIAGNOSIS — Z79.4 TYPE 2 DIABETES MELLITUS WITH DIABETIC MICROALBUMINURIA, WITH LONG-TERM CURRENT USE OF INSULIN: Primary | ICD-10-CM

## 2024-11-27 DIAGNOSIS — E78.2 MIXED HYPERLIPIDEMIA: ICD-10-CM

## 2024-11-27 DIAGNOSIS — E11.29 TYPE 2 DIABETES MELLITUS WITH DIABETIC MICROALBUMINURIA, WITH LONG-TERM CURRENT USE OF INSULIN: Primary | ICD-10-CM

## 2024-11-27 DIAGNOSIS — R80.9 TYPE 2 DIABETES MELLITUS WITH DIABETIC MICROALBUMINURIA, WITH LONG-TERM CURRENT USE OF INSULIN: Primary | ICD-10-CM

## 2024-11-27 PROCEDURE — 99214 OFFICE O/P EST MOD 30 MIN: CPT | Performed by: HOSPITALIST

## 2024-11-27 RX ORDER — ATORVASTATIN CALCIUM 10 MG/1
10 TABLET, FILM COATED ORAL DAILY
Qty: 90 TABLET | Refills: 1 | Status: SHIPPED | OUTPATIENT
Start: 2024-11-27

## 2024-11-27 RX ORDER — METFORMIN HYDROCHLORIDE 500 MG/1
1000 TABLET, EXTENDED RELEASE ORAL 2 TIMES DAILY WITH MEALS
Qty: 360 TABLET | Refills: 1 | Status: SHIPPED | OUTPATIENT
Start: 2024-11-27

## 2024-11-27 RX ORDER — SEMAGLUTIDE 2.68 MG/ML
2 INJECTION, SOLUTION SUBCUTANEOUS WEEKLY
Qty: 9 ML | Refills: 1 | Status: SHIPPED | OUTPATIENT
Start: 2024-11-27

## 2024-11-27 NOTE — PROGRESS NOTES
Chief complaint/Reason for consult: T2DM    HPI: Ms. Heller is a 54-year-old female with T2DM, hypertension, hyperlipidemia, bipolar disorder and obesity who comes for follow-up of diabetes.  She was last seen 7/24/2024 and reports since that time having an episode of severe dizziness with diaphoresis and low blood pressure back around September.  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  Blood sugar during spells is normal.  Additionally, she has a hysterectomy planned for later this year.      # [id number], controlled with complications  # Elevated urine microalbumin  # Diabetic polyneuropathy   - Diagnosed: ~2014   - Current regimen includes: Tresiba 34 units daily, metformin XR 1000 mg twice daily and Ozempic 2.0 mg weekly  - Previously with very bothersome yeast infections, none recently   - Ozempic is tolerated, has occasional constipation   - Compliance with medications is fair, misses a rare dose of medicine   - HbA1c: 6.5% done 11/20/2024, 11.6% done 2/13/2024  - Not using Dexcom CGM due to cost  - Check FSBG only a few times per week   - Hypoglycemia occurs rarely with fasting, nothing documented below 70 mg/dL  - Unsure if hyperglycemia is occurring, occasionally her blood glucose will be 150 mg/dL if she forgets her medicine  - Patient reports neuropathy is much better   - Patient denies gastroparesis   - Patient reports rotating injection sites   - Patient with known ASCVD   - taking lisinopril 10 mg daily; blood pressure today 124/68     DM Health Maintenance:  Ophtho: done 2/6/2024, no retinopathy  Monofilament / Foot exam: Done 3/20/2024  Lipids/Statin: taking a statin with last FLP showing LDL 61 done 11/20/2024   SIENNA:Cr 267 done 2/13/2024  TSH: 0.983 done 11/20/2024  Aspirin: not taking    Past medical history, past surgical history, family history and social  "history reviewed within this encounter.     Review of Systems   Constitutional:  Negative for activity change and unexpected weight change.   HENT:  Negative for trouble swallowing.    Respiratory:  Negative for shortness of breath.    Gastrointestinal:  Positive for constipation. Negative for abdominal pain.   Endocrine: Negative for cold intolerance and heat intolerance.   Genitourinary:  Positive for menstrual problem.   Musculoskeletal:  Negative for gait problem.   Skin:  Negative for rash.   Neurological:  Positive for numbness.   Psychiatric/Behavioral:  Negative for agitation.         /68 (BP Location: Left arm, Patient Position: Sitting, Cuff Size: Adult)   Pulse 96   Ht 165.1 cm (65\")   Wt 95.3 kg (210 lb)   SpO2 97%   BMI 34.95 kg/m²      Physical Exam  Vitals reviewed.   Constitutional:       General: She is not in acute distress.     Appearance: She is obese.   HENT:      Head: Normocephalic.      Nose: Nose normal.   Eyes:      Conjunctiva/sclera: Conjunctivae normal.   Cardiovascular:      Rate and Rhythm: Normal rate.   Pulmonary:      Effort: Pulmonary effort is normal.   Abdominal:      Tenderness: There is no guarding.   Musculoskeletal:         General: No deformity.   Skin:     General: Skin is warm and dry.   Neurological:      Mental Status: She is alert and oriented to person, place, and time.   Psychiatric:         Mood and Affect: Mood normal.          Labs and images reviewed as noted in the HPI    Assessment and plan:    Diagnoses and all orders for this visit:    1. Type 2 diabetes mellitus with diabetic microalbuminuria, with long-term current use of insulin (Primary)  Assessment & Plan:  -Diabetes is currently well-controlled  -Complications include elevated urine microalbumin and diabetic polyneuropathy  -Continue dietary changes  -Recommend weight loss  -Continue Tresiba to 34 units daily  -Continue metformin XR 1000 mg twice daily  -Continue Ozempic to 2.0 mg " weekly  -Continue to check FSBG once daily and as needed for signs/symptoms of hypoglycemia  -May consider SGLT2 inhibitor in the future if she continues to be without yeast infection/UTI  -Continue lisinopril 10 mg daily; blood pressure today 124/68     DM Health Maintenance:  Ophtho: done 2/6/2024, no retinopathy  Monofilament / Foot exam: Done 3/20/2024  Lipids/Statin: taking a statin with last FLP showing LDL 61 done 11/20/2024   SIENNA:Cr 267 done 2/13/2024, repeat today  TSH: 0.983 done 11/20/2024  Aspirin: not taking       Orders:  -     Microalbumin / Creatinine Urine Ratio - Urine, Clean Catch  -     insulin degludec (TRESIBA FLEXTOUCH) 100 UNIT/ML solution pen-injector injection; Inject 34 units once daily, titrate for max daily dose of 50 units  Dispense: 15 mL; Refill: 5  -     Insulin Pen Needle 32G X 4 MM misc; Use 1 each Daily.  Dispense: 100 each; Refill: 3  -     metFORMIN ER (GLUCOPHAGE-XR) 500 MG 24 hr tablet; Take 2 tablets by mouth 2 (Two) Times a Day With Meals.  Dispense: 360 tablet; Refill: 1  -     Semaglutide, 2 MG/DOSE, (Ozempic, 2 MG/DOSE,) 8 MG/3ML solution pen-injector; Inject 2 mg under the skin into the appropriate area as directed 1 (One) Time Per Week.  Dispense: 9 mL; Refill: 1    2. Mixed hyperlipidemia  Assessment & Plan:  -Controlled, continue atorvastatin    Orders:  -     atorvastatin (LIPITOR) 10 MG tablet; Take 1 tablet by mouth Daily.  Dispense: 90 tablet; Refill: 1         Return in about 3 months (around 2/27/2025) for T2DM.     Please note that portions of this note may have been completed with a voice recognition program. Efforts were made to edit the dictations, but occasionally words are mistranscribed.     Electronically signed by: Kyle S Rosenstein, MD   Addendum  Labs 11/27/2024  Urine microalbumin is negative  Results sent to [facility]

## 2024-11-27 NOTE — ASSESSMENT & PLAN NOTE
-Diabetes is currently well-controlled  -Complications include elevated urine microalbumin and diabetic polyneuropathy  -Continue dietary changes  -Recommend weight loss  -Continue Tresiba to 34 units daily  -Continue metformin XR 1000 mg twice daily  -Continue Ozempic to 2.0 mg weekly  -Continue to check FSBG once daily and as needed for signs/symptoms of hypoglycemia  -May consider SGLT2 inhibitor in the future if she continues to be without yeast infection/UTI  -Continue lisinopril 10 mg daily; blood pressure today 124/68     DM Health Maintenance:  Ophtho: done 2/6/2024, no retinopathy  Monofilament / Foot exam: Done 3/20/2024  Lipids/Statin: taking a statin with last FLP showing LDL 61 done 11/20/2024   SIENNA:Cr 267 done 2/13/2024, repeat today  TSH: 0.983 done 11/20/2024  Aspirin: not taking

## 2024-11-28 LAB
ALBUMIN/CREAT UR: <21 MG/G CREAT (ref 0–29)
CREAT UR-MCNC: 14.2 MG/DL
MICROALBUMIN UR-MCNC: <3 UG/ML

## 2024-12-09 ENCOUNTER — TELEPHONE (OUTPATIENT)
Dept: FAMILY MEDICINE CLINIC | Facility: CLINIC | Age: 54
End: 2024-12-09

## 2024-12-09 DIAGNOSIS — Z86.19 HISTORY OF COLD SORES: ICD-10-CM

## 2024-12-09 RX ORDER — VALACYCLOVIR HYDROCHLORIDE 1 G/1
TABLET, FILM COATED ORAL
Qty: 20 TABLET | Refills: 2 | Status: SHIPPED | OUTPATIENT
Start: 2024-12-09

## 2024-12-18 ENCOUNTER — PREP FOR SURGERY (OUTPATIENT)
Dept: OTHER | Facility: HOSPITAL | Age: 54
End: 2024-12-18
Payer: COMMERCIAL

## 2024-12-18 ENCOUNTER — OFFICE VISIT (OUTPATIENT)
Dept: OBSTETRICS AND GYNECOLOGY | Facility: CLINIC | Age: 54
End: 2024-12-18
Payer: COMMERCIAL

## 2024-12-18 VITALS — DIASTOLIC BLOOD PRESSURE: 74 MMHG | SYSTOLIC BLOOD PRESSURE: 112 MMHG | BODY MASS INDEX: 35.61 KG/M2 | WEIGHT: 214 LBS

## 2024-12-18 DIAGNOSIS — D25.1 INTRAMURAL LEIOMYOMA OF UTERUS: ICD-10-CM

## 2024-12-18 DIAGNOSIS — R45.4 ANGER: ICD-10-CM

## 2024-12-18 DIAGNOSIS — F32.9 MAJOR DEPRESSIVE DISORDER WITH CURRENT ACTIVE EPISODE, UNSPECIFIED DEPRESSION EPISODE SEVERITY, UNSPECIFIED WHETHER RECURRENT: ICD-10-CM

## 2024-12-18 DIAGNOSIS — N92.1 MENORRHAGIA WITH IRREGULAR CYCLE: Primary | ICD-10-CM

## 2024-12-18 NOTE — PROGRESS NOTES
Gynecologic Preoperative Exam Note        Subjective   Emily Heller is a 54 y.o. year old  who is scheduled for Da Gabe TL with bilateral salpingo-oophorectomy  at [facility] on 24 Pre Admission testing has been scheduled for 24 at [facility]. Her pre operative diagnosis is DUB. She does not need to see her PCP for preop clearance for this surgery. Patient's last menstrual period was 2024 (exact date).. Her birth control method is vasectomy.  Her BMI is Body mass index is 35.61 kg/m²..  Her medical history is significant for  none .    She has reviewed the informational pamphlet on 24.    She understands the risks of bleeding, infection, possible damage to other organ systems, including but not limited to the gastrointestinal tract and genitourinary tract.  She also understands the specific risks listed in the preop information (video, pamphlets, etc.).    She has reviewed and signed the preop consent form.    Her code status is: FULL     She has been instructed to have a light dinner the night before surgery, then nothing to eat or drink after midnight.  The day of surgery do not chew gum or smoke.  Remove all jewelry, nail polish, contact lenses prior to coming to the hospital.  Do not bring valuables or large sums of money with you. Patient was instructed on what time to arrive and where to check in, maps were given.  She was instructed that she will meet an Anesthesiologist and that an IV will be started to provide fluids and sedation.  The total time of procedure was discussed.  She was instructed that she will need a .      Allergies   Allergen Reactions    Morphine Anaphylaxis    Penicillins Anaphylaxis    Flagyl [Metronidazole] Other (See Comments)     BLISTER IN MOUTH     She has confirmed that she is not allergic to Latex.     She is on the following medications. These were reviewed with the patient today and instructed on which  medications are ok to take with a sip of water prior to the surgery.      Current Outpatient Medications:     Alcohol Swabs pads, For use to check blood sugars and prior to giving insulin, Disp: 100 each, Rfl: 2    atorvastatin (LIPITOR) 10 MG tablet, Take 1 tablet by mouth Daily., Disp: 90 tablet, Rfl: 1    Continuous Glucose Sensor (Dexcom G7 Sensor) misc, Use 1 Device Every 10 (Ten) Days., Disp: 3 each, Rfl: 11    fluticasone (FLONASE) 50 MCG/ACT nasal spray, Administer 2 sprays into the nostril(s) as directed by provider Daily., Disp: 16 g, Rfl: 2    glucose blood test strip, Use as directed daily; Accu-Chek Guide Me Strips; E11.9, Disp: 100 each, Rfl: 3    insulin degludec (TRESIBA FLEXTOUCH) 100 UNIT/ML solution pen-injector injection, Inject 34 units once daily, titrate for max daily dose of 50 units, Disp: 15 mL, Rfl: 5    Insulin Pen Needle 32G X 4 MM misc, Use 1 each Daily., Disp: 100 each, Rfl: 3    lamoTRIgine (LaMICtal) 200 MG tablet, TAKE 1 TABLET BY MOUTH DAILY, Disp: 30 tablet, Rfl: 0    Lancets (accu-chek multiclix) lancets, Check glucose tid and prn; Accu-Chek Guide Me lancets; E11.9, Disp: 100 each, Rfl: 5    levocetirizine (XYZAL) 5 MG tablet, Take 1 tablet by mouth Every Evening., Disp: 30 tablet, Rfl: 2    linaclotide (Linzess) 290 MCG capsule capsule, Take 1 capsule by mouth Every Morning Before Breakfast. Take 30 minutes before first daily meal., Disp: 30 capsule, Rfl: 12    lisinopril (PRINIVIL,ZESTRIL) 10 MG tablet, Take 1 tablet by mouth Daily., Disp: 90 tablet, Rfl: 1    lisinopril-hydrochlorothiazide (PRINZIDE,ZESTORETIC) 10-12.5 MG per tablet, Take 1 tablet by mouth Daily., Disp: , Rfl:     metFORMIN ER (GLUCOPHAGE-XR) 500 MG 24 hr tablet, Take 2 tablets by mouth 2 (Two) Times a Day With Meals., Disp: 360 tablet, Rfl: 1    Semaglutide, 2 MG/DOSE, (Ozempic, 2 MG/DOSE,) 8 MG/3ML solution pen-injector, Inject 2 mg under the skin into the appropriate area as directed 1 (One) Time Per Week.,  Disp: 9 mL, Rfl: 1    spironolactone (ALDACTONE) 25 MG tablet, TAKE 1 TABLET BY MOUTH DAILY, Disp: 90 tablet, Rfl: 0    valACYclovir (VALTREX) 1000 MG tablet, TAKE TWO TABLETS BY MOUTH EVERY 12 HOURS FOR ONE DAY AS NEEDED FOR COLD SORE. START AS SOON AS POSSIBLE AFTER ONSET OF SYMPTOMS, Disp: 20 tablet, Rfl: 2     Past Medical History:   Diagnosis Date    Encounter for long-term (current) use of other medications 2022    Abnormal Pap smear of cervix     ADD (attention deficit disorder)     Alcoholism     Anxiety     Arthritis     Asthma     Bipolar disorder     Cancer Nasal cell carcinoma    Carpal tunnel syndrome     Depression     Diabetes     Fibroid 2022    Gestational hypertension 2001    H/O cold sores     Hyperlipidemia     Hypertension     Kidney failure     Leukocytosis     Migraine     Obesity     PCOS (polycystic ovarian syndrome)     Sleep apnea 2004    Urinary tract infection      Past Surgical History:   Procedure Laterality Date    REDUCTION MAMMAPLASTY      CERVICAL BIOPSY  W/ LOOP ELECTRODE EXCISION      CHOLECYSTECTOMY      COLONOSCOPY  2023    DILATATION AND CURETTAGE  10/6/22    GANGLION CYST EXCISION      LEEP      WISDOM TOOTH EXTRACTION       OB History    Para Term  AB Living   3 3 3 0 0 0   SAB IAB Ectopic Molar Multiple Live Births   0 0 0 0 0 3      # Outcome Date GA Lbr Twin/2nd Weight Sex Type Anes PTL Lv   3 Term      Vag-Spont      2 Term      Vag-Spont      1 Term      Vag-Spont        Social History     Tobacco Use   Smoking Status Former    Current packs/day: 0.00    Average packs/day: 1 pack/day for 16.0 years (16.0 ttl pk-yrs)    Types: Cigarettes    Start date: 1987    Quit date: 2003    Years since quittin.9    Passive exposure: Past   Smokeless Tobacco Never     Social History     Substance and Sexual Activity   Alcohol Use Yes    Comment: May have a glass of wine or a beer once every 3 or 4 months     Social History      Substance and Sexual Activity   Drug Use Never         Review of Systems   Constitutional: Negative.    Respiratory: Negative.     Cardiovascular: Negative.    Gastrointestinal: Negative.    Genitourinary:  Positive for menstrual problem and pelvic pain. Negative for breast discharge, breast lump and breast pain.   Musculoskeletal: Negative.    Neurological: Negative.    Psychiatric/Behavioral: Negative.        All other systems reviewed and negative.          Objective    Vitals:    12/18/24 0952   BP: 112/74         Physical Exam  Vitals reviewed.   Constitutional:       Appearance: Normal appearance. She is normal weight.   HENT:      Head: Normocephalic and atraumatic.   Cardiovascular:      Rate and Rhythm: Normal rate and regular rhythm.   Pulmonary:      Effort: Pulmonary effort is normal.      Breath sounds: Normal breath sounds.   Abdominal:      General: Abdomen is flat. Bowel sounds are normal.      Palpations: Abdomen is soft.   Skin:     General: Skin is warm and dry.   Neurological:      Mental Status: She is alert and oriented to person, place, and time.   Psychiatric:         Mood and Affect: Mood normal.         Behavior: Behavior normal.         Assessment   Problem List Items Addressed This Visit       Menorrhagia with irregular cycle - Primary    Intramural leiomyoma of uterus                Plan   Will proceed with TLH/BSO due to symptomatic uterine fibroids  Risks of surgery were reviewed with the patient including risks of bleeding, infection, damage to other organ systems including, but not limited to GI and  tracts (bowel, bladder, blood vessels, nerves) risks of Anesthesia, as well as the risk the surgery will not produce the desired results, possible need for additional surgery, death, risk of uterine perforation.  PAT Scheduled    Suhail has been obtained and reviewed   Pain Medication Consent Form has been signed.  A review regarding proper medication administration, impact on  driving and working while medicated, the safety of use in pregnancy, the potential for overdose and the proper disposal and storage of controlled medications has been done with the patient.          Amish Leavitt MD  Visit Date: 12/18/2024

## 2024-12-18 NOTE — H&P (VIEW-ONLY)
Gynecologic Preoperative Exam Note        Subjective   Emily Heller is a 54 y.o. year old  who is scheduled for Da Gabe TL with bilateral salpingo-oophorectomy  at [facility] on 24 Pre Admission testing has been scheduled for 24 at [facility]. Her pre operative diagnosis is DUB. She does not need to see her PCP for preop clearance for this surgery. Patient's last menstrual period was 2024 (exact date).. Her birth control method is vasectomy.  Her BMI is Body mass index is 35.61 kg/m²..  Her medical history is significant for  none .    She has reviewed the informational pamphlet on 24.    She understands the risks of bleeding, infection, possible damage to other organ systems, including but not limited to the gastrointestinal tract and genitourinary tract.  She also understands the specific risks listed in the preop information (video, pamphlets, etc.).    She has reviewed and signed the preop consent form.    Her code status is: FULL     She has been instructed to have a light dinner the night before surgery, then nothing to eat or drink after midnight.  The day of surgery do not chew gum or smoke.  Remove all jewelry, nail polish, contact lenses prior to coming to the hospital.  Do not bring valuables or large sums of money with you. Patient was instructed on what time to arrive and where to check in, maps were given.  She was instructed that she will meet an Anesthesiologist and that an IV will be started to provide fluids and sedation.  The total time of procedure was discussed.  She was instructed that she will need a .      Allergies   Allergen Reactions    Morphine Anaphylaxis    Penicillins Anaphylaxis    Flagyl [Metronidazole] Other (See Comments)     BLISTER IN MOUTH     She has confirmed that she is not allergic to Latex.     She is on the following medications. These were reviewed with the patient today and instructed on which  medications are ok to take with a sip of water prior to the surgery.      Current Outpatient Medications:     Alcohol Swabs pads, For use to check blood sugars and prior to giving insulin, Disp: 100 each, Rfl: 2    atorvastatin (LIPITOR) 10 MG tablet, Take 1 tablet by mouth Daily., Disp: 90 tablet, Rfl: 1    Continuous Glucose Sensor (Dexcom G7 Sensor) misc, Use 1 Device Every 10 (Ten) Days., Disp: 3 each, Rfl: 11    fluticasone (FLONASE) 50 MCG/ACT nasal spray, Administer 2 sprays into the nostril(s) as directed by provider Daily., Disp: 16 g, Rfl: 2    glucose blood test strip, Use as directed daily; Accu-Chek Guide Me Strips; E11.9, Disp: 100 each, Rfl: 3    insulin degludec (TRESIBA FLEXTOUCH) 100 UNIT/ML solution pen-injector injection, Inject 34 units once daily, titrate for max daily dose of 50 units, Disp: 15 mL, Rfl: 5    Insulin Pen Needle 32G X 4 MM misc, Use 1 each Daily., Disp: 100 each, Rfl: 3    lamoTRIgine (LaMICtal) 200 MG tablet, TAKE 1 TABLET BY MOUTH DAILY, Disp: 30 tablet, Rfl: 0    Lancets (accu-chek multiclix) lancets, Check glucose tid and prn; Accu-Chek Guide Me lancets; E11.9, Disp: 100 each, Rfl: 5    levocetirizine (XYZAL) 5 MG tablet, Take 1 tablet by mouth Every Evening., Disp: 30 tablet, Rfl: 2    linaclotide (Linzess) 290 MCG capsule capsule, Take 1 capsule by mouth Every Morning Before Breakfast. Take 30 minutes before first daily meal., Disp: 30 capsule, Rfl: 12    lisinopril (PRINIVIL,ZESTRIL) 10 MG tablet, Take 1 tablet by mouth Daily., Disp: 90 tablet, Rfl: 1    lisinopril-hydrochlorothiazide (PRINZIDE,ZESTORETIC) 10-12.5 MG per tablet, Take 1 tablet by mouth Daily., Disp: , Rfl:     metFORMIN ER (GLUCOPHAGE-XR) 500 MG 24 hr tablet, Take 2 tablets by mouth 2 (Two) Times a Day With Meals., Disp: 360 tablet, Rfl: 1    Semaglutide, 2 MG/DOSE, (Ozempic, 2 MG/DOSE,) 8 MG/3ML solution pen-injector, Inject 2 mg under the skin into the appropriate area as directed 1 (One) Time Per Week.,  Disp: 9 mL, Rfl: 1    spironolactone (ALDACTONE) 25 MG tablet, TAKE 1 TABLET BY MOUTH DAILY, Disp: 90 tablet, Rfl: 0    valACYclovir (VALTREX) 1000 MG tablet, TAKE TWO TABLETS BY MOUTH EVERY 12 HOURS FOR ONE DAY AS NEEDED FOR COLD SORE. START AS SOON AS POSSIBLE AFTER ONSET OF SYMPTOMS, Disp: 20 tablet, Rfl: 2     Past Medical History:   Diagnosis Date    Encounter for long-term (current) use of other medications 2022    Abnormal Pap smear of cervix     ADD (attention deficit disorder)     Alcoholism     Anxiety     Arthritis     Asthma     Bipolar disorder     Cancer Nasal cell carcinoma    Carpal tunnel syndrome     Depression     Diabetes     Fibroid 2022    Gestational hypertension 2001    H/O cold sores     Hyperlipidemia     Hypertension     Kidney failure     Leukocytosis     Migraine     Obesity     PCOS (polycystic ovarian syndrome)     Sleep apnea 2004    Urinary tract infection      Past Surgical History:   Procedure Laterality Date    REDUCTION MAMMAPLASTY      CERVICAL BIOPSY  W/ LOOP ELECTRODE EXCISION      CHOLECYSTECTOMY      COLONOSCOPY  2023    DILATATION AND CURETTAGE  10/6/22    GANGLION CYST EXCISION      LEEP      WISDOM TOOTH EXTRACTION       OB History    Para Term  AB Living   3 3 3 0 0 0   SAB IAB Ectopic Molar Multiple Live Births   0 0 0 0 0 3      # Outcome Date GA Lbr Twin/2nd Weight Sex Type Anes PTL Lv   3 Term      Vag-Spont      2 Term      Vag-Spont      1 Term      Vag-Spont        Social History     Tobacco Use   Smoking Status Former    Current packs/day: 0.00    Average packs/day: 1 pack/day for 16.0 years (16.0 ttl pk-yrs)    Types: Cigarettes    Start date: 1987    Quit date: 2003    Years since quittin.9    Passive exposure: Past   Smokeless Tobacco Never     Social History     Substance and Sexual Activity   Alcohol Use Yes    Comment: May have a glass of wine or a beer once every 3 or 4 months     Social History      Substance and Sexual Activity   Drug Use Never         Review of Systems   Constitutional: Negative.    Respiratory: Negative.     Cardiovascular: Negative.    Gastrointestinal: Negative.    Genitourinary:  Positive for menstrual problem and pelvic pain. Negative for breast discharge, breast lump and breast pain.   Musculoskeletal: Negative.    Neurological: Negative.    Psychiatric/Behavioral: Negative.        All other systems reviewed and negative.          Objective    Vitals:    12/18/24 0952   BP: 112/74         Physical Exam  Vitals reviewed.   Constitutional:       Appearance: Normal appearance. She is normal weight.   HENT:      Head: Normocephalic and atraumatic.   Cardiovascular:      Rate and Rhythm: Normal rate and regular rhythm.   Pulmonary:      Effort: Pulmonary effort is normal.      Breath sounds: Normal breath sounds.   Abdominal:      General: Abdomen is flat. Bowel sounds are normal.      Palpations: Abdomen is soft.   Skin:     General: Skin is warm and dry.   Neurological:      Mental Status: She is alert and oriented to person, place, and time.   Psychiatric:         Mood and Affect: Mood normal.         Behavior: Behavior normal.         Assessment   Problem List Items Addressed This Visit       Menorrhagia with irregular cycle - Primary    Intramural leiomyoma of uterus                Plan   Will proceed with TLH/BSO due to symptomatic uterine fibroids  Risks of surgery were reviewed with the patient including risks of bleeding, infection, damage to other organ systems including, but not limited to GI and  tracts (bowel, bladder, blood vessels, nerves) risks of Anesthesia, as well as the risk the surgery will not produce the desired results, possible need for additional surgery, death, risk of uterine perforation.  PAT Scheduled    Suhail has been obtained and reviewed   Pain Medication Consent Form has been signed.  A review regarding proper medication administration, impact on  driving and working while medicated, the safety of use in pregnancy, the potential for overdose and the proper disposal and storage of controlled medications has been done with the patient.          Amish Leavitt MD  Visit Date: 12/18/2024

## 2024-12-19 RX ORDER — LAMOTRIGINE 200 MG/1
200 TABLET ORAL DAILY
Qty: 30 TABLET | Refills: 0 | Status: SHIPPED | OUTPATIENT
Start: 2024-12-19

## 2024-12-23 ENCOUNTER — PRE-ADMISSION TESTING (OUTPATIENT)
Dept: PREADMISSION TESTING | Facility: HOSPITAL | Age: 54
End: 2024-12-23
Payer: COMMERCIAL

## 2024-12-23 VITALS — BODY MASS INDEX: 35.35 KG/M2 | HEIGHT: 65 IN | WEIGHT: 212.19 LBS

## 2024-12-23 DIAGNOSIS — N92.1 MENORRHAGIA WITH IRREGULAR CYCLE: ICD-10-CM

## 2024-12-23 LAB
ABO GROUP BLD: NORMAL
ALBUMIN SERPL-MCNC: 4.1 G/DL (ref 3.5–5.2)
ALBUMIN/GLOB SERPL: 1.2 G/DL
ALP SERPL-CCNC: 123 U/L (ref 39–117)
ALT SERPL W P-5'-P-CCNC: 9 U/L (ref 1–33)
ANION GAP SERPL CALCULATED.3IONS-SCNC: 15 MMOL/L (ref 5–15)
AST SERPL-CCNC: 16 U/L (ref 1–32)
BASOPHILS # BLD AUTO: 0.06 10*3/MM3 (ref 0–0.2)
BASOPHILS NFR BLD AUTO: 0.4 % (ref 0–1.5)
BILIRUB SERPL-MCNC: 0.3 MG/DL (ref 0–1.2)
BLD GP AB SCN SERPL QL: NEGATIVE
BUN SERPL-MCNC: 12 MG/DL (ref 6–20)
BUN/CREAT SERPL: 15.8 (ref 7–25)
CALCIUM SPEC-SCNC: 9.4 MG/DL (ref 8.6–10.5)
CHLORIDE SERPL-SCNC: 100 MMOL/L (ref 98–107)
CO2 SERPL-SCNC: 21 MMOL/L (ref 22–29)
CREAT SERPL-MCNC: 0.76 MG/DL (ref 0.57–1)
DEPRECATED RDW RBC AUTO: 42.8 FL (ref 37–54)
EGFRCR SERPLBLD CKD-EPI 2021: 93.8 ML/MIN/1.73
EOSINOPHIL # BLD AUTO: 0.1 10*3/MM3 (ref 0–0.4)
EOSINOPHIL NFR BLD AUTO: 0.7 % (ref 0.3–6.2)
ERYTHROCYTE [DISTWIDTH] IN BLOOD BY AUTOMATED COUNT: 13.8 % (ref 12.3–15.4)
GLOBULIN UR ELPH-MCNC: 3.4 GM/DL
GLUCOSE SERPL-MCNC: 133 MG/DL (ref 65–99)
HCG INTACT+B SERPL-ACNC: <0.1 MIU/ML
HCT VFR BLD AUTO: 32.7 % (ref 34–46.6)
HGB BLD-MCNC: 10.7 G/DL (ref 12–15.9)
IMM GRANULOCYTES # BLD AUTO: 0.05 10*3/MM3 (ref 0–0.05)
IMM GRANULOCYTES NFR BLD AUTO: 0.4 % (ref 0–0.5)
LYMPHOCYTES # BLD AUTO: 2.78 10*3/MM3 (ref 0.7–3.1)
LYMPHOCYTES NFR BLD AUTO: 19.7 % (ref 19.6–45.3)
MCH RBC QN AUTO: 28.1 PG (ref 26.6–33)
MCHC RBC AUTO-ENTMCNC: 32.7 G/DL (ref 31.5–35.7)
MCV RBC AUTO: 85.8 FL (ref 79–97)
MONOCYTES # BLD AUTO: 0.68 10*3/MM3 (ref 0.1–0.9)
MONOCYTES NFR BLD AUTO: 4.8 % (ref 5–12)
NEUTROPHILS NFR BLD AUTO: 10.41 10*3/MM3 (ref 1.7–7)
NEUTROPHILS NFR BLD AUTO: 74 % (ref 42.7–76)
NRBC BLD AUTO-RTO: 0 /100 WBC (ref 0–0.2)
PLATELET # BLD AUTO: 418 10*3/MM3 (ref 140–450)
PMV BLD AUTO: 9 FL (ref 6–12)
POTASSIUM SERPL-SCNC: 4.2 MMOL/L (ref 3.5–5.2)
PROT SERPL-MCNC: 7.5 G/DL (ref 6–8.5)
QT INTERVAL: 350 MS
QTC INTERVAL: 439 MS
RBC # BLD AUTO: 3.81 10*6/MM3 (ref 3.77–5.28)
RH BLD: POSITIVE
SODIUM SERPL-SCNC: 136 MMOL/L (ref 136–145)
T&S EXPIRATION DATE: NORMAL
WBC NRBC COR # BLD AUTO: 14.08 10*3/MM3 (ref 3.4–10.8)

## 2024-12-23 PROCEDURE — 93005 ELECTROCARDIOGRAM TRACING: CPT

## 2024-12-23 PROCEDURE — 80053 COMPREHEN METABOLIC PANEL: CPT

## 2024-12-23 PROCEDURE — 86850 RBC ANTIBODY SCREEN: CPT

## 2024-12-23 PROCEDURE — 86901 BLOOD TYPING SEROLOGIC RH(D): CPT

## 2024-12-23 PROCEDURE — 36415 COLL VENOUS BLD VENIPUNCTURE: CPT

## 2024-12-23 PROCEDURE — 86900 BLOOD TYPING SEROLOGIC ABO: CPT

## 2024-12-23 PROCEDURE — 84702 CHORIONIC GONADOTROPIN TEST: CPT

## 2024-12-23 PROCEDURE — 85025 COMPLETE CBC W/AUTO DIFF WBC: CPT

## 2024-12-23 NOTE — PAT
An arrival time for procedure was not provided during PAT visit. If patient had any questions or concerns about their arrival time, they were instructed to contact their surgeon/physician.  Additionally, if the patient referred to an arrival time that was acquired from their my chart account, patient was encouraged to verify that time with their surgeon/physician. Arrival times are NOT provided in Pre Admission Testing Department.    Per Anesthesia Request, patient instructed not to take their ACE/ARB medications on the AM of surgery.     Patient instructed to drink 20 ounces of Gatorade or Gatorlyte (if diabetic) and it needs to be completed 1 hour (for Main OR patients) or 2 hours (scheduled  section & [facility] patients) before given arrival time for procedure (NO RED Gatorade and NO Gatorade Zero).    Patient verbalized understanding.    Patient to apply Chlorhexadine wipes  to surgical area (as instructed) the night before procedure and the AM of procedure. Wipes provided.    An arrival time for procedure was not provided during PAT visit. If patient had any questions or concerns about their arrival time, they were instructed to contact their surgeon/physician.  Additionally, if the patient referred to an arrival time that was acquired from their my chart account, patient was encouraged to verify that time with their surgeon/physician. Arrival times are NOT provided in Pre Admission Testing Department.    Patient viewed general PAT education video as instructed in their preoperative information received from their surgeon.  Patient stated the general PAT education video was viewed in its entirety and survey completed.  Copies of PAT general education handouts (Incentive Spirometry, Meds to Beds Program, Patient Belongings, Pre-op skin preparation instructions, Blood Glucose testing, Visitor policy, Surgery FAQ, Code H) distributed to patient if not printed. Education related to the PAT pass and skin  preparation for surgery (if applicable) completed in PAT as a reinforcement to PAT education video. Patient instructed to return PAT pass provided today as well as completed skin preparation sheet (if applicable) on the day of procedure.     Additionally if patient had not viewed video yet but intended to view it at home or in our waiting area, then referred them to the handout with QR code/link provided during PAT visit.  Instructed patient to complete survey after viewing the video in its entirety.  Encouraged patient/family to read PAT general education handouts thoroughly and notify PAT staff with any questions or concerns. Patient verbalized understanding of all information and priority content.    Patient denies any current skin issues.     Patient to apply Chlorhexadine wipes  to surgical area (as instructed) the night before procedure and the AM of procedure. Wipes provided.    Instructed patient to take two Tylenol extra strength (total of 1000 mg) the night before surgery.    Patient instructed to drink 20 ounces of Gatorade or Gatorlyte (if diabetic) and it needs to be completed 1 hour (for Main OR patients) or 2 hours (scheduled  section & [facility] patients) before given arrival time for procedure (NO RED Gatorade and NO Gatorade Zero).    Patient verbalized understanding.    Blood bank bracelet applied to patient during Pre Admission Testing visit.  Patient instructed not to remove from arm until after procedure and they are discharged from the hospital.  Explained to patient that they may shower and get the bracelet wet, but not to immerse under water for longer periods (bathing, swimming, hand dishwashing, etc).  Patient verbalized understanding.

## 2024-12-26 ENCOUNTER — ANESTHESIA EVENT (OUTPATIENT)
Dept: PERIOP | Facility: HOSPITAL | Age: 54
End: 2024-12-26
Payer: COMMERCIAL

## 2024-12-26 ENCOUNTER — HOSPITAL ENCOUNTER (OUTPATIENT)
Facility: HOSPITAL | Age: 54
Discharge: HOME | End: 2024-12-27
Attending: OBSTETRICS & GYNECOLOGY | Admitting: OBSTETRICS & GYNECOLOGY
Payer: COMMERCIAL

## 2024-12-26 ENCOUNTER — ANESTHESIA (OUTPATIENT)
Dept: PERIOP | Facility: HOSPITAL | Age: 54
End: 2024-12-26
Payer: COMMERCIAL

## 2024-12-26 DIAGNOSIS — Z90.710 STATUS POST HYSTERECTOMY: Primary | ICD-10-CM

## 2024-12-26 DIAGNOSIS — N92.1 MENORRHAGIA WITH IRREGULAR CYCLE: ICD-10-CM

## 2024-12-26 PROBLEM — N92.0 MENORRHAGIA: Status: ACTIVE | Noted: 2024-12-26

## 2024-12-26 LAB
ABO GROUP BLD: NORMAL
B-HCG UR QL: NEGATIVE
EXPIRATION DATE: ABNORMAL
GLUCOSE BLDC GLUCOMTR-MCNC: 121 MG/DL (ref 70–130)
INTERNAL NEGATIVE CONTROL: ABNORMAL
INTERNAL POSITIVE CONTROL: ABNORMAL
Lab: ABNORMAL
RH BLD: POSITIVE

## 2024-12-26 PROCEDURE — 88307 TISSUE EXAM BY PATHOLOGIST: CPT | Performed by: OBSTETRICS & GYNECOLOGY

## 2024-12-26 PROCEDURE — 82948 REAGENT STRIP/BLOOD GLUCOSE: CPT

## 2024-12-26 PROCEDURE — 25810000003 LACTATED RINGERS PER 1000 ML

## 2024-12-26 PROCEDURE — 81025 URINE PREGNANCY TEST: CPT | Performed by: ANESTHESIOLOGY

## 2024-12-26 PROCEDURE — 25010000002 LIDOCAINE PF 1% 1 % SOLUTION

## 2024-12-26 PROCEDURE — 25010000002 KETOROLAC TROMETHAMINE PER 15 MG

## 2024-12-26 PROCEDURE — 25010000002 FENTANYL CITRATE (PF) 100 MCG/2ML SOLUTION

## 2024-12-26 PROCEDURE — 86901 BLOOD TYPING SEROLOGIC RH(D): CPT

## 2024-12-26 PROCEDURE — 25010000002 FENTANYL CITRATE (PF) 50 MCG/ML SOLUTION

## 2024-12-26 PROCEDURE — 25010000002 LIDOCAINE PF 1% 1 % SOLUTION: Performed by: ANESTHESIOLOGY

## 2024-12-26 PROCEDURE — 25010000002 CLINDAMYCIN 900 MG/50ML SOLUTION: Performed by: OBSTETRICS & GYNECOLOGY

## 2024-12-26 PROCEDURE — 25010000002 DEXAMETHASONE PER 1 MG

## 2024-12-26 PROCEDURE — 94660 CPAP INITIATION&MGMT: CPT

## 2024-12-26 PROCEDURE — 25010000002 PROPOFOL 10 MG/ML EMULSION

## 2024-12-26 PROCEDURE — 25010000002 SUGAMMADEX 200 MG/2ML SOLUTION

## 2024-12-26 PROCEDURE — 25810000003 LACTATED RINGERS PER 1000 ML: Performed by: OBSTETRICS & GYNECOLOGY

## 2024-12-26 PROCEDURE — 25010000002 ONDANSETRON PER 1 MG

## 2024-12-26 PROCEDURE — 86900 BLOOD TYPING SEROLOGIC ABO: CPT

## 2024-12-26 PROCEDURE — 25010000002 KETOROLAC TROMETHAMINE PER 15 MG: Performed by: OBSTETRICS & GYNECOLOGY

## 2024-12-26 PROCEDURE — 25810000003 SODIUM CHLORIDE PER 500 ML: Performed by: OBSTETRICS & GYNECOLOGY

## 2024-12-26 PROCEDURE — 25010000002 GENTAMICIN PER 80 MG: Performed by: OBSTETRICS & GYNECOLOGY

## 2024-12-26 PROCEDURE — 25810000003 LACTATED RINGERS PER 1000 ML: Performed by: ANESTHESIOLOGY

## 2024-12-26 PROCEDURE — 94799 UNLISTED PULMONARY SVC/PX: CPT

## 2024-12-26 RX ORDER — ONDANSETRON 2 MG/ML
4 INJECTION INTRAMUSCULAR; INTRAVENOUS ONCE AS NEEDED
Status: COMPLETED | OUTPATIENT
Start: 2024-12-26 | End: 2024-12-26

## 2024-12-26 RX ORDER — DEXAMETHASONE SODIUM PHOSPHATE 4 MG/ML
INJECTION, SOLUTION INTRA-ARTICULAR; INTRALESIONAL; INTRAMUSCULAR; INTRAVENOUS; SOFT TISSUE AS NEEDED
Status: DISCONTINUED | OUTPATIENT
Start: 2024-12-26 | End: 2024-12-26 | Stop reason: SURG

## 2024-12-26 RX ORDER — PROCHLORPERAZINE EDISYLATE 5 MG/ML
10 INJECTION INTRAMUSCULAR; INTRAVENOUS ONCE AS NEEDED
Status: DISCONTINUED | OUTPATIENT
Start: 2024-12-26 | End: 2024-12-26 | Stop reason: HOSPADM

## 2024-12-26 RX ORDER — IBUPROFEN 600 MG/1
600 TABLET, FILM COATED ORAL EVERY 6 HOURS SCHEDULED
Status: DISCONTINUED | OUTPATIENT
Start: 2024-12-27 | End: 2024-12-27 | Stop reason: HOSPADM

## 2024-12-26 RX ORDER — PROPOFOL 10 MG/ML
VIAL (ML) INTRAVENOUS AS NEEDED
Status: DISCONTINUED | OUTPATIENT
Start: 2024-12-26 | End: 2024-12-26 | Stop reason: SURG

## 2024-12-26 RX ORDER — IPRATROPIUM BROMIDE AND ALBUTEROL SULFATE 2.5; .5 MG/3ML; MG/3ML
3 SOLUTION RESPIRATORY (INHALATION) ONCE AS NEEDED
Status: DISCONTINUED | OUTPATIENT
Start: 2024-12-26 | End: 2024-12-26 | Stop reason: HOSPADM

## 2024-12-26 RX ORDER — ENOXAPARIN SODIUM 100 MG/ML
40 INJECTION SUBCUTANEOUS DAILY
Status: DISCONTINUED | OUTPATIENT
Start: 2024-12-27 | End: 2024-12-27 | Stop reason: HOSPADM

## 2024-12-26 RX ORDER — ONDANSETRON 2 MG/ML
INJECTION INTRAMUSCULAR; INTRAVENOUS AS NEEDED
Status: DISCONTINUED | OUTPATIENT
Start: 2024-12-26 | End: 2024-12-26 | Stop reason: SURG

## 2024-12-26 RX ORDER — LIDOCAINE HYDROCHLORIDE 10 MG/ML
0.5 INJECTION, SOLUTION EPIDURAL; INFILTRATION; INTRACAUDAL; PERINEURAL ONCE AS NEEDED
Status: COMPLETED | OUTPATIENT
Start: 2024-12-26 | End: 2024-12-26

## 2024-12-26 RX ORDER — SODIUM CHLORIDE 9 MG/ML
INJECTION, SOLUTION INTRAVENOUS AS NEEDED
Status: DISCONTINUED | OUTPATIENT
Start: 2024-12-26 | End: 2024-12-26 | Stop reason: HOSPADM

## 2024-12-26 RX ORDER — METFORMIN HYDROCHLORIDE 500 MG/1
1000 TABLET, EXTENDED RELEASE ORAL 2 TIMES DAILY WITH MEALS
Status: DISCONTINUED | OUTPATIENT
Start: 2024-12-26 | End: 2024-12-27 | Stop reason: HOSPADM

## 2024-12-26 RX ORDER — BUPIVACAINE HYDROCHLORIDE AND EPINEPHRINE 5; 5 MG/ML; UG/ML
INJECTION, SOLUTION PERINEURAL AS NEEDED
Status: DISCONTINUED | OUTPATIENT
Start: 2024-12-26 | End: 2024-12-26 | Stop reason: HOSPADM

## 2024-12-26 RX ORDER — DIPHENHYDRAMINE HYDROCHLORIDE 50 MG/ML
25 INJECTION INTRAMUSCULAR; INTRAVENOUS NIGHTLY PRN
Status: DISCONTINUED | OUTPATIENT
Start: 2024-12-26 | End: 2024-12-27 | Stop reason: HOSPADM

## 2024-12-26 RX ORDER — ONDANSETRON 2 MG/ML
INJECTION INTRAMUSCULAR; INTRAVENOUS
Status: COMPLETED
Start: 2024-12-26 | End: 2024-12-26

## 2024-12-26 RX ORDER — KETOROLAC TROMETHAMINE 30 MG/ML
INJECTION, SOLUTION INTRAMUSCULAR; INTRAVENOUS AS NEEDED
Status: DISCONTINUED | OUTPATIENT
Start: 2024-12-26 | End: 2024-12-26 | Stop reason: SURG

## 2024-12-26 RX ORDER — LABETALOL HYDROCHLORIDE 5 MG/ML
5 INJECTION, SOLUTION INTRAVENOUS
Status: DISCONTINUED | OUTPATIENT
Start: 2024-12-26 | End: 2024-12-26 | Stop reason: HOSPADM

## 2024-12-26 RX ORDER — SODIUM CHLORIDE, SODIUM LACTATE, POTASSIUM CHLORIDE, CALCIUM CHLORIDE 600; 310; 30; 20 MG/100ML; MG/100ML; MG/100ML; MG/100ML
100 INJECTION, SOLUTION INTRAVENOUS CONTINUOUS
Status: DISCONTINUED | OUTPATIENT
Start: 2024-12-26 | End: 2024-12-27 | Stop reason: HOSPADM

## 2024-12-26 RX ORDER — FENTANYL CITRATE 50 UG/ML
50 INJECTION, SOLUTION INTRAMUSCULAR; INTRAVENOUS
Status: DISCONTINUED | OUTPATIENT
Start: 2024-12-26 | End: 2024-12-26 | Stop reason: HOSPADM

## 2024-12-26 RX ORDER — ACETAMINOPHEN 500 MG
1000 TABLET ORAL ONCE
Status: COMPLETED | OUTPATIENT
Start: 2024-12-26 | End: 2024-12-26

## 2024-12-26 RX ORDER — SODIUM CHLORIDE 0.9 % (FLUSH) 0.9 %
3 SYRINGE (ML) INJECTION EVERY 12 HOURS SCHEDULED
Status: DISCONTINUED | OUTPATIENT
Start: 2024-12-26 | End: 2024-12-26 | Stop reason: HOSPADM

## 2024-12-26 RX ORDER — DIPHENHYDRAMINE HCL 25 MG
25 CAPSULE ORAL NIGHTLY PRN
Status: DISCONTINUED | OUTPATIENT
Start: 2024-12-26 | End: 2024-12-27 | Stop reason: HOSPADM

## 2024-12-26 RX ORDER — ONDANSETRON 4 MG/1
4 TABLET, ORALLY DISINTEGRATING ORAL EVERY 6 HOURS PRN
Status: DISCONTINUED | OUTPATIENT
Start: 2024-12-26 | End: 2024-12-27 | Stop reason: HOSPADM

## 2024-12-26 RX ORDER — HYDRALAZINE HYDROCHLORIDE 20 MG/ML
5 INJECTION INTRAMUSCULAR; INTRAVENOUS
Status: DISCONTINUED | OUTPATIENT
Start: 2024-12-26 | End: 2024-12-26 | Stop reason: HOSPADM

## 2024-12-26 RX ORDER — MEPERIDINE HYDROCHLORIDE 25 MG/ML
12.5 INJECTION INTRAMUSCULAR; INTRAVENOUS; SUBCUTANEOUS
Status: DISCONTINUED | OUTPATIENT
Start: 2024-12-26 | End: 2024-12-26 | Stop reason: HOSPADM

## 2024-12-26 RX ORDER — KETOROLAC TROMETHAMINE 15 MG/ML
15 INJECTION, SOLUTION INTRAMUSCULAR; INTRAVENOUS EVERY 6 HOURS
Status: DISCONTINUED | OUTPATIENT
Start: 2024-12-26 | End: 2024-12-27 | Stop reason: HOSPADM

## 2024-12-26 RX ORDER — GABAPENTIN 300 MG/1
600 CAPSULE ORAL ONCE
Status: COMPLETED | OUTPATIENT
Start: 2024-12-26 | End: 2024-12-26

## 2024-12-26 RX ORDER — PROMETHAZINE HYDROCHLORIDE 12.5 MG/1
12.5 TABLET ORAL EVERY 6 HOURS PRN
Status: DISCONTINUED | OUTPATIENT
Start: 2024-12-26 | End: 2024-12-27 | Stop reason: HOSPADM

## 2024-12-26 RX ORDER — HYDROMORPHONE HYDROCHLORIDE 1 MG/ML
0.5 INJECTION, SOLUTION INTRAMUSCULAR; INTRAVENOUS; SUBCUTANEOUS
Status: DISCONTINUED | OUTPATIENT
Start: 2024-12-26 | End: 2024-12-26 | Stop reason: HOSPADM

## 2024-12-26 RX ORDER — SODIUM CHLORIDE, SODIUM LACTATE, POTASSIUM CHLORIDE, CALCIUM CHLORIDE 600; 310; 30; 20 MG/100ML; MG/100ML; MG/100ML; MG/100ML
INJECTION, SOLUTION INTRAVENOUS CONTINUOUS PRN
Status: DISCONTINUED | OUTPATIENT
Start: 2024-12-26 | End: 2024-12-26 | Stop reason: SURG

## 2024-12-26 RX ORDER — SODIUM CHLORIDE 0.9 % (FLUSH) 0.9 %
3-10 SYRINGE (ML) INJECTION AS NEEDED
Status: DISCONTINUED | OUTPATIENT
Start: 2024-12-26 | End: 2024-12-26 | Stop reason: HOSPADM

## 2024-12-26 RX ORDER — PROMETHAZINE HYDROCHLORIDE 25 MG/1
25 SUPPOSITORY RECTAL ONCE AS NEEDED
Status: DISCONTINUED | OUTPATIENT
Start: 2024-12-26 | End: 2024-12-26 | Stop reason: HOSPADM

## 2024-12-26 RX ORDER — ONDANSETRON 2 MG/ML
4 INJECTION INTRAMUSCULAR; INTRAVENOUS EVERY 6 HOURS PRN
Status: DISCONTINUED | OUTPATIENT
Start: 2024-12-26 | End: 2024-12-27 | Stop reason: HOSPADM

## 2024-12-26 RX ORDER — PROMETHAZINE HYDROCHLORIDE 25 MG/1
25 TABLET ORAL ONCE AS NEEDED
Status: DISCONTINUED | OUTPATIENT
Start: 2024-12-26 | End: 2024-12-26 | Stop reason: HOSPADM

## 2024-12-26 RX ORDER — LAMOTRIGINE 100 MG/1
200 TABLET ORAL NIGHTLY
Status: DISCONTINUED | OUTPATIENT
Start: 2024-12-26 | End: 2024-12-27 | Stop reason: HOSPADM

## 2024-12-26 RX ORDER — SODIUM CHLORIDE, SODIUM LACTATE, POTASSIUM CHLORIDE, CALCIUM CHLORIDE 600; 310; 30; 20 MG/100ML; MG/100ML; MG/100ML; MG/100ML
9 INJECTION, SOLUTION INTRAVENOUS CONTINUOUS
Status: DISCONTINUED | OUTPATIENT
Start: 2024-12-26 | End: 2024-12-27 | Stop reason: HOSPADM

## 2024-12-26 RX ORDER — SODIUM CHLORIDE 9 MG/ML
9 INJECTION, SOLUTION INTRAVENOUS AS NEEDED
Status: DISCONTINUED | OUTPATIENT
Start: 2024-12-26 | End: 2024-12-26 | Stop reason: HOSPADM

## 2024-12-26 RX ORDER — NALOXONE HCL 0.4 MG/ML
0.4 VIAL (ML) INJECTION AS NEEDED
Status: DISCONTINUED | OUTPATIENT
Start: 2024-12-26 | End: 2024-12-26 | Stop reason: HOSPADM

## 2024-12-26 RX ORDER — LIDOCAINE HYDROCHLORIDE 10 MG/ML
INJECTION, SOLUTION EPIDURAL; INFILTRATION; INTRACAUDAL; PERINEURAL AS NEEDED
Status: DISCONTINUED | OUTPATIENT
Start: 2024-12-26 | End: 2024-12-26 | Stop reason: SURG

## 2024-12-26 RX ORDER — ROCURONIUM BROMIDE 10 MG/ML
INJECTION, SOLUTION INTRAVENOUS AS NEEDED
Status: DISCONTINUED | OUTPATIENT
Start: 2024-12-26 | End: 2024-12-26 | Stop reason: SURG

## 2024-12-26 RX ORDER — FENTANYL CITRATE 50 UG/ML
INJECTION, SOLUTION INTRAMUSCULAR; INTRAVENOUS
Status: COMPLETED
Start: 2024-12-26 | End: 2024-12-26

## 2024-12-26 RX ORDER — EPHEDRINE SULFATE 50 MG/ML
INJECTION INTRAVENOUS AS NEEDED
Status: DISCONTINUED | OUTPATIENT
Start: 2024-12-26 | End: 2024-12-26 | Stop reason: SURG

## 2024-12-26 RX ORDER — FAMOTIDINE 20 MG/1
20 TABLET, FILM COATED ORAL
Status: COMPLETED | OUTPATIENT
Start: 2024-12-26 | End: 2024-12-26

## 2024-12-26 RX ORDER — SODIUM CHLORIDE, SODIUM LACTATE, POTASSIUM CHLORIDE, CALCIUM CHLORIDE 600; 310; 30; 20 MG/100ML; MG/100ML; MG/100ML; MG/100ML
9 INJECTION, SOLUTION INTRAVENOUS ONCE
Status: COMPLETED | OUTPATIENT
Start: 2024-12-26 | End: 2024-12-26

## 2024-12-26 RX ORDER — FENTANYL CITRATE 50 UG/ML
INJECTION, SOLUTION INTRAMUSCULAR; INTRAVENOUS AS NEEDED
Status: DISCONTINUED | OUTPATIENT
Start: 2024-12-26 | End: 2024-12-26 | Stop reason: SURG

## 2024-12-26 RX ORDER — CLINDAMYCIN PHOSPHATE 900 MG/50ML
900 INJECTION, SOLUTION INTRAVENOUS ONCE
Status: COMPLETED | OUTPATIENT
Start: 2024-12-26 | End: 2024-12-26

## 2024-12-26 RX ORDER — HYDROCODONE BITARTRATE AND ACETAMINOPHEN 5; 325 MG/1; MG/1
1 TABLET ORAL ONCE AS NEEDED
Status: DISCONTINUED | OUTPATIENT
Start: 2024-12-26 | End: 2024-12-26 | Stop reason: HOSPADM

## 2024-12-26 RX ORDER — SUCCINYLCHOLINE/SOD CL,ISO/PF 200MG/10ML
SYRINGE (ML) INTRAVENOUS AS NEEDED
Status: DISCONTINUED | OUTPATIENT
Start: 2024-12-26 | End: 2024-12-26 | Stop reason: SURG

## 2024-12-26 RX ORDER — SCOLOPAMINE TRANSDERMAL SYSTEM 1 MG/1
1 PATCH, EXTENDED RELEASE TRANSDERMAL CONTINUOUS
Status: DISCONTINUED | OUTPATIENT
Start: 2024-12-26 | End: 2024-12-27 | Stop reason: HOSPADM

## 2024-12-26 RX ORDER — GABAPENTIN 100 MG/1
100 CAPSULE ORAL 3 TIMES DAILY
Status: DISCONTINUED | OUTPATIENT
Start: 2024-12-26 | End: 2024-12-27 | Stop reason: HOSPADM

## 2024-12-26 RX ORDER — BUPIVACAINE HCL/0.9 % NACL/PF 0.125 %
PLASTIC BAG, INJECTION (ML) EPIDURAL AS NEEDED
Status: DISCONTINUED | OUTPATIENT
Start: 2024-12-26 | End: 2024-12-26 | Stop reason: SURG

## 2024-12-26 RX ORDER — PROMETHAZINE HYDROCHLORIDE 12.5 MG/1
12.5 SUPPOSITORY RECTAL EVERY 6 HOURS PRN
Status: DISCONTINUED | OUTPATIENT
Start: 2024-12-26 | End: 2024-12-27 | Stop reason: HOSPADM

## 2024-12-26 RX ORDER — OXYCODONE HYDROCHLORIDE 5 MG/1
5 TABLET ORAL EVERY 4 HOURS PRN
Status: DISCONTINUED | OUTPATIENT
Start: 2024-12-26 | End: 2024-12-27 | Stop reason: HOSPADM

## 2024-12-26 RX ADMIN — SODIUM CHLORIDE, POTASSIUM CHLORIDE, SODIUM LACTATE AND CALCIUM CHLORIDE: 600; 310; 30; 20 INJECTION, SOLUTION INTRAVENOUS at 13:43

## 2024-12-26 RX ADMIN — ONDANSETRON 4 MG: 2 INJECTION INTRAMUSCULAR; INTRAVENOUS at 15:51

## 2024-12-26 RX ADMIN — FENTANYL CITRATE 50 MCG: 50 INJECTION, SOLUTION INTRAMUSCULAR; INTRAVENOUS at 16:23

## 2024-12-26 RX ADMIN — FENTANYL CITRATE 50 MCG: 50 INJECTION, SOLUTION INTRAMUSCULAR; INTRAVENOUS at 15:49

## 2024-12-26 RX ADMIN — FAMOTIDINE 20 MG: 20 TABLET, FILM COATED ORAL at 11:00

## 2024-12-26 RX ADMIN — Medication 100 MCG: at 14:19

## 2024-12-26 RX ADMIN — IBUPROFEN 600 MG: 600 TABLET, FILM COATED ORAL at 23:44

## 2024-12-26 RX ADMIN — Medication 200 MCG: at 14:03

## 2024-12-26 RX ADMIN — ROCURONIUM BROMIDE 5 MG: 10 INJECTION INTRAVENOUS at 13:48

## 2024-12-26 RX ADMIN — DEXAMETHASONE SODIUM PHOSPHATE 8 MG: 4 INJECTION INTRA-ARTICULAR; INTRALESIONAL; INTRAMUSCULAR; INTRAVENOUS; SOFT TISSUE at 14:06

## 2024-12-26 RX ADMIN — Medication 200 MCG: at 14:07

## 2024-12-26 RX ADMIN — Medication 100 MCG: at 14:29

## 2024-12-26 RX ADMIN — GABAPENTIN 100 MG: 100 CAPSULE ORAL at 17:52

## 2024-12-26 RX ADMIN — ACETAMINOPHEN 1000 MG: 500 TABLET, FILM COATED ORAL at 11:00

## 2024-12-26 RX ADMIN — GENTAMICIN SULFATE 370 MG: 40 INJECTION, SOLUTION INTRAMUSCULAR; INTRAVENOUS at 13:59

## 2024-12-26 RX ADMIN — KETOROLAC TROMETHAMINE 30 MG: 30 INJECTION, SOLUTION INTRAMUSCULAR; INTRAVENOUS at 14:59

## 2024-12-26 RX ADMIN — SODIUM CHLORIDE, POTASSIUM CHLORIDE, SODIUM LACTATE AND CALCIUM CHLORIDE 100 ML/HR: 600; 310; 30; 20 INJECTION, SOLUTION INTRAVENOUS at 17:52

## 2024-12-26 RX ADMIN — KETOROLAC TROMETHAMINE 15 MG: 15 INJECTION, SOLUTION INTRAMUSCULAR; INTRAVENOUS at 21:26

## 2024-12-26 RX ADMIN — SCOPOLAMINE 1 PATCH: 1.5 PATCH, EXTENDED RELEASE TRANSDERMAL at 11:00

## 2024-12-26 RX ADMIN — LIDOCAINE HYDROCHLORIDE 0.5 ML: 10 INJECTION, SOLUTION EPIDURAL; INFILTRATION; INTRACAUDAL; PERINEURAL at 11:00

## 2024-12-26 RX ADMIN — LAMOTRIGINE 200 MG: 100 TABLET ORAL at 21:26

## 2024-12-26 RX ADMIN — Medication 200 MCG: at 13:57

## 2024-12-26 RX ADMIN — EPHEDRINE SULFATE 10 MG: 50 INJECTION INTRAVENOUS at 14:30

## 2024-12-26 RX ADMIN — GABAPENTIN 600 MG: 300 CAPSULE ORAL at 11:00

## 2024-12-26 RX ADMIN — LIDOCAINE HYDROCHLORIDE 50 MG: 10 INJECTION, SOLUTION EPIDURAL; INFILTRATION; INTRACAUDAL; PERINEURAL at 13:48

## 2024-12-26 RX ADMIN — SODIUM CHLORIDE, POTASSIUM CHLORIDE, SODIUM LACTATE AND CALCIUM CHLORIDE: 600; 310; 30; 20 INJECTION, SOLUTION INTRAVENOUS at 14:29

## 2024-12-26 RX ADMIN — ROCURONIUM BROMIDE 95 MG: 10 INJECTION INTRAVENOUS at 13:54

## 2024-12-26 RX ADMIN — SODIUM CHLORIDE, POTASSIUM CHLORIDE, SODIUM LACTATE AND CALCIUM CHLORIDE 9 ML/HR: 600; 310; 30; 20 INJECTION, SOLUTION INTRAVENOUS at 11:00

## 2024-12-26 RX ADMIN — Medication 200 MG: at 13:48

## 2024-12-26 RX ADMIN — ONDANSETRON 4 MG: 2 INJECTION INTRAMUSCULAR; INTRAVENOUS at 14:59

## 2024-12-26 RX ADMIN — FENTANYL CITRATE 100 MCG: 50 INJECTION, SOLUTION INTRAMUSCULAR; INTRAVENOUS at 13:48

## 2024-12-26 RX ADMIN — PROPOFOL 200 MG: 10 INJECTION, EMULSION INTRAVENOUS at 13:48

## 2024-12-26 RX ADMIN — CLINDAMYCIN PHOSPHATE 900 MG: 900 INJECTION, SOLUTION INTRAVENOUS at 13:50

## 2024-12-26 RX ADMIN — Medication 200 MCG: at 13:53

## 2024-12-26 RX ADMIN — SUGAMMADEX 200 MG: 100 INJECTION, SOLUTION INTRAVENOUS at 14:59

## 2024-12-26 RX ADMIN — METFORMIN HYDROCHLORIDE 1000 MG: 500 TABLET, EXTENDED RELEASE ORAL at 21:26

## 2024-12-26 NOTE — ANESTHESIA POSTPROCEDURE EVALUATION
Patient: Emily Heller    Procedure Summary       Date: 12/26/24 [facility] / [facility]:  [facility]    Anesthesia Start: 1343 Anesthesia Stop:     Procedure: TOTAL LAPAROSCOPIC HYSTERECTOMY BILATERAL SALPINGO OOPHORECTOMY (Abdomen) Diagnosis:       Menorrhagia with irregular cycle      Menorrhagia      (Menorrhagia with irregular cycle [N92.1])    Surgeons: Amish Leavitt MD Provider: Kevin Rollins MD    Anesthesia Type: general ASA Status: 3            Anesthesia Type: general    Vitals  Vitals Value Taken Time   /52 12/26/24 1515   Temp 98.1    Pulse 80 12/26/24 1518   Resp 16    SpO2 94 % 12/26/24 1518   Vitals shown include unfiled device data.        Post Anesthesia Care and Evaluation    Patient location during evaluation: PACU  Patient participation: complete - patient participated  Level of consciousness: awake and alert  Pain management: adequate    Airway patency: patent  Anesthetic complications: No anesthetic complications  PONV Status: none  Cardiovascular status: hemodynamically stable and acceptable  Respiratory status: nonlabored ventilation, acceptable and nasal cannula  Hydration status: acceptable

## 2024-12-26 NOTE — INTERVAL H&P NOTE
"[facility] Pre-op    Full history and physical note from office is attached.    /73 (BP Location: Left arm, Patient Position: Lying)   Pulse 98   Temp 97.3 °F (36.3 °C)   Resp 16   Ht 165.1 cm (65\")   Wt 96.2 kg (212 lb)   SpO2 99%   BMI 35.28 kg/m²     Review of Systems:  Constitutional-- No fever, chills or sweats. No fatigue.  CV-- No chest pain, palpitation or syncope  Resp-- No SOB, cough, hemoptysis  Skin--No rashes or lesions    Physical Exam:  Heart:   Regular rate and rhythm, S1 and S2 normal  Lungs: Clear to auscultation bilaterally, respirations unlabored    LAB Results:  Lab Results   Component Value Date    WBC 14.08 (H) 12/23/2024    HGB 10.7 (L) 12/23/2024    HCT 32.7 (L) 12/23/2024    MCV 85.8 12/23/2024     12/23/2024    NEUTROABS 10.41 (H) 12/23/2024    GLUCOSE 133 (H) 12/23/2024    BUN 12 12/23/2024    CREATININE 0.76 12/23/2024    EGFRIFNONA 77 08/17/2021     12/23/2024    K 4.2 12/23/2024     12/23/2024    CO2 21.0 (L) 12/23/2024    MG 1.7 11/20/2024    CALCIUM 9.4 12/23/2024    ALBUMIN 4.1 12/23/2024    AST 16 12/23/2024    ALT 9 12/23/2024    BILITOT 0.3 12/23/2024       Cancer Staging (if applicable)  Cancer Patient: __ yes __no __unknown__N/A; If yes, clinical stage T:__ N:__M:__, stage group or __N/A      Impression: Menorrhagia with irregular cycle       Plan: TOTAL LAPAROSCOPIC HYSTERECTOMY BILATERAL SALPINGO OOPHORECTOMY       Mi Puentes, VALENCIA   12/26/2024   10:43 EST  "

## 2024-12-26 NOTE — ANESTHESIA PREPROCEDURE EVALUATION
Anesthesia Evaluation                  Airway   Mallampati: I  TM distance: >3 FB  Neck ROM: full  No difficulty expected  Dental      Pulmonary    (+) asthma,sleep apnea  Cardiovascular     ECG reviewed    (+) hypertension      Neuro/Psych  (+) headaches, dizziness/light headedness, numbness  GI/Hepatic/Renal/Endo    (+) obesity, diabetes mellitus    Musculoskeletal     Abdominal    Substance History      OB/GYN          Other   arthritis,   history of cancer                Anesthesia Plan    ASA 3     general   Rapid sequence  (Semaglutide 12/20)  intravenous induction     Anesthetic plan, risks, benefits, and alternatives have been provided, discussed and informed consent has been obtained with: patient.    Plan discussed with CRNA.    CODE STATUS:

## 2024-12-26 NOTE — ANESTHESIA PROCEDURE NOTES
Airway  Urgency: elective    Date/Time: 12/26/2024 1:49 PM  Airway not difficult    General Information and Staff    Patient location during procedure: OR    Indications and Patient Condition  Indications for airway management: airway protection    Preoxygenated: yes  MILS maintained throughout  Mask difficulty assessment: 0 - not attempted    Final Airway Details  Final airway type: endotracheal airway      Successful airway: ETT  Cuffed: yes   Successful intubation technique: video laryngoscopy and RSI  Facilitating devices/methods: intubating stylet and cricoid pressure  Endotracheal tube insertion site: oral  Blade: Ford  Blade size: 3  ETT size (mm): 7.0  Cormack-Lehane Classification: grade I - full view of glottis  Placement verified by: chest auscultation and capnometry   Measured from: lips  ETT/EBT  to lips (cm): 21  Number of attempts at approach: 1  Assessment: lips, teeth, and gum same as pre-op and atraumatic intubation

## 2024-12-26 NOTE — OP NOTE
TOTAL LAPAROSCOPIC HYSTERECTOMY BILATERAL SALPINGO OOPHORECTOMY  Procedure Note    Emily Heller  12/26/2024    Pre-op Diagnosis:   Menorrhagia with irregular cycle [N92.1]    Post-op Diagnosis:     Post-Op Diagnosis Codes:     * Menorrhagia with irregular cycle [N92.1]     * Menorrhagia [N92.0]    Procedure(s):  TOTAL LAPAROSCOPIC HYSTERECTOMY BILATERAL SALPINGO OOPHORECTOMY    Surgeon(s):  Amish Leavitt MD Carbajal, Tracey Owensby, MD 1st assistant:   was responsible for performing the following activities: Retraction, Suction, and Irrigation and their skilled assistance was necessary for the success of this case.     Anesthesia: General    Staff:   Circulator: Farrah Gray RN; Flores Hinojosa RN  Scrub Person: Massiel De Paz; Landy Heller; Kenzie Vigil    Estimated Blood Loss:  150ml    Specimens:                Order Name Source Comment Collection Info Order Time   ABO/RH SPECIMEN VERIFICATION PREOP   Collected By: Elisa Perez RN 12/26/2024 10:17 AM   TISSUE PATHOLOGY EXAM Uterus with Cervix, Bilateral Tubes and Ovaries  Collected By: Amish Leavitt MD 12/26/2024  2:40 PM     Release to patient   Routine Release              Drains:   Urethral Catheter Non-latex;Silicone 16 Fr. (Active)       Indications: Menorrhagia    Findings: Enlarged fibroid uterus    Operative procedure: The patient was taken to the operating room where general anesthesia was found to be adequate.  The patient was placed in dorsal 5 position with legs lithotomy stirrups.  The patient was prepped draped in sterile fashion.  A Chapman catheter was anchored and weighted speculum was then placed in the patient's vagina visualize the cervix grasped with a single-tooth tenaculum.  You manipulator was then placed and the cervical cup was then sewn into position.  Gloves were changed and attention was then turned to the patient's abdomen.  Local anesthetic was injected infraumbilically and infraumbilical  incision was then made.  The Optiview scope position used to enter the peritoneal cavity direct visualization.  Pneumoperitoneum was then obtained patient was placed in Trendelenburg position.  The patient's upper abdomen lower pelvis were then surveyed.  The patient was found to have bilateral normal fallopian tubes the uterus was enlarged with a fibroid posteriorly and the patient's left ovary contained a smooth-walled cyst.  Bilateral 5 mm ports were placed under direct visualization.  The patient's left infundibulopelvic ligament was then isolated clamped cauterized and cut using LigaSure.  The patient is left round ligament was then clamped cauterized and cut in a similar fashion.  The broad ligament was then incised anteriorly and posteriorly creating a bladder flap anteriorly and skeletonizing the uterine artery.  The patient's right infundibulopelvic ligament was then isolated clamped cauterized and cut in a similar fashion.  The patient's right leg was then clamped cauterized and cut.  The patient's broad ligament was then incised anteriorly and posteriorly creating a bladder flap anteriorly and skeletonizing the uterine artery.  Bilateral uterine artery was then clamped cauterized and cut.  The patient is cervix was then removed in the patient's vagina using electrocautery.  The specimen was then removed with the patient's vagina.  Bilateral ureters were visualized peristalsing.  Attention was into the patient's vagina and the vaginal cuff was then closed with a running hemostatic stitch of 0 Vicryl suture.  Pneumostasis was noted.  Gloves were changed once again and attention was then turned to patient's abdomen.  Pneumoperitoneum was then obtained to the patient's pelvis and copiously irrigated cleared of all clot and debris.  Bilateral ureters were visualized peristalsing and good hemostasis noted the vaginal cuff.  All instruments were removed patient's abdomen.  Pneumoperitoneum was then relieved and  the skin incisions were closed with 3-0 Vicryl followed by skin glue.  All needle lap counts were correct.  The patient tolerated the procedure well and was taken recovery in stable condition.    Complications: none      Amish Leavitt MD     Date: 12/26/2024  Time: 15:05 EST

## 2024-12-26 NOTE — PLAN OF CARE
Problem: Adult Inpatient Plan of Care  Goal: Optimal Comfort and Wellbeing  Outcome: Progressing  Intervention: Monitor Pain and Promote Comfort  Recent Flowsheet Documentation  Taken 12/26/2024 1702 by Brody Horne, RN  Pain Management Interventions: pain management plan reviewed with patient/caregiver  Intervention: Provide Person-Centered Care  Recent Flowsheet Documentation  Taken 12/26/2024 1702 by Brody Horne, RN  Trust Relationship/Rapport:   care explained   choices provided   Goal Outcome Evaluation:  Plan of Care Reviewed With: patient        Progress: improving     Pain controlled. No nausea. Chapman in place with clear yellow urine. Tolerating PO. SCDs in place.  at bedside. Sleepy but arouses to voice. Chapman planned to be removed at 0600 per orders.

## 2024-12-27 VITALS
DIASTOLIC BLOOD PRESSURE: 62 MMHG | WEIGHT: 212 LBS | OXYGEN SATURATION: 97 % | SYSTOLIC BLOOD PRESSURE: 99 MMHG | RESPIRATION RATE: 18 BRPM | HEIGHT: 65 IN | HEART RATE: 93 BPM | BODY MASS INDEX: 35.32 KG/M2 | TEMPERATURE: 97.5 F

## 2024-12-27 LAB
ANION GAP SERPL CALCULATED.3IONS-SCNC: 11 MMOL/L (ref 5–15)
BUN SERPL-MCNC: 15 MG/DL (ref 6–20)
BUN/CREAT SERPL: 16.3 (ref 7–25)
CALCIUM SPEC-SCNC: 9.5 MG/DL (ref 8.6–10.5)
CHLORIDE SERPL-SCNC: 100 MMOL/L (ref 98–107)
CO2 SERPL-SCNC: 25 MMOL/L (ref 22–29)
CREAT SERPL-MCNC: 0.92 MG/DL (ref 0.57–1)
DEPRECATED RDW RBC AUTO: 44.9 FL (ref 37–54)
EGFRCR SERPLBLD CKD-EPI 2021: 74.6 ML/MIN/1.73
ERYTHROCYTE [DISTWIDTH] IN BLOOD BY AUTOMATED COUNT: 13.8 % (ref 12.3–15.4)
GLUCOSE SERPL-MCNC: 161 MG/DL (ref 65–99)
HCT VFR BLD AUTO: 31.7 % (ref 34–46.6)
HGB BLD-MCNC: 9.9 G/DL (ref 12–15.9)
MCH RBC QN AUTO: 28.4 PG (ref 26.6–33)
MCHC RBC AUTO-ENTMCNC: 31.2 G/DL (ref 31.5–35.7)
MCV RBC AUTO: 90.8 FL (ref 79–97)
PLATELET # BLD AUTO: 380 10*3/MM3 (ref 140–450)
PMV BLD AUTO: 8.9 FL (ref 6–12)
POTASSIUM SERPL-SCNC: 4.5 MMOL/L (ref 3.5–5.2)
RBC # BLD AUTO: 3.49 10*6/MM3 (ref 3.77–5.28)
SODIUM SERPL-SCNC: 136 MMOL/L (ref 136–145)
WBC NRBC COR # BLD AUTO: 14.91 10*3/MM3 (ref 3.4–10.8)

## 2024-12-27 PROCEDURE — 85027 COMPLETE CBC AUTOMATED: CPT | Performed by: OBSTETRICS & GYNECOLOGY

## 2024-12-27 PROCEDURE — 94660 CPAP INITIATION&MGMT: CPT

## 2024-12-27 PROCEDURE — 80048 BASIC METABOLIC PNL TOTAL CA: CPT | Performed by: OBSTETRICS & GYNECOLOGY

## 2024-12-27 PROCEDURE — 94799 UNLISTED PULMONARY SVC/PX: CPT

## 2024-12-27 PROCEDURE — 94762 N-INVAS EAR/PLS OXIMTRY CONT: CPT

## 2024-12-27 PROCEDURE — 25010000002 KETOROLAC TROMETHAMINE PER 15 MG: Performed by: OBSTETRICS & GYNECOLOGY

## 2024-12-27 PROCEDURE — 25010000002 ENOXAPARIN PER 10 MG: Performed by: OBSTETRICS & GYNECOLOGY

## 2024-12-27 RX ORDER — IBUPROFEN 600 MG/1
600 TABLET, FILM COATED ORAL EVERY 6 HOURS PRN
Qty: 30 TABLET | Refills: 1 | Status: SHIPPED | OUTPATIENT
Start: 2024-12-27

## 2024-12-27 RX ORDER — OXYCODONE HYDROCHLORIDE 5 MG/1
5 TABLET ORAL EVERY 8 HOURS PRN
Qty: 12 TABLET | Refills: 0 | Status: SHIPPED | OUTPATIENT
Start: 2024-12-27 | End: 2025-01-05

## 2024-12-27 RX ADMIN — KETOROLAC TROMETHAMINE 15 MG: 15 INJECTION, SOLUTION INTRAMUSCULAR; INTRAVENOUS at 03:17

## 2024-12-27 RX ADMIN — KETOROLAC TROMETHAMINE 15 MG: 15 INJECTION, SOLUTION INTRAMUSCULAR; INTRAVENOUS at 08:02

## 2024-12-27 RX ADMIN — METFORMIN HYDROCHLORIDE 1000 MG: 500 TABLET, EXTENDED RELEASE ORAL at 08:05

## 2024-12-27 RX ADMIN — GABAPENTIN 100 MG: 100 CAPSULE ORAL at 08:05

## 2024-12-27 RX ADMIN — ENOXAPARIN SODIUM 40 MG: 100 INJECTION SUBCUTANEOUS at 08:04

## 2024-12-27 RX ADMIN — IBUPROFEN 600 MG: 600 TABLET, FILM COATED ORAL at 05:54

## 2024-12-27 NOTE — PROGRESS NOTES
"  Subjective     Subjective  Patient reports:  She is doing well overnight without complaints. No fever, chills, nausea or vomiting. She is ambulating, voiding, tolerating regular diet, and experiencing flatus. Discussed DC instructions and all questions answered.     Objective     Objective:  Vital signs (most recent): Blood pressure 99/62, pulse 93, temperature 97.5 °F (36.4 °C), temperature source Temporal, resp. rate 18, height 165.1 cm (65\"), weight 96.2 kg (212 lb), last menstrual period 10/10/2024, SpO2 97%, not currently breastfeeding.    Physical Exam:  Incision: clean, dry, and intact   Lungs: normal   Abdomen: abdomen is soft without significant tenderness, masses, organomegaly or guarding.   Extremities:  extremities normal, atraumatic, no cyanosis or edema           Vital Sign Min/Max    Temp  Min: 97 °F (36.1 °C)  Max: 98.3 °F (36.8 °C)   Pulse  Min: 74  Max: 98   Resp  Min: 12  Max: 18   BP  Min: 98/59  Max: 119/73   Weight  Min: 96.2 kg (212 lb)  Max: 96.2 kg (212 lb)   SpO2  Min: 90 %  Max: 99 %   Flow (L/min) (Oxygen Therapy)  Min: 2  Max: 4     Flowsheet Rows      Flowsheet Row First Filed Value   Admission Height 165.1 cm (65\") Documented at 12/26/2024 1038   Admission Weight 96.2 kg (212 lb) Documented at 12/26/2024 1038            Intake/Output last 24 hours:    Intake/Output Summary (Last 24 hours) at 12/27/2024 0856  Last data filed at 12/27/2024 0745  Gross per 24 hour   Intake 2175 ml   Output 1750 ml   Net 425 ml       Intake/Output this shift:  I/O this shift:  In: -   Out: 400 [Urine:400]    Labs/Studies reviewed:  Yes   Radiology studies reviewed:   NA  Medications reviewed:   Yes     Assessment & Plan             Menorrhagia with irregular cycle    Menorrhagia      Post op day 1 Procedure(s):  TOTAL LAPAROSCOPIC HYSTERECTOMY BILATERAL SALPINGO OOPHORECTOMY Doing well postoperatively..    Plan: Will DC home today. Discussed precautions and will f/u in clinic in two weeks. Will send Rx " to meds to beds              Amish Leavitt MD  12/27/24  08:56 EST

## 2024-12-27 NOTE — PLAN OF CARE
Problem: Adult Inpatient Plan of Care  Goal: Plan of Care Review  Outcome: Adequate for Care Transition  Goal: Patient-Specific Goal (Individualized)  Outcome: Adequate for Care Transition  Goal: Absence of Hospital-Acquired Illness or Injury  Outcome: Adequate for Care Transition  Intervention: Identify and Manage Fall Risk  Recent Flowsheet Documentation  Taken 12/27/2024 1000 by Geeta Hairston RN  Safety Promotion/Fall Prevention:   safety round/check completed   room organization consistent   nonskid shoes/slippers when out of bed   clutter free environment maintained   assistive device/personal items within reach   activity supervised   fall prevention program maintained   lighting adjusted   toileting scheduled  Taken 12/27/2024 0802 by Geeta Hairston RN  Safety Promotion/Fall Prevention:   safety round/check completed   room organization consistent   nonskid shoes/slippers when out of bed   clutter free environment maintained   assistive device/personal items within reach   activity supervised   fall prevention program maintained   lighting adjusted   toileting scheduled  Intervention: Prevent Skin Injury  Recent Flowsheet Documentation  Taken 12/27/2024 1000 by Geeta Hairston RN  Body Position: position changed independently  Taken 12/27/2024 0802 by Geeta Hairston RN  Body Position: position changed independently  Skin Protection:   incontinence pads utilized   transparent dressing maintained  Intervention: Prevent and Manage VTE (Venous Thromboembolism) Risk  Recent Flowsheet Documentation  Taken 12/27/2024 0802 by Geeta Hairston RN  VTE Prevention/Management: (see MAR)   bilateral   SCDs (sequential compression devices) on   other (see comments)  Intervention: Prevent Infection  Recent Flowsheet Documentation  Taken 12/27/2024 1000 by Geeta Hairston RN  Infection Prevention:   cohorting utilized   environmental surveillance performed   equipment surfaces disinfected   hand hygiene promoted   rest/sleep promoted    single patient room provided  Taken 12/27/2024 0802 by Geeta Hairston RN  Infection Prevention:   cohorting utilized   environmental surveillance performed   equipment surfaces disinfected   hand hygiene promoted   rest/sleep promoted   single patient room provided  Goal: Optimal Comfort and Wellbeing  Outcome: Adequate for Care Transition  Intervention: Monitor Pain and Promote Comfort  Recent Flowsheet Documentation  Taken 12/27/2024 0802 by Geeta Hairston RN  Pain Management Interventions: pain medication given  Intervention: Provide Person-Centered Care  Recent Flowsheet Documentation  Taken 12/27/2024 1000 by Geeta Hairston RN  Trust Relationship/Rapport:   care explained   choices provided   emotional support provided   empathic listening provided   questions answered   questions encouraged   reassurance provided   thoughts/feelings acknowledged  Taken 12/27/2024 0802 by Geeta Hairston RN  Trust Relationship/Rapport:   care explained   emotional support provided   choices provided   empathic listening provided   questions encouraged   questions answered   reassurance provided   thoughts/feelings acknowledged  Goal: Readiness for Transition of Care  Outcome: Adequate for Care Transition     Problem: Comorbidity Management  Goal: Blood Pressure in Desired Range  Outcome: Adequate for Care Transition  Intervention: Maintain Blood Pressure Management  Recent Flowsheet Documentation  Taken 12/27/2024 1000 by Geeta Hairston RN  Medication Review/Management: medications reviewed  Taken 12/27/2024 0802 by Geeta Hairston RN  Medication Review/Management: medications reviewed   Goal Outcome Evaluation:

## 2024-12-27 NOTE — CASE MANAGEMENT/SOCIAL WORK
Case Management Discharge Note      Final Note: Home         Selected Continued Care - Admitted Since 12/26/2024       Destination    No services have been selected for the patient.                Durable Medical Equipment    No services have been selected for the patient.                Dialysis/Infusion    No services have been selected for the patient.                Home Medical Care    No services have been selected for the patient.                Therapy    No services have been selected for the patient.                Community Resources    No services have been selected for the patient.                Community & DME    No services have been selected for the patient.                         Final Discharge Disposition Code: 01 - home or self-care

## 2024-12-27 NOTE — NURSING NOTE
Patient approved for discharge per MD order. IV removed intact without issue. Patient remains on RA with VSS, voiding appropriately, bowel sounds active x4, tolerating diet and ambulating appropriately. RN completed discharge instructions including medications prescribed, follow up appointments, activity restrictions, and infection prevention. Patient educated on constipation prevention measures including increased fluid intake and ambulation. Patient and spouse verbalized understanding and denies any further questions at this time.

## 2024-12-27 NOTE — DISCHARGE SUMMARY
Discharge Summary    Date of Admission: 12/26/2024  Date of Discharge:  12/27/2024      Patient: Emily Heller      MR#:[id number]    Primary Surgeon/OB: Amish Leavitt MD    Discharge Surgeon/OB: Amish Leavitt MD    Presenting Problem/History of Present Illness  Menorrhagia with irregular cycle [N92.1]  Menorrhagia [N92.0]     Patient Active Problem List    Diagnosis     *Menorrhagia with irregular cycle [N92.1]     Menorrhagia [N92.0]     Intramural leiomyoma of uterus [D25.1]     Dizziness [R42]     Allergic rhinitis [J30.9]     Elevated WBC count [D72.829]     Left arm pain [M79.602]     Class 3 severe obesity due to excess calories with serious comorbidity and body mass index (BMI) of 40.0 to 44.9 in adult [E66.813, E66.01, Z68.41]     Need for hepatitis C screening test [Z11.59]     Chest pain [R07.9]     Encounter for long-term (current) use of other medications [Z79.899]     Bipolar 2 disorder [F31.81]     Thickened endometrium [R93.89]     Benign essential HTN [I10]     Mixed hyperlipidemia [E78.2]     Type 2 diabetes mellitus [E11.9]     History of cold sores [Z86.19]     Screening breast examination [Z12.39]     Screening for colon cancer [Z12.11]     Vitamin deficiency [E56.9]     Morbid obesity [E66.01]     Leukocytosis [D72.829]        Preop Diagnosis:  Menorrhagia    Discharge Diagnosis:   Menorrhagia  Procedures:  Total laparoscopic hysterectomy with bilateral salpingoophorectomy      Discharge Date: 12/27/2024; Discharge Time: 09:10 EST        Hospital Course  Patient is a 54 y.o. female  status post TLH/BSO with uneventful postoperative recovery.  Patient was advanced to regular diet on postoperative day#1.  On discharge, ambulating, tolerating a regular diet without any difficulties and her incision is dry, clean and intact.     Condition on Discharge:  Stable    Vital Signs  Temp:  [97 °F (36.1 °C)-98.3 °F (36.8 °C)] 97.5 °F (36.4 °C)  Heart Rate:  [74-98] 93  Resp:   [12-18] 18  BP: ()/(49-75) 99/62    Lab Results   Component Value Date    WBC 14.91 (H) 12/27/2024    HGB 9.9 (L) 12/27/2024    HCT 31.7 (L) 12/27/2024    MCV 90.8 12/27/2024     12/27/2024       Discharge Disposition  Home or Self Care    Discharge Medications     Discharge Medications        New Medications        Instructions Start Date   ibuprofen 600 MG tablet  Commonly known as: ADVIL,MOTRIN   600 mg, Oral, Every 6 Hours PRN      oxyCODONE 5 MG immediate release tablet  Commonly known as: ROXICODONE   5 mg, Oral, Every 8 Hours PRN             Changes to Medications        Instructions Start Date   Ozempic (2 MG/DOSE) 8 MG/3ML solution pen-injector  Generic drug: Semaglutide (2 MG/DOSE)   2 mg, Subcutaneous, Weekly             Continue These Medications        Instructions Start Date   accu-chek multiclix lancets   Check glucose tid and prn; Accu-Chek Guide Me lancets; E11.9      Alcohol Swabs pads   For use to check blood sugars and prior to giving insulin      atorvastatin 10 MG tablet  Commonly known as: LIPITOR   10 mg, Oral, Daily      BIOTIN PO   1 tablet, Daily      Dexcom G7 Sensor misc   1 Device, Not Applicable, Every 10 Days      fluticasone 50 MCG/ACT nasal spray  Commonly known as: FLONASE   2 sprays, Nasal, Daily      glucose blood test strip   Use as directed daily; Accu-Chek Guide Me Strips; E11.9      insulin degludec 100 UNIT/ML solution pen-injector injection  Commonly known as: TRESIBA FLEXTOUCH   Inject 34 units once daily, titrate for max daily dose of 50 units      Insulin Pen Needle 32G X 4 MM misc   1 each, Not Applicable, Daily      lamoTRIgine 200 MG tablet  Commonly known as: LaMICtal   200 mg, Oral, Daily      levocetirizine 5 MG tablet  Commonly known as: XYZAL   5 mg, Oral, Every Evening      linaclotide 290 MCG capsule capsule  Commonly known as: Linzess   290 mcg, Oral, Every Morning Before Breakfast, Take 30 minutes before first daily meal.       lisinopril-hydrochlorothiazide 10-12.5 MG per tablet  Commonly known as: PRINZIDE,ZESTORETIC   1 tablet, Every Evening      LUTEIN PO   1 tablet, Daily      metFORMIN  MG 24 hr tablet  Commonly known as: GLUCOPHAGE-XR   1,000 mg, Oral, 2 Times Daily With Meals      spironolactone 25 MG tablet  Commonly known as: ALDACTONE   25 mg, Oral, Daily      valACYclovir 1000 MG tablet  Commonly known as: VALTREX   TAKE TWO TABLETS BY MOUTH EVERY 12 HOURS FOR ONE DAY AS NEEDED FOR COLD SORE. START AS SOON AS POSSIBLE AFTER ONSET OF SYMPTOMS      VITAMIN C (CALCIUM ASCORBATE) PO   1 tablet, Daily      VITAMIN D (CHOLECALCIFEROL) PO   Take  by mouth.               Discharge Medications  [unfilled]    Follow-up Appointments  Future Appointments   Date Time Provider Department [place]   1/10/2025  9:20 AM Amish Leavitt MD [facility]   1/29/2025  8:30 AM Rosenstein, Kyle S, MD [facility]         Amish Leavitt MD  12/27/24  09:09 EST  [facility]

## 2024-12-27 NOTE — PLAN OF CARE
Goal Outcome Evaluation:  Plan of Care Reviewed With: patient        Progress: improving  Outcome Evaluation: VSS on RA, CPAP when sleeping. No PRNs necessary, pt denies significant pain, n/v, or soa. Abd lap incs c/d/i. Chapman patent, draining adequate clear yellow UOP, will remove this am. Tolerating regular diet, good PO intake of fluids, IV saline locked. Pt resting well in between care.

## 2025-01-10 ENCOUNTER — OFFICE VISIT (OUTPATIENT)
Dept: OBSTETRICS AND GYNECOLOGY | Facility: CLINIC | Age: 55
End: 2025-01-10
Payer: COMMERCIAL

## 2025-01-10 VITALS
BODY MASS INDEX: 34.49 KG/M2 | SYSTOLIC BLOOD PRESSURE: 124 MMHG | HEIGHT: 65 IN | WEIGHT: 207 LBS | DIASTOLIC BLOOD PRESSURE: 78 MMHG

## 2025-01-10 DIAGNOSIS — Z09 POSTOPERATIVE FOLLOW-UP: Primary | ICD-10-CM

## 2025-01-10 PROCEDURE — 99024 POSTOP FOLLOW-UP VISIT: CPT | Performed by: OBSTETRICS & GYNECOLOGY

## 2025-01-10 NOTE — PROGRESS NOTES
OBGYN Postoperative Exam Note          Subjective   Chief Complaint   Patient presents with    Post-op     Emily Heller is a 54 y.o. year old  presenting to be seen for her post-operative visit. She is S/P TLH with bilateral salpingo-oophorectomy  on 2024 at [facility]. Currently she reports no problems with eating, bowel movements, voiding, or wound drainage and pain is well controlled.    The pathology results from her procedure are in Emily's record and are benign.      OTHER THINGS SHE WANTS TO DISCUSS TODAY:  Nothing else      Current Outpatient Medications:     Alcohol Swabs pads, For use to check blood sugars and prior to giving insulin, Disp: 100 each, Rfl: 2    atorvastatin (LIPITOR) 10 MG tablet, Take 1 tablet by mouth Daily., Disp: 90 tablet, Rfl: 1    BIOTIN PO, Take 1 tablet by mouth Daily., Disp: , Rfl:     Continuous Glucose Sensor (Dexcom G7 Sensor) misc, Use 1 Device Every 10 (Ten) Days., Disp: 3 each, Rfl: 11    fluticasone (FLONASE) 50 MCG/ACT nasal spray, Administer 2 sprays into the nostril(s) as directed by provider Daily., Disp: 16 g, Rfl: 2    glucose blood test strip, Use as directed daily; Accu-Chek Guide Me Strips; E11.9, Disp: 100 each, Rfl: 3    ibuprofen (ADVIL,MOTRIN) 600 MG tablet, Take 1 tablet by mouth Every 6 (Six) Hours As Needed for Mild Pain., Disp: 30 tablet, Rfl: 1    insulin degludec (TRESIBA FLEXTOUCH) 100 UNIT/ML solution pen-injector injection, Inject 34 units once daily, titrate for max daily dose of 50 units, Disp: 15 mL, Rfl: 5    Insulin Pen Needle 32G X 4 MM misc, Use 1 each Daily., Disp: 100 each, Rfl: 3    lamoTRIgine (LaMICtal) 200 MG tablet, TAKE 1 TABLET BY MOUTH DAILY, Disp: 30 tablet, Rfl: 0    Lancets (accu-chek multiclix) lancets, Check glucose tid and prn; Accu-Chek Guide Me lancets; E11.9, Disp: 100 each, Rfl: 5    levocetirizine (XYZAL) 5 MG tablet, Take 1 tablet by mouth Every Evening., Disp: 30 tablet, Rfl:  2    linaclotide (Linzess) 290 MCG capsule capsule, Take 1 capsule by mouth Every Morning Before Breakfast. Take 30 minutes before first daily meal., Disp: 30 capsule, Rfl: 12    lisinopril-hydrochlorothiazide (PRINZIDE,ZESTORETIC) 10-12.5 MG per tablet, Take 1 tablet by mouth Every Evening., Disp: , Rfl:     LUTEIN PO, Take 1 tablet by mouth Daily., Disp: , Rfl:     metFORMIN ER (GLUCOPHAGE-XR) 500 MG 24 hr tablet, Take 2 tablets by mouth 2 (Two) Times a Day With Meals., Disp: 360 tablet, Rfl: 1    Semaglutide, 2 MG/DOSE, (Ozempic, 2 MG/DOSE,) 8 MG/3ML solution pen-injector, Inject 2 mg under the skin into the appropriate area as directed 1 (One) Time Per Week., Disp: 9 mL, Rfl: 1    spironolactone (ALDACTONE) 25 MG tablet, TAKE 1 TABLET BY MOUTH DAILY, Disp: 90 tablet, Rfl: 0    valACYclovir (VALTREX) 1000 MG tablet, TAKE TWO TABLETS BY MOUTH EVERY 12 HOURS FOR ONE DAY AS NEEDED FOR COLD SORE. START AS SOON AS POSSIBLE AFTER ONSET OF SYMPTOMS, Disp: 20 tablet, Rfl: 2    VITAMIN C, CALCIUM ASCORBATE, PO, Take 1 tablet by mouth Daily., Disp: , Rfl:     VITAMIN D, CHOLECALCIFEROL, PO, Take  by mouth., Disp: , Rfl:      Past Medical History:   Diagnosis Date    Abnormal Pap smear of cervix 1996    ADD (attention deficit disorder)     Anxiety     Arthritis     Asthma     Bipolar disorder     Cancer Nasal cell carcinoma    Carpal tunnel syndrome     Depression     Diabetes     Encounter for long-term (current) use of other medications 09/23/2022    Esophageal cyst     duplication cyst    Fibroid 9/2022    Gestational hypertension 4/1/2001    H/O cold sores     Hyperlipidemia     Hypertension     Leukocytosis     Migraine 1995    Obesity     PCOS (polycystic ovarian syndrome)     Sleep apnea 2004    wears mask    Urinary tract infection         Past Surgical History:   Procedure Laterality Date    CERVICAL BIOPSY  W/ LOOP ELECTRODE EXCISION  1996    CHOLECYSTECTOMY      COLONOSCOPY  12/2023    DILATATION AND CURETTAGE   "10/6/22    GANGLION CYST EXCISION      LEEP      REDUCTION MAMMAPLASTY  2003    TOTAL LAPAROSCOPIC HYSTERECTOMY SALPINGO OOPHORECTOMY N/A 12/26/2024    Procedure: TOTAL LAPAROSCOPIC HYSTERECTOMY BILATERAL SALPINGO OOPHORECTOMY;  Surgeon: Amish Leavitt MD;  Location: [facility];  Service: Obstetrics/Gynecology;  Laterality: N/A;    WISDOM TOOTH EXTRACTION  1986       The following portions of the patient's history were reviewed and updated as appropriate:current medications and allergies    Review of Systems   Constitutional: Negative.    Respiratory: Negative.     Cardiovascular: Negative.    Gastrointestinal: Negative.    Genitourinary: Negative.    Musculoskeletal: Negative.    Neurological: Negative.    Psychiatric/Behavioral: Negative.            Objective   /78   Ht 165.1 cm (65\")   Wt 93.9 kg (207 lb)   LMP 11/12/2024 (Exact Date)   BMI 34.45 kg/m²     Physical Exam  Vitals reviewed.   Constitutional:       Appearance: Normal appearance.   HENT:      Head: Normocephalic and atraumatic.   Abdominal:      General: Abdomen is flat.      Palpations: Abdomen is soft.   Skin:     General: Skin is warm and dry.   Neurological:      Mental Status: She is alert and oriented to person, place, and time.   Psychiatric:         Mood and Affect: Mood normal.         Behavior: Behavior normal.              Assessment   S/P TLH/BSO     Plan   Avoid tampons, douching and intercourse  Lifting restriction of no more than 10 pounds.  Nothing per vagina still until 6 weeks after her procedure.  Pathology was reviewed with patient and Any significant events that occurred during surgery reviewed  The importance of keeping all planned follow-up and taking all medications as prescribed was emphasized.  Return in about 4 weeks (around 2/7/2025) for for 6 weeks postoperative visit.              Amish Leavitt MD  01/10/2025     Answers submitted by the patient for this visit:  Primary Reason for Visit (Submitted " on 1/10/2025)  What is the primary reason for your visit?: Problem Not Listed  Problem not listed (Submitted on 1/10/2025)  Chief Complaint: Other medical problem  Reason for appointment: Post-op follow-uo

## 2025-01-19 DIAGNOSIS — F32.9 MAJOR DEPRESSIVE DISORDER WITH CURRENT ACTIVE EPISODE, UNSPECIFIED DEPRESSION EPISODE SEVERITY, UNSPECIFIED WHETHER RECURRENT: ICD-10-CM

## 2025-01-19 DIAGNOSIS — R45.4 ANGER: ICD-10-CM

## 2025-01-20 RX ORDER — LISINOPRIL AND HYDROCHLOROTHIAZIDE 10; 12.5 MG/1; MG/1
1 TABLET ORAL DAILY
Qty: 90 TABLET | Refills: 0 | Status: SHIPPED | OUTPATIENT
Start: 2025-01-20

## 2025-01-20 RX ORDER — LAMOTRIGINE 200 MG/1
200 TABLET ORAL DAILY
Qty: 30 TABLET | Refills: 0 | Status: SHIPPED | OUTPATIENT
Start: 2025-01-20

## 2025-01-23 NOTE — PROGRESS NOTES
Chief complaint/Reason for consult: T2DM    HPI: Ms. Heller is a 54-year-old female with T2DM, hypertension, hyperlipidemia, bipolar disorder and obesity who comes for follow-up of diabetes.  She was last seen 11/27/2024 and reports since that time undergoing hysterectomy 12/26/2024.  She has recovered and feels great. 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 It was unclear where it originated.      # [id number], controlled with complications  # Elevated urine microalbumin, resolved  # Diabetic polyneuropathy   - Diagnosed: ~2014   - Current regimen includes: Tresiba 34 units daily, metformin XR 1000 mg twice daily and Ozempic 2.0 mg weekly  - Previously with very bothersome yeast infections, none recently   - Ozempic is tolerated   - Compliance with medications is good   - HbA1c: 6.5% done 11/20/2024, 11.6% done 2/13/2024  - Not using Dexcom CGM due to cost  - Check FSBG rarely   - Hypoglycemia occurs rarely with fasting, nothing documented below 70 mg/dL  - Unsure if hyperglycemia is occurring, possibly a little more with poor holiday diet   - Patient reports neuropathy remains stable and not too bothersome   - Patient denies gastroparesis   - Patient reports rotating injection sites   - Patient with known ASCVD   - taking lisinopril 10 mg daily; blood pressure today 124/76     DM Health Maintenance:  Ophtho: done 2/6/2024, no retinopathy  Monofilament / Foot exam: Done 3/20/2024  Lipids/Statin: taking a statin with last FLP showing LDL 61 done 11/20/2024 no adverse effects from atorvastatin at this time and reports good compliance  SIENNA:Cr negative done 11/27/2024  TSH: 0.983 done 11/20/2024  Aspirin: not taking    Past medical history, past surgical history, family history and social history reviewed within this encounter.  "    Review of Systems   Constitutional:  Negative for activity change and unexpected weight change.   HENT:  Positive for hearing loss (following wtih ENT, plans to get hearing aids) and trouble swallowing. Negative for voice change.    Eyes:  Negative for visual disturbance.   Respiratory:  Negative for shortness of breath.    Cardiovascular:  Negative for chest pain.   Gastrointestinal:  Negative for abdominal pain.   Endocrine: Negative for cold intolerance and heat intolerance.   Musculoskeletal:  Negative for gait problem.   Skin:  Negative for rash.   Neurological:  Positive for numbness.   Psychiatric/Behavioral:  Negative for agitation and confusion.         /76 (BP Location: Left arm, Patient Position: Sitting, Cuff Size: Adult)   Pulse 78   Ht 165.1 cm (65\")   Wt 95 kg (209 lb 8 oz)   LMP 11/12/2024 (Exact Date)   SpO2 98%   BMI 34.86 kg/m²      Physical Exam  Vitals reviewed.   Constitutional:       General: She is not in acute distress.     Appearance: She is obese.   HENT:      Head: Normocephalic.      Nose: Nose normal.   Eyes:      Conjunctiva/sclera: Conjunctivae normal.   Cardiovascular:      Rate and Rhythm: Normal rate.      Pulses: Normal pulses.           Dorsalis pedis pulses are 2+ on the right side and 2+ on the left side.   Pulmonary:      Effort: Pulmonary effort is normal.   Abdominal:      Tenderness: There is no guarding.   Musculoskeletal:         General: No deformity.      Right foot: No deformity or bunion.      Left foot: No deformity or bunion.   Feet:      Right foot:      Protective Sensation: 8 sites tested.  8 sites sensed.      Skin integrity: Skin integrity normal.      Toenail Condition: Right toenails are normal.      Left foot:      Protective Sensation: 8 sites tested.  8 sites sensed.      Skin integrity: Skin integrity normal.      Toenail Condition: Left toenails are normal.      Comments: Normal sensation to monofilament  Skin:     General: Skin is warm " and dry.   Neurological:      Mental Status: She is alert and oriented to person, place, and time.   Psychiatric:         Mood and Affect: Mood normal.          Labs and images reviewed as noted in the HPI    Assessment and plan:    Diagnoses and all orders for this visit:    1. Type 2 diabetes mellitus with hyperglycemia, with long-term current use of insulin (Primary)  Assessment & Plan:  -Diabetes is currently well-controlled  -Complications include diabetic polyneuropathy  -Continue Tresiba to 34 units daily  -Continue metformin XR 1000 mg twice daily  -Continue Ozempic to 2.0 mg weekly  -Recommend she check FSBG once daily and as needed for signs/symptoms of hypoglycemia  -May consider SGLT2 inhibitor in the future if she continues to be without yeast infection/UTI  -Continue lisinopril 10 mg daily; blood pressure today 124/76     DM Health Maintenance:  Ophtho: done 2/6/2024, no retinopathy  Monofilament / Foot exam: Done today, unremarkable  Lipids/Statin: taking a statin with last FLP showing LDL 61 done 11/20/2024   SIENNA:Cr negative done 11/27/2024  TSH: 0.983 done 11/20/2024  Aspirin: not taking       Orders:  -     insulin degludec (TRESIBA FLEXTOUCH) 100 UNIT/ML solution pen-injector injection; Inject 34 units once daily, titrate for max daily dose of 50 units  Dispense: 15 mL; Refill: 5  -     glucose blood test strip; Use as directed daily; Accu-Chek Guide Me Strips; E11.9  Dispense: 100 each; Refill: 3  -     Insulin Pen Needle 32G X 4 MM misc; Use 1 each Daily.  Dispense: 100 each; Refill: 3  -     metFORMIN ER (GLUCOPHAGE-XR) 500 MG 24 hr tablet; Take 2 tablets by mouth 2 (Two) Times a Day With Meals.  Dispense: 360 tablet; Refill: 1  -     Semaglutide, 2 MG/DOSE, (Ozempic, 2 MG/DOSE,) 8 MG/3ML solution pen-injector; Inject 2 mg under the skin into the appropriate area as directed 1 (One) Time Per Week.  Dispense: 9 mL; Refill: 2    2. Mixed hyperlipidemia  Assessment & Plan:  -Controlled, continue  atorvastatin 10 mg daily    Orders:  -     atorvastatin (LIPITOR) 10 MG tablet; Take 1 tablet by mouth Daily.  Dispense: 90 tablet; Refill: 3         Return in about 5 months (around 6/29/2025) for T2DM.     Please note that portions of this note may have been completed with a voice recognition program. Efforts were made to edit the dictations, but occasionally words are mistranscribed.     Electronically signed by: Kyle S Rosenstein, MD

## 2025-01-29 ENCOUNTER — OFFICE VISIT (OUTPATIENT)
Dept: ENDOCRINOLOGY | Facility: CLINIC | Age: 55
End: 2025-01-29
Payer: COMMERCIAL

## 2025-01-29 VITALS
HEIGHT: 65 IN | WEIGHT: 209.5 LBS | OXYGEN SATURATION: 98 % | BODY MASS INDEX: 34.91 KG/M2 | DIASTOLIC BLOOD PRESSURE: 76 MMHG | SYSTOLIC BLOOD PRESSURE: 124 MMHG | HEART RATE: 78 BPM

## 2025-01-29 DIAGNOSIS — E78.2 MIXED HYPERLIPIDEMIA: ICD-10-CM

## 2025-01-29 DIAGNOSIS — Z79.4 TYPE 2 DIABETES MELLITUS WITH HYPERGLYCEMIA, WITH LONG-TERM CURRENT USE OF INSULIN: Primary | ICD-10-CM

## 2025-01-29 DIAGNOSIS — E11.65 TYPE 2 DIABETES MELLITUS WITH HYPERGLYCEMIA, WITH LONG-TERM CURRENT USE OF INSULIN: Primary | ICD-10-CM

## 2025-01-29 RX ORDER — ATORVASTATIN CALCIUM 10 MG/1
10 TABLET, FILM COATED ORAL DAILY
Qty: 90 TABLET | Refills: 3 | Status: SHIPPED | OUTPATIENT
Start: 2025-01-29

## 2025-01-29 RX ORDER — SEMAGLUTIDE 2.68 MG/ML
2 INJECTION, SOLUTION SUBCUTANEOUS WEEKLY
Qty: 9 ML | Refills: 2 | Status: SHIPPED | OUTPATIENT
Start: 2025-01-29

## 2025-01-29 RX ORDER — METFORMIN HYDROCHLORIDE 500 MG/1
1000 TABLET, EXTENDED RELEASE ORAL 2 TIMES DAILY WITH MEALS
Qty: 360 TABLET | Refills: 1 | Status: SHIPPED | OUTPATIENT
Start: 2025-01-29

## 2025-01-29 NOTE — ASSESSMENT & PLAN NOTE
-Diabetes is currently well-controlled  -Complications include diabetic polyneuropathy  -Continue Tresiba to 34 units daily  -Continue metformin XR 1000 mg twice daily  -Continue Ozempic to 2.0 mg weekly  -Recommend she check FSBG once daily and as needed for signs/symptoms of hypoglycemia  -May consider SGLT2 inhibitor in the future if she continues to be without yeast infection/UTI  -Continue lisinopril 10 mg daily; blood pressure today 124/76     DM Health Maintenance:  Ophtho: done 2/6/2024, no retinopathy  Monofilament / Foot exam: Done today, unremarkable  Lipids/Statin: taking a statin with last FLP showing LDL 61 done 11/20/2024   SIENNA:Cr negative done 11/27/2024  TSH: 0.983 done 11/20/2024  Aspirin: not taking

## 2025-02-12 DIAGNOSIS — I10 BENIGN ESSENTIAL HTN: ICD-10-CM

## 2025-02-12 RX ORDER — SPIRONOLACTONE 25 MG/1
25 TABLET ORAL DAILY
Qty: 90 TABLET | Refills: 0 | Status: SHIPPED | OUTPATIENT
Start: 2025-02-12

## 2025-02-19 ENCOUNTER — OFFICE VISIT (OUTPATIENT)
Dept: OBSTETRICS AND GYNECOLOGY | Facility: CLINIC | Age: 55
End: 2025-02-19
Payer: COMMERCIAL

## 2025-02-19 VITALS — BODY MASS INDEX: 35.28 KG/M2 | DIASTOLIC BLOOD PRESSURE: 80 MMHG | WEIGHT: 212 LBS | SYSTOLIC BLOOD PRESSURE: 118 MMHG

## 2025-02-19 DIAGNOSIS — Z09 POSTOPERATIVE EXAMINATION: Primary | ICD-10-CM

## 2025-02-19 PROCEDURE — 99024 POSTOP FOLLOW-UP VISIT: CPT | Performed by: OBSTETRICS & GYNECOLOGY

## 2025-02-19 NOTE — PROGRESS NOTES
OBGYN Postoperative Exam Note          Subjective   Chief Complaint   Patient presents with    Post-op Follow-up     Emily Heller is a 54 y.o. year old  presenting to be seen for her post-operative visit. S/P TLH with bilateral salpingo-oophorectomy  on 2024 at [facility]. Currently she reports no problems with eating, bowel movements, voiding, or wound drainage and pain is well controlled.    The results have previously been discussed with Emily.    OTHER THINGS SHE WANTS TO DISCUSS TODAY:  Nothing else      Current Outpatient Medications:     Alcohol Swabs pads, For use to check blood sugars and prior to giving insulin, Disp: 100 each, Rfl: 2    atorvastatin (LIPITOR) 10 MG tablet, Take 1 tablet by mouth Daily., Disp: 90 tablet, Rfl: 3    BIOTIN PO, Take 1 tablet by mouth Daily., Disp: , Rfl:     Continuous Glucose Sensor (Dexcom G7 Sensor) misc, Use 1 Device Every 10 (Ten) Days., Disp: 3 each, Rfl: 11    fluticasone (FLONASE) 50 MCG/ACT nasal spray, Administer 2 sprays into the nostril(s) as directed by provider Daily., Disp: 16 g, Rfl: 2    glucose blood test strip, Use as directed daily; Accu-Chek Guide Me Strips; E11.9, Disp: 100 each, Rfl: 3    insulin degludec (TRESIBA FLEXTOUCH) 100 UNIT/ML solution pen-injector injection, Inject 34 units once daily, titrate for max daily dose of 50 units, Disp: 15 mL, Rfl: 5    Insulin Pen Needle 32G X 4 MM misc, Use 1 each Daily., Disp: 100 each, Rfl: 3    lamoTRIgine (LaMICtal) 200 MG tablet, TAKE 1 TABLET BY MOUTH DAILY, Disp: 30 tablet, Rfl: 0    Lancets (accu-chek multiclix) lancets, Check glucose tid and prn; Accu-Chek Guide Me lancets; E11.9, Disp: 100 each, Rfl: 5    levocetirizine (XYZAL) 5 MG tablet, Take 1 tablet by mouth Every Evening., Disp: 30 tablet, Rfl: 2    linaclotide (Linzess) 290 MCG capsule capsule, Take 1 capsule by mouth Every Morning Before Breakfast. Take 30 minutes before first daily meal., Disp: 30  capsule, Rfl: 12    lisinopril-hydrochlorothiazide (PRINZIDE,ZESTORETIC) 10-12.5 MG per tablet, TAKE 1 TABLET BY MOUTH DAILY, Disp: 90 tablet, Rfl: 0    LUTEIN PO, Take 1 tablet by mouth Daily., Disp: , Rfl:     metFORMIN ER (GLUCOPHAGE-XR) 500 MG 24 hr tablet, Take 2 tablets by mouth 2 (Two) Times a Day With Meals., Disp: 360 tablet, Rfl: 1    Semaglutide, 2 MG/DOSE, (Ozempic, 2 MG/DOSE,) 8 MG/3ML solution pen-injector, Inject 2 mg under the skin into the appropriate area as directed 1 (One) Time Per Week., Disp: 9 mL, Rfl: 2    spironolactone (ALDACTONE) 25 MG tablet, TAKE 1 TABLET BY MOUTH DAILY, Disp: 90 tablet, Rfl: 0    valACYclovir (VALTREX) 1000 MG tablet, TAKE TWO TABLETS BY MOUTH EVERY 12 HOURS FOR ONE DAY AS NEEDED FOR COLD SORE. START AS SOON AS POSSIBLE AFTER ONSET OF SYMPTOMS, Disp: 20 tablet, Rfl: 2    VITAMIN C, CALCIUM ASCORBATE, PO, Take 1 tablet by mouth Daily., Disp: , Rfl:     VITAMIN D, CHOLECALCIFEROL, PO, Take  by mouth., Disp: , Rfl:      Past Medical History:   Diagnosis Date    Abnormal Pap smear of cervix 1996    ADD (attention deficit disorder)     Anxiety     Arthritis     Asthma     Bipolar disorder     Cancer Nasal cell carcinoma    Carpal tunnel syndrome     Depression     Diabetes     Encounter for long-term (current) use of other medications 09/23/2022    Esophageal cyst     duplication cyst    Fibroid 9/2022    Gestational hypertension 4/1/2001    H/O cold sores     Hyperlipidemia     Hypertension     Leukocytosis     Migraine 1995    Obesity     PCOS (polycystic ovarian syndrome)     Sleep apnea 2004    wears mask    Urinary tract infection         Past Surgical History:   Procedure Laterality Date    CERVICAL BIOPSY  W/ LOOP ELECTRODE EXCISION  1996    CHOLECYSTECTOMY      COLONOSCOPY  12/2023    DILATATION AND CURETTAGE  10/6/22    GANGLION CYST EXCISION      LEEP      REDUCTION MAMMAPLASTY  2003    TOTAL LAPAROSCOPIC HYSTERECTOMY SALPINGO OOPHORECTOMY N/A 12/26/2024     Procedure: TOTAL LAPAROSCOPIC HYSTERECTOMY BILATERAL SALPINGO OOPHORECTOMY;  Surgeon: Amish Leavitt MD;  Location: [facility];  Service: Obstetrics/Gynecology;  Laterality: N/A;    WISDOM TOOTH EXTRACTION  1986       The following portions of the patient's history were reviewed and updated as appropriate:current medications and allergies    Review of Systems   Constitutional: Negative.    Respiratory: Negative.     Cardiovascular: Negative.    Gastrointestinal: Negative.    Genitourinary: Negative.    Musculoskeletal: Negative.    Neurological: Negative.    Psychiatric/Behavioral: Negative.            Objective   /80   Wt 96.2 kg (212 lb)   LMP 11/12/2024 (Exact Date)   BMI 35.28 kg/m²     Physical Exam  Vitals reviewed. Exam conducted with a chaperone present.   HENT:      Head: Normocephalic and atraumatic.   Abdominal:      General: Abdomen is flat.      Palpations: Abdomen is soft.   Genitourinary:     General: Normal vulva.      Vagina: Normal.      Comments: Vaginal cuff well healed  Skin:     General: Skin is warm and dry.   Neurological:      Mental Status: She is alert and oriented to person, place, and time.   Psychiatric:         Mood and Affect: Mood normal.         Behavior: Behavior normal.              Assessment   S/P TLH/BSO     Plan   May return to full activity with no restrictions  Pathology was reviewed with patient and Any significant events that occurred during surgery reviewed  The importance of keeping all planned follow-up and taking all medications as prescribed was emphasized.  Return in about 1 year (around 2/19/2026) for Annual physical.              Amish Leavitt MD  02/19/2025     Answers submitted by the patient for this visit:  Post Operative Visit (Submitted on 2/19/2025)  Chief Complaint: Follow-up  Pain Control: not requiring pain medication  Fever: no fever  Diet: adequate intake  Activity: normal  Operative Site Issues: No

## 2025-02-23 DIAGNOSIS — F32.9 MAJOR DEPRESSIVE DISORDER WITH CURRENT ACTIVE EPISODE, UNSPECIFIED DEPRESSION EPISODE SEVERITY, UNSPECIFIED WHETHER RECURRENT: ICD-10-CM

## 2025-02-23 DIAGNOSIS — R45.4 ANGER: ICD-10-CM

## 2025-02-24 RX ORDER — LAMOTRIGINE 200 MG/1
200 TABLET ORAL DAILY
Qty: 30 TABLET | Refills: 0 | Status: SHIPPED | OUTPATIENT
Start: 2025-02-24

## 2025-03-13 DIAGNOSIS — J30.2 SEASONAL ALLERGIC RHINITIS, UNSPECIFIED TRIGGER: ICD-10-CM

## 2025-03-13 RX ORDER — LEVOCETIRIZINE DIHYDROCHLORIDE 5 MG/1
5 TABLET, FILM COATED ORAL EVERY EVENING
Qty: 30 TABLET | Refills: 2 | Status: SHIPPED | OUTPATIENT
Start: 2025-03-13

## 2025-03-22 DIAGNOSIS — R45.4 ANGER: ICD-10-CM

## 2025-03-22 DIAGNOSIS — F32.9 MAJOR DEPRESSIVE DISORDER WITH CURRENT ACTIVE EPISODE, UNSPECIFIED DEPRESSION EPISODE SEVERITY, UNSPECIFIED WHETHER RECURRENT: ICD-10-CM

## 2025-03-24 RX ORDER — LAMOTRIGINE 200 MG/1
200 TABLET ORAL DAILY
Qty: 60 TABLET | Refills: 0 | Status: SHIPPED | OUTPATIENT
Start: 2025-03-24

## 2025-05-16 DIAGNOSIS — I10 BENIGN ESSENTIAL HTN: ICD-10-CM

## 2025-05-16 RX ORDER — SPIRONOLACTONE 25 MG/1
25 TABLET ORAL DAILY
Qty: 90 TABLET | Refills: 0 | Status: SHIPPED | OUTPATIENT
Start: 2025-05-16

## 2025-05-22 DIAGNOSIS — R45.4 ANGER: ICD-10-CM

## 2025-05-22 DIAGNOSIS — F32.9 MAJOR DEPRESSIVE DISORDER WITH CURRENT ACTIVE EPISODE, UNSPECIFIED DEPRESSION EPISODE SEVERITY, UNSPECIFIED WHETHER RECURRENT: ICD-10-CM

## 2025-05-22 RX ORDER — LAMOTRIGINE 200 MG/1
200 TABLET ORAL DAILY
Qty: 60 TABLET | Refills: 0 | OUTPATIENT
Start: 2025-05-22

## 2025-05-30 ENCOUNTER — OFFICE VISIT (OUTPATIENT)
Dept: FAMILY MEDICINE CLINIC | Facility: CLINIC | Age: 55
End: 2025-05-30
Payer: COMMERCIAL

## 2025-05-30 VITALS
SYSTOLIC BLOOD PRESSURE: 130 MMHG | HEART RATE: 89 BPM | OXYGEN SATURATION: 98 % | DIASTOLIC BLOOD PRESSURE: 82 MMHG | BODY MASS INDEX: 35.37 KG/M2 | WEIGHT: 212.31 LBS | HEIGHT: 65 IN

## 2025-05-30 DIAGNOSIS — E78.2 MIXED HYPERLIPIDEMIA: ICD-10-CM

## 2025-05-30 DIAGNOSIS — M25.551 PAIN OF BOTH HIP JOINTS: ICD-10-CM

## 2025-05-30 DIAGNOSIS — Z79.899 ENCOUNTER FOR LONG-TERM (CURRENT) USE OF MEDICATIONS: ICD-10-CM

## 2025-05-30 DIAGNOSIS — R80.9 TYPE 2 DIABETES MELLITUS WITH DIABETIC MICROALBUMINURIA, WITH LONG-TERM CURRENT USE OF INSULIN: ICD-10-CM

## 2025-05-30 DIAGNOSIS — E11.29 TYPE 2 DIABETES MELLITUS WITH DIABETIC MICROALBUMINURIA, WITH LONG-TERM CURRENT USE OF INSULIN: ICD-10-CM

## 2025-05-30 DIAGNOSIS — I10 BENIGN ESSENTIAL HTN: Primary | ICD-10-CM

## 2025-05-30 DIAGNOSIS — E56.9 VITAMIN DEFICIENCY: ICD-10-CM

## 2025-05-30 DIAGNOSIS — J30.2 SEASONAL ALLERGIC RHINITIS, UNSPECIFIED TRIGGER: ICD-10-CM

## 2025-05-30 DIAGNOSIS — F32.9 MAJOR DEPRESSIVE DISORDER WITH CURRENT ACTIVE EPISODE, UNSPECIFIED DEPRESSION EPISODE SEVERITY, UNSPECIFIED WHETHER RECURRENT: ICD-10-CM

## 2025-05-30 DIAGNOSIS — Z79.4 TYPE 2 DIABETES MELLITUS WITH DIABETIC MICROALBUMINURIA, WITH LONG-TERM CURRENT USE OF INSULIN: ICD-10-CM

## 2025-05-30 DIAGNOSIS — M25.552 PAIN OF BOTH HIP JOINTS: ICD-10-CM

## 2025-05-30 DIAGNOSIS — R45.4 ANGER: ICD-10-CM

## 2025-05-30 PROBLEM — M25.50 ARTHRALGIA: Status: ACTIVE | Noted: 2025-05-30

## 2025-05-30 LAB
BILIRUB BLD-MCNC: NEGATIVE MG/DL
CLARITY, POC: CLEAR
COLOR UR: YELLOW
EXPIRATION DATE: NORMAL
GLUCOSE UR STRIP-MCNC: NEGATIVE MG/DL
KETONES UR QL: NEGATIVE
LEUKOCYTE EST, POC: NEGATIVE
Lab: NORMAL
NITRITE UR-MCNC: NEGATIVE MG/ML
PH UR: 5.5 [PH] (ref 5–8)
PROT UR STRIP-MCNC: NEGATIVE MG/DL
RBC # UR STRIP: NEGATIVE /UL
SP GR UR: 1.01 (ref 1–1.03)
UROBILINOGEN UR QL: NORMAL

## 2025-05-30 PROCEDURE — 99214 OFFICE O/P EST MOD 30 MIN: CPT | Performed by: NURSE PRACTITIONER

## 2025-05-30 PROCEDURE — 81003 URINALYSIS AUTO W/O SCOPE: CPT | Performed by: NURSE PRACTITIONER

## 2025-05-30 RX ORDER — LISINOPRIL AND HYDROCHLOROTHIAZIDE 10; 12.5 MG/1; MG/1
1 TABLET ORAL DAILY
Qty: 90 TABLET | Refills: 1 | Status: SHIPPED | OUTPATIENT
Start: 2025-05-30

## 2025-05-30 RX ORDER — SPIRONOLACTONE 25 MG/1
25 TABLET ORAL DAILY
Qty: 90 TABLET | Refills: 1 | Status: SHIPPED | OUTPATIENT
Start: 2025-05-30

## 2025-05-30 RX ORDER — LAMOTRIGINE 200 MG/1
200 TABLET ORAL DAILY
Qty: 90 TABLET | Refills: 1 | Status: SHIPPED | OUTPATIENT
Start: 2025-05-30

## 2025-05-30 RX ORDER — MAGNESIUM OXIDE/MAG AA CHELATE 300 MG
CAPSULE ORAL
COMMUNITY

## 2025-05-30 RX ORDER — LEVOCETIRIZINE DIHYDROCHLORIDE 5 MG/1
5 TABLET, FILM COATED ORAL EVERY EVENING
Qty: 90 TABLET | Refills: 1 | Status: SHIPPED | OUTPATIENT
Start: 2025-05-30

## 2025-05-30 NOTE — PROGRESS NOTES
Chief Complaint  Hyperlipidemia, Hypertension, and Diabetes    Subjective          Emily Heller presents to [facility]  Hyperlipidemia  Pertinent negatives include no shortness of breath.   Hypertension  Associated symptoms: no shortness of breath and no dizziness    Diabetes  Associated symptoms:     no fatigue and no weakness    Hypoglycemia symptoms:     no dizziness      History of Present Illness  The patient is a 55-year-old female here for a follow-up of hypertension and allergies. She states she is currently doing well on spironolactone, lisinopril/hydrochlorothiazide, levocetirizine, Flonase, and Lamictal. She also has a history of a left upper paravertebral cystic focus and sees Dr. Amezquita at [facility] regarding this.    She reports overall good health but expresses concern about potential inflammation in her joints, particularly her hips. Despite having a sedentary job, she experiences frequent movement throughout the day. She describes a sensation of feeling significantly older than her actual age, particularly after periods of sitting. She experiences joint aches and pains, with severe stiffness in her hips. She is uncertain about the possibility of autoimmune disorders in her family history, although she mentions that her sister was diagnosed with lupus. She also reports a family history of arthritis. She expresses reluctance to start daily arthritis medication due to concerns about potential side effects on her kidneys.     She continues to follow up with her endocrinologist, Dr. Rosenstein, her gynecologist, Dr. Burkett. She had a telehealth visit with Dr. Amezquita regarding her left upper paravertebral cystic focus, which has remained stable since 09/2024. She was previously undergoing scans every 3 months but will now wait a year for the next one. If there is no change, the plan is to extend the interval to 2 years, then 3 years, and eventually  "discontinue monitoring if no changes occur.    PAST SURGICAL HISTORY:  Full hysterectomy in 12/2024  Colonoscopy completed on 12/02/2022    FAMILY HISTORY  Her mother had lung cancer and underwent her first chemotherapy treatment on Wednesday. Her sister was diagnosed with lupus, according to her. Her aunt had kidney cancer and fatty liver disease.    Patient Care Team:  Rubens Bass APRN as PCP - General (Nurse Practitioner)  Reji Herndon MD as Consulting Physician (Hematology and Oncology)  Amish Leavitt MD as Consulting Physician (Obstetrics and Gynecology)  Rosenstein, Kyle S, MD as Consulting Physician (Endocrinology)     Objective   Vital Signs:   /82   Pulse 89   Ht 165.1 cm (65\")   Wt 96.3 kg (212 lb 5 oz)   SpO2 98%   BMI 35.33 kg/m²     Body mass index is 35.33 kg/m².    Review of Systems   Constitutional:  Negative for chills, fatigue and fever.   HENT:  Negative for congestion.    Eyes:  Negative for visual disturbance.   Respiratory:  Negative for cough, shortness of breath and wheezing.    Cardiovascular: Negative.    Gastrointestinal:  Negative for abdominal pain, diarrhea, nausea and vomiting.   Genitourinary:  Negative for decreased urine volume, dysuria, frequency, hematuria and urgency.   Musculoskeletal:  Positive for arthralgias. Negative for joint swelling.   Skin:  Negative for color change, rash, skin lesions and wound.   Neurological:  Negative for dizziness, weakness and headache.   Psychiatric/Behavioral: Negative.         Past History:  Medical History: has a past medical history of Abnormal Pap smear of cervix (1996), ADD (attention deficit disorder), Anxiety, Arthritis, Asthma, Bipolar disorder, Cancer (Nasal cell carcinoma), Carpal tunnel syndrome, Depression, Diabetes, Encounter for long-term (current) use of other medications (09/23/2022), Esophageal cyst, Fibroid (9/2022), Gestational hypertension (4/1/2001), H/O cold sores, Hyperlipidemia, " Hypertension, Leukocytosis, Migraine (1995), Obesity, PCOS (polycystic ovarian syndrome), Sleep apnea (2004), and Urinary tract infection.   Surgical History: has a past surgical history that includes Reduction mammaplasty (2003); Cholecystectomy; Ganglion cyst excision; LEEP; Dilation and curettage of uterus (10/6/22); Cervical biopsy w/ loop electrode excision (1996); [place] tooth extraction (1986); Colonoscopy (12/2023); and total laparoscopic hysterectomy salpingo oophorectomy (N/A, 12/26/2024).   Family History: family history includes Breast cancer in her paternal aunt; Breast cancer (age of onset: 50) in her paternal aunt; Cancer in her mother; Colon cancer in her maternal uncle; Depression in her mother; Heart attack in her maternal grandmother; Heart failure in her father and maternal grandmother; Hyperlipidemia in her father and mother; Hypertension in her father and mother; Liver disease in her maternal aunt; Lung cancer in her mother.   Social History: reports that she quit smoking about 22 years ago. Her smoking use included cigarettes. She started smoking about 38 years ago. She has a 15.7 pack-year smoking history. She has been exposed to tobacco smoke. She has never used smokeless tobacco. She reports current alcohol use. She reports that she does not use drugs.    PHQ-2 Depression Screening  Little interest or pleasure in doing things? Not at all   Feeling down, depressed, or hopeless? Not at all   PHQ-2 Total Score 0       PHQ-9 Depression Screening  Little interest or pleasure in doing things? Not at all   Feeling down, depressed, or hopeless? Not at all   PHQ-2 Total Score 0   Trouble falling or staying asleep, or sleeping too much?     Feeling tired or having little energy?     Poor appetite or overeating?     Feeling bad about yourself - or that you are a failure or have let yourself or your family down?     Trouble concentrating on things, such as reading the newspaper or watching television?      Moving or speaking so slowly that other people could have noticed? Or the opposite - being so fidgety or restless that you have been moving around a lot more than usual?     Thoughts that you would be better off dead, or of hurting yourself in some way?     PHQ-9 Total Score     If you checked off any problems, how difficult have these problems made it for you to do your work, take care of things at home, or get along with other people? Not difficult at all        PHQ-9 Total Score:        Patient screened positive for depression based on a PHQ-9 score of  on . Follow-up recommendations include:          Current Outpatient Medications:     Alcohol Swabs pads, For use to check blood sugars and prior to giving insulin, Disp: 100 each, Rfl: 2    atorvastatin (LIPITOR) 10 MG tablet, Take 1 tablet by mouth Daily., Disp: 90 tablet, Rfl: 3    BIOTIN PO, Take 1 tablet by mouth Daily., Disp: , Rfl:     Coenzyme Q10 (COQ10 PO), Take  by mouth., Disp: , Rfl:     Continuous Glucose Sensor (Dexcom G7 Sensor) misc, Use 1 Device Every 10 (Ten) Days., Disp: 3 each, Rfl: 11    fluticasone (FLONASE) 50 MCG/ACT nasal spray, Administer 2 sprays into the nostril(s) as directed by provider Daily., Disp: 16 g, Rfl: 2    glucose blood test strip, Use as directed daily; Accu-Chek Guide Me Strips; E11.9, Disp: 100 each, Rfl: 3    insulin degludec (TRESIBA FLEXTOUCH) 100 UNIT/ML solution pen-injector injection, Inject 34 units once daily, titrate for max daily dose of 50 units, Disp: 15 mL, Rfl: 5    Insulin Pen Needle 32G X 4 MM misc, Use 1 each Daily., Disp: 100 each, Rfl: 3    lamoTRIgine (LaMICtal) 200 MG tablet, Take 1 tablet by mouth Daily., Disp: 90 tablet, Rfl: 1    Lancets (accu-chek multiclix) lancets, Check glucose tid and prn; Accu-Chek Guide Me lancets; E11.9, Disp: 100 each, Rfl: 5    levocetirizine (XYZAL) 5 MG tablet, Take 1 tablet by mouth Every Evening., Disp: 90 tablet, Rfl: 1    linaclotide (Linzess) 290 MCG capsule  capsule, Take 1 capsule by mouth Every Morning Before Breakfast. Take 30 minutes before first daily meal., Disp: 30 capsule, Rfl: 12    lisinopril-hydrochlorothiazide (PRINZIDE,ZESTORETIC) 10-12.5 MG per tablet, Take 1 tablet by mouth Daily., Disp: 90 tablet, Rfl: 1    LUTEIN PO, Take 1 tablet by mouth Daily., Disp: , Rfl:     Magnesium 300 MG capsule, Take  by mouth. OTC, Disp: , Rfl:     metFORMIN ER (GLUCOPHAGE-XR) 500 MG 24 hr tablet, Take 2 tablets by mouth 2 (Two) Times a Day With Meals., Disp: 360 tablet, Rfl: 1    Semaglutide, 2 MG/DOSE, (Ozempic, 2 MG/DOSE,) 8 MG/3ML solution pen-injector, Inject 2 mg under the skin into the appropriate area as directed 1 (One) Time Per Week., Disp: 9 mL, Rfl: 2    spironolactone (ALDACTONE) 25 MG tablet, Take 1 tablet by mouth Daily., Disp: 90 tablet, Rfl: 1    valACYclovir (VALTREX) 1000 MG tablet, TAKE TWO TABLETS BY MOUTH EVERY 12 HOURS FOR ONE DAY AS NEEDED FOR COLD SORE. START AS SOON AS POSSIBLE AFTER ONSET OF SYMPTOMS, Disp: 20 tablet, Rfl: 2    VITAMIN C, CALCIUM ASCORBATE, PO, Take 1 tablet by mouth Daily., Disp: , Rfl:     VITAMIN D, CHOLECALCIFEROL, PO, Take  by mouth., Disp: , Rfl:    (Not in a hospital admission)     Allergies: Penicillins, Morphine, and Flagyl [metronidazole]    Physical Exam  Constitutional:       Appearance: Normal appearance.   Eyes:      Conjunctiva/sclera: Conjunctivae normal.      Pupils: Pupils are equal, round, and reactive to light.   Cardiovascular:      Rate and Rhythm: Normal rate and regular rhythm.      Heart sounds: Normal heart sounds.   Pulmonary:      Effort: Pulmonary effort is normal.      Breath sounds: Normal breath sounds.   Abdominal:      General: Abdomen is flat. Bowel sounds are normal.      Palpations: Abdomen is soft.   Musculoskeletal:      Right hip: Normal.      Left hip: Normal.   Skin:     General: Skin is warm.      Findings: No erythema or rash.   Neurological:      General: No focal deficit present.       Mental Status: She is alert and oriented to person, place, and time. Mental status is at baseline.   Psychiatric:         Mood and Affect: Mood normal.         Behavior: Behavior normal.         Thought Content: Thought content normal.         Judgment: Judgment normal.        Physical Exam  Respiratory: Clear to auscultation, no wheezing, rales or rhonchi  Cardiovascular: Regular rate and rhythm, no murmurs, rubs, or gallops    Result Review :          Results               Assessment and Plan    Diagnoses and all orders for this visit:    1. Benign essential HTN (Primary)  -     TSH Rfx On Abnormal To Free T4  -     Lipid Panel  -     POC Urinalysis Dipstick, Automated; Future  -     lisinopril-hydrochlorothiazide (PRINZIDE,ZESTORETIC) 10-12.5 MG per tablet; Take 1 tablet by mouth Daily.  Dispense: 90 tablet; Refill: 1  -     spironolactone (ALDACTONE) 25 MG tablet; Take 1 tablet by mouth Daily.  Dispense: 90 tablet; Refill: 1  -     POC Urinalysis Dipstick, Automated    2. Vitamin deficiency  -     Vitamin D,25-Hydroxy  -     Vitamin B12    3. Type 2 diabetes mellitus with diabetic microalbuminuria, with long-term current use of insulin  -     Hemoglobin A1c    4. Encounter for long-term (current) use of medications  -     CBC & Differential  -     Comprehensive Metabolic Panel    5. Pain of both hip joints  -     XR Hips Bilateral With or Without Pelvis 2 View  -     JORGE  -     C-reactive Protein  -     Sedimentation Rate  -     Rheumatoid Factor    6. Mixed hyperlipidemia  -     Lipid Panel    7. Anger  -     lamoTRIgine (LaMICtal) 200 MG tablet; Take 1 tablet by mouth Daily.  Dispense: 90 tablet; Refill: 1    8. Major depressive disorder with current active episode, unspecified depression episode severity, unspecified whether recurrent  -     lamoTRIgine (LaMICtal) 200 MG tablet; Take 1 tablet by mouth Daily.  Dispense: 90 tablet; Refill: 1    9. Seasonal allergic rhinitis, unspecified trigger  -      levocetirizine (XYZAL) 5 MG tablet; Take 1 tablet by mouth Every Evening.  Dispense: 90 tablet; Refill: 1      Assessment & Plan  1. Hypertension.  - Currently doing well on spironolactone and lisinopril/hydrochlorothiazide.  - Blood pressure is stable with current medications.  Goal blood pressure less than 140/90.  Let me know if gets to about that.  - Discussed the effectiveness of current medications and confirmed no adverse effects.  - Refills for spironolactone and lisinopril/hydrochlorothiazide will be sent to pharmacy.  Labs drawn.  Upper diet exercise plan discussed and encouraged.  Risk of meds discussed and understood.    2. Allergies.  - Currently doing well on levocetirizine and Flonase.  - Symptoms are well-controlled with current medications.  - Reviewed the effectiveness of allergy medications and confirmed no adverse effects.  - Refills for levocetirizine and Flonase will be sent to pharmacy.    3. Arthritis.  - Reports joint aches and pains, particularly in the hips.  - Laboratory tests will be conducted to rule out any autoimmune disorders.  - X-rays of the hips will be ordered to assess for potential arthritis.  Call afterwards for results.  Patient denies any medication management for this at this time.  Informed her if blood work is unremarkable and x-rays are unremarkable and she continues to have the pain would recommend and following up with orthopedics.  She will keep me updated.    4. Health maintenance.  - Mammogram up to date, completed in 09/2024.  - Colonoscopy up to date, completed on 12/02/2022.  - Comprehensive blood work panel will be ordered to assess liver function, kidney function, cholesterol levels, thyroid function, and vitamin levels.  - Discussed the importance of regular follow-ups with specialists including endocrinologist, gynecologist, and hematologist/oncologist.  Denies pneumonia shingles COVID vaccines.                    Follow Up   Return in about 6 months (around  11/30/2025), or if symptoms worsen or fail to improve, for Annual physical.  Patient was given instructions and counseling regarding her condition or for health maintenance advice. Please see specific information pulled into the AVS if appropriate.     Patient or patient representative verbalized consent for the use of Ambient Listening during the visit with  VALENCIA Mcghee for chart documentation. 5/30/2025  10:50 EDT    VALENCIA Mcghee

## 2025-06-02 LAB
25(OH)D3+25(OH)D2 SERPL-MCNC: 102 NG/ML (ref 30–100)
ALBUMIN SERPL-MCNC: 4.5 G/DL (ref 3.8–4.9)
ALP SERPL-CCNC: 129 IU/L (ref 44–121)
ALT SERPL-CCNC: 12 IU/L (ref 0–32)
ANA SER QL: NEGATIVE
AST SERPL-CCNC: 14 IU/L (ref 0–40)
BASOPHILS # BLD AUTO: 0.1 X10E3/UL (ref 0–0.2)
BASOPHILS NFR BLD AUTO: 1 %
BILIRUB SERPL-MCNC: 0.3 MG/DL (ref 0–1.2)
BUN SERPL-MCNC: 11 MG/DL (ref 6–24)
BUN/CREAT SERPL: 13 (ref 9–23)
CALCIUM SERPL-MCNC: 10.6 MG/DL (ref 8.7–10.2)
CHLORIDE SERPL-SCNC: 100 MMOL/L (ref 96–106)
CHOLEST SERPL-MCNC: 151 MG/DL (ref 100–199)
CO2 SERPL-SCNC: 23 MMOL/L (ref 20–29)
CREAT SERPL-MCNC: 0.83 MG/DL (ref 0.57–1)
CRP SERPL-MCNC: 2 MG/L (ref 0–10)
EGFRCR SERPLBLD CKD-EPI 2021: 84 ML/MIN/1.73
EOSINOPHIL # BLD AUTO: 0.2 X10E3/UL (ref 0–0.4)
EOSINOPHIL NFR BLD AUTO: 2 %
ERYTHROCYTE [DISTWIDTH] IN BLOOD BY AUTOMATED COUNT: 13.5 % (ref 11.7–15.4)
ERYTHROCYTE [SEDIMENTATION RATE] IN BLOOD BY WESTERGREN METHOD: 25 MM/HR (ref 0–40)
GLOBULIN SER CALC-MCNC: 3.3 G/DL (ref 1.5–4.5)
GLUCOSE SERPL-MCNC: 97 MG/DL (ref 70–99)
HBA1C MFR BLD: 6.5 % (ref 4.8–5.6)
HCT VFR BLD AUTO: 36.8 % (ref 34–46.6)
HDLC SERPL-MCNC: 55 MG/DL
HGB BLD-MCNC: 12.1 G/DL (ref 11.1–15.9)
IMM GRANULOCYTES # BLD AUTO: 0.1 X10E3/UL (ref 0–0.1)
IMM GRANULOCYTES NFR BLD AUTO: 1 %
LDLC SERPL CALC-MCNC: 78 MG/DL (ref 0–99)
LYMPHOCYTES # BLD AUTO: 4.4 X10E3/UL (ref 0.7–3.1)
LYMPHOCYTES NFR BLD AUTO: 35 %
MCH RBC QN AUTO: 29.7 PG (ref 26.6–33)
MCHC RBC AUTO-ENTMCNC: 32.9 G/DL (ref 31.5–35.7)
MCV RBC AUTO: 90 FL (ref 79–97)
MONOCYTES # BLD AUTO: 0.7 X10E3/UL (ref 0.1–0.9)
MONOCYTES NFR BLD AUTO: 6 %
NEUTROPHILS # BLD AUTO: 7.1 X10E3/UL (ref 1.4–7)
NEUTROPHILS NFR BLD AUTO: 55 %
PLATELET # BLD AUTO: 404 X10E3/UL (ref 150–450)
POTASSIUM SERPL-SCNC: 4.8 MMOL/L (ref 3.5–5.2)
PROT SERPL-MCNC: 7.8 G/DL (ref 6–8.5)
RBC # BLD AUTO: 4.08 X10E6/UL (ref 3.77–5.28)
RHEUMATOID FACT SERPL-ACNC: <10 IU/ML
SODIUM SERPL-SCNC: 138 MMOL/L (ref 134–144)
TRIGL SERPL-MCNC: 97 MG/DL (ref 0–149)
TSH SERPL DL<=0.005 MIU/L-ACNC: 0.83 UIU/ML (ref 0.45–4.5)
VIT B12 SERPL-MCNC: 339 PG/ML (ref 232–1245)
VLDLC SERPL CALC-MCNC: 18 MG/DL (ref 5–40)
WBC # BLD AUTO: 12.6 X10E3/UL (ref 3.4–10.8)

## 2025-06-03 ENCOUNTER — RESULTS FOLLOW-UP (OUTPATIENT)
Dept: FAMILY MEDICINE CLINIC | Facility: CLINIC | Age: 55
End: 2025-06-03
Payer: COMMERCIAL

## 2025-06-03 DIAGNOSIS — M25.551 BILATERAL HIP PAIN: Primary | ICD-10-CM

## 2025-06-03 DIAGNOSIS — E83.52 HYPERCALCEMIA: ICD-10-CM

## 2025-06-03 DIAGNOSIS — M25.552 BILATERAL HIP PAIN: Primary | ICD-10-CM

## 2025-06-18 ENCOUNTER — TELEPHONE (OUTPATIENT)
Dept: ORTHOPEDIC SURGERY | Facility: CLINIC | Age: 55
End: 2025-06-18
Payer: COMMERCIAL

## 2025-08-22 ENCOUNTER — TELEPHONE (OUTPATIENT)
Dept: FAMILY MEDICINE CLINIC | Facility: CLINIC | Age: 55
End: 2025-08-22
Payer: COMMERCIAL

## 2025-08-22 DIAGNOSIS — E11.9 TYPE 2 DIABETES MELLITUS WITHOUT COMPLICATION, WITHOUT LONG-TERM CURRENT USE OF INSULIN: ICD-10-CM

## 2025-08-22 DIAGNOSIS — Z79.899 ENCOUNTER FOR LONG-TERM (CURRENT) USE OF MEDICATIONS: Primary | ICD-10-CM

## 2025-08-26 ENCOUNTER — LAB (OUTPATIENT)
Dept: FAMILY MEDICINE CLINIC | Facility: CLINIC | Age: 55
End: 2025-08-26
Payer: COMMERCIAL

## 2025-08-27 LAB
ALBUMIN SERPL-MCNC: 4.3 G/DL (ref 3.8–4.9)
ALP SERPL-CCNC: 128 IU/L (ref 44–121)
ALT SERPL-CCNC: 12 IU/L (ref 0–32)
AST SERPL-CCNC: 11 IU/L (ref 0–40)
BASOPHILS # BLD AUTO: 0.1 X10E3/UL (ref 0–0.2)
BASOPHILS NFR BLD AUTO: 0 %
BILIRUB SERPL-MCNC: 0.3 MG/DL (ref 0–1.2)
BUN SERPL-MCNC: 12 MG/DL (ref 6–24)
BUN/CREAT SERPL: 12 (ref 9–23)
CA-I SERPL ISE-MCNC: 5.2 MG/DL (ref 4.5–5.6)
CALCIUM SERPL-MCNC: 10 MG/DL (ref 8.7–10.2)
CHLORIDE SERPL-SCNC: 103 MMOL/L (ref 96–106)
CO2 SERPL-SCNC: 25 MMOL/L (ref 20–29)
CREAT SERPL-MCNC: 1.03 MG/DL (ref 0.57–1)
EGFRCR SERPLBLD CKD-EPI 2021: 65 ML/MIN/1.73
EOSINOPHIL # BLD AUTO: 0.2 X10E3/UL (ref 0–0.4)
EOSINOPHIL NFR BLD AUTO: 2 %
ERYTHROCYTE [DISTWIDTH] IN BLOOD BY AUTOMATED COUNT: 13.2 % (ref 11.7–15.4)
GLOBULIN SER CALC-MCNC: 3 G/DL (ref 1.5–4.5)
GLUCOSE SERPL-MCNC: 123 MG/DL (ref 70–99)
HBA1C MFR BLD: 6.8 % (ref 4.8–5.6)
HCT VFR BLD AUTO: 35.2 % (ref 34–46.6)
HGB BLD-MCNC: 11.2 G/DL (ref 11.1–15.9)
IMM GRANULOCYTES # BLD AUTO: 0 X10E3/UL (ref 0–0.1)
IMM GRANULOCYTES NFR BLD AUTO: 0 %
LYMPHOCYTES # BLD AUTO: 3.9 X10E3/UL (ref 0.7–3.1)
LYMPHOCYTES NFR BLD AUTO: 34 %
MCH RBC QN AUTO: 29.3 PG (ref 26.6–33)
MCHC RBC AUTO-ENTMCNC: 31.8 G/DL (ref 31.5–35.7)
MCV RBC AUTO: 92 FL (ref 79–97)
MONOCYTES # BLD AUTO: 0.7 X10E3/UL (ref 0.1–0.9)
MONOCYTES NFR BLD AUTO: 6 %
NEUTROPHILS # BLD AUTO: 6.7 X10E3/UL (ref 1.4–7)
NEUTROPHILS NFR BLD AUTO: 58 %
PLATELET # BLD AUTO: 344 X10E3/UL (ref 150–450)
POTASSIUM SERPL-SCNC: 4.8 MMOL/L (ref 3.5–5.2)
PROT SERPL-MCNC: 7.3 G/DL (ref 6–8.5)
PTH-INTACT SERPL-MCNC: 30 PG/ML (ref 15–65)
RBC # BLD AUTO: 3.82 X10E6/UL (ref 3.77–5.28)
SODIUM SERPL-SCNC: 142 MMOL/L (ref 134–144)
WBC # BLD AUTO: 11.7 X10E3/UL (ref 3.4–10.8)

## (undated) DEVICE — ADHS SKIN PREMIERPRO EXOFIN TOPICAL HI/VISC .5ML

## (undated) DEVICE — SCRB SURG BACTOSHIELD CHG 4PCT 4OZ

## (undated) DEVICE — ANTIBACTERIAL UNDYED BRAIDED (POLYGLACTIN 910), SYNTHETIC ABSORBABLE SUTURE: Brand: COATED VICRYL

## (undated) DEVICE — GLV SURG DERMASSURE GRN LF PF 7.0

## (undated) DEVICE — [HIGH FLOW INSUFFLATOR,  DO NOT USE IF PACKAGE IS DAMAGED,  KEEP DRY,  KEEP AWAY FROM SUNLIGHT,  PROTECT FROM HEAT AND RADIOACTIVE SOURCES.]: Brand: PNEUMOSURE

## (undated) DEVICE — BLANKT WARM UPPR/BDY ARM/OUT 57X196CM

## (undated) DEVICE — TRENDELENBURG WINGPAD POSITIONING KIT DELUXE - WITHOUT BODY STRAP: Brand: SOULE MEDICAL

## (undated) DEVICE — GLV SURG SENSICARE PI MIC PF SZ6.5 LF STRL

## (undated) DEVICE — PATIENT RETURN ELECTRODE, SINGLE USE, CONTACT QUALITY MONITORING, ADULT WITH 9 FT (2.7 M) CORD. FOR PATIENTS WEIGHING OVER 33LBS. (15KG): Brand: MEGADYNE

## (undated) DEVICE — MARYLAND JAW LAPAROSCOPIC SEALER/DIVIDER COATED: Brand: LIGASURE

## (undated) DEVICE — LAPAROSCOPIC SMOKE FILTRATION SYSTEM: Brand: PALL LAPAROSHIELD® PLUS LAPAROSCOPIC SMOKE FILTRATION SYSTEM

## (undated) DEVICE — ENDOCUT SCISSOR TIP, DISPOSABLE: Brand: RENEW

## (undated) DEVICE — ENDOPATH XCEL BLADELESS TROCARS WITH STABILITY SLEEVES: Brand: ENDOPATH XCEL

## (undated) DEVICE — SYR LUERLOK 50ML

## (undated) DEVICE — ENDOPATH XCEL UNIVERSAL TROCAR STABLILITY SLEEVES: Brand: ENDOPATH XCEL

## (undated) DEVICE — APPL CHLORAPREP TINTED 26ML TEAL

## (undated) DEVICE — MANIP UTER ELEVATOR/PRO W/OCCLUDORBALLOON/BALN 37MM LG STRL

## (undated) DEVICE — MANIP UTER ELEVATOR/PRO W/OCCLUDORBALLOON/BALN 35MM MD STRL

## (undated) DEVICE — TUBING, SUCTION, 1/4" X 10', STRAIGHT: Brand: MEDLINE

## (undated) DEVICE — PK LAP HYST 10